# Patient Record
Sex: FEMALE | Race: WHITE | Employment: OTHER | URBAN - METROPOLITAN AREA
[De-identification: names, ages, dates, MRNs, and addresses within clinical notes are randomized per-mention and may not be internally consistent; named-entity substitution may affect disease eponyms.]

---

## 2017-01-30 ENCOUNTER — TELEPHONE (OUTPATIENT)
Dept: FAMILY MEDICINE CLINIC | Age: 82
End: 2017-01-30

## 2017-02-02 ENCOUNTER — TELEPHONE (OUTPATIENT)
Dept: FAMILY MEDICINE CLINIC | Age: 82
End: 2017-02-02

## 2017-02-05 DIAGNOSIS — N32.81 OVERACTIVE BLADDER: ICD-10-CM

## 2017-02-06 RX ORDER — SOLIFENACIN SUCCINATE 5 MG/1
TABLET, FILM COATED ORAL
Qty: 90 TABLET | Refills: 0 | Status: SHIPPED | OUTPATIENT
Start: 2017-02-06 | End: 2017-05-16 | Stop reason: SDUPTHER

## 2017-05-16 ENCOUNTER — OFFICE VISIT (OUTPATIENT)
Dept: FAMILY MEDICINE CLINIC | Age: 82
End: 2017-05-16

## 2017-05-16 VITALS — DIASTOLIC BLOOD PRESSURE: 80 MMHG | SYSTOLIC BLOOD PRESSURE: 124 MMHG | HEIGHT: 60 IN

## 2017-05-16 DIAGNOSIS — R63.4 WEIGHT LOSS: ICD-10-CM

## 2017-05-16 DIAGNOSIS — N32.81 OVERACTIVE BLADDER: ICD-10-CM

## 2017-05-16 DIAGNOSIS — I69.959 HEMIPLEGIA OF DOMINANT SIDE, LATE EFFECT OF CEREBROVASCULAR DISEASE (HCC): Primary | ICD-10-CM

## 2017-05-16 DIAGNOSIS — I63.20 CEREBROVASCULAR ACCIDENT (CVA) DUE TO OCCLUSION OF PRECEREBRAL ARTERY (HCC): ICD-10-CM

## 2017-05-16 DIAGNOSIS — K21.9 GASTROESOPHAGEAL REFLUX DISEASE WITHOUT ESOPHAGITIS: ICD-10-CM

## 2017-05-16 DIAGNOSIS — E78.2 MIXED HYPERLIPIDEMIA: ICD-10-CM

## 2017-05-16 DIAGNOSIS — I10 ESSENTIAL HYPERTENSION: ICD-10-CM

## 2017-05-16 DIAGNOSIS — I73.9 PERIPHERAL VASCULAR DISEASE (HCC): ICD-10-CM

## 2017-05-16 LAB
A/G RATIO: 1.2 (ref 1.1–2.2)
ALBUMIN SERPL-MCNC: 4.1 G/DL (ref 3.4–5)
ALP BLD-CCNC: 108 U/L (ref 40–129)
ALT SERPL-CCNC: 21 U/L (ref 10–40)
ANION GAP SERPL CALCULATED.3IONS-SCNC: 11 MMOL/L (ref 3–16)
AST SERPL-CCNC: 18 U/L (ref 15–37)
BILIRUB SERPL-MCNC: 0.6 MG/DL (ref 0–1)
BUN BLDV-MCNC: 16 MG/DL (ref 7–20)
CALCIUM SERPL-MCNC: 10.1 MG/DL (ref 8.3–10.6)
CHLORIDE BLD-SCNC: 93 MMOL/L (ref 99–110)
CO2: 29 MMOL/L (ref 21–32)
CREAT SERPL-MCNC: 0.7 MG/DL (ref 0.6–1.2)
GFR AFRICAN AMERICAN: >60
GFR NON-AFRICAN AMERICAN: >60
GLOBULIN: 3.3 G/DL
GLUCOSE BLD-MCNC: 95 MG/DL (ref 70–99)
HCT VFR BLD CALC: 40.9 % (ref 36–48)
HEMOGLOBIN: 13.6 G/DL (ref 12–16)
MCH RBC QN AUTO: 31.2 PG (ref 26–34)
MCHC RBC AUTO-ENTMCNC: 33.2 G/DL (ref 31–36)
MCV RBC AUTO: 94.1 FL (ref 80–100)
PDW BLD-RTO: 14.2 % (ref 12.4–15.4)
PLATELET # BLD: 340 K/UL (ref 135–450)
PMV BLD AUTO: 9.3 FL (ref 5–10.5)
POTASSIUM SERPL-SCNC: 4.6 MMOL/L (ref 3.5–5.1)
RBC # BLD: 4.34 M/UL (ref 4–5.2)
SODIUM BLD-SCNC: 133 MMOL/L (ref 136–145)
TOTAL PROTEIN: 7.4 G/DL (ref 6.4–8.2)
WBC # BLD: 9.4 K/UL (ref 4–11)

## 2017-05-16 PROCEDURE — 36415 COLL VENOUS BLD VENIPUNCTURE: CPT | Performed by: FAMILY MEDICINE

## 2017-05-16 PROCEDURE — 99214 OFFICE O/P EST MOD 30 MIN: CPT | Performed by: FAMILY MEDICINE

## 2017-05-16 RX ORDER — OMEPRAZOLE 20 MG/1
CAPSULE, DELAYED RELEASE ORAL
Qty: 90 CAPSULE | Refills: 1 | Status: SHIPPED | OUTPATIENT
Start: 2017-05-16 | End: 2018-02-12 | Stop reason: SDUPTHER

## 2017-05-16 RX ORDER — VALSARTAN 160 MG/1
TABLET ORAL
Qty: 90 TABLET | Refills: 1 | Status: ON HOLD | OUTPATIENT
Start: 2017-05-16 | End: 2017-10-05 | Stop reason: HOSPADM

## 2017-05-16 RX ORDER — TIZANIDINE 2 MG/1
TABLET ORAL
Qty: 90 TABLET | Refills: 1 | Status: SHIPPED | OUTPATIENT
Start: 2017-05-16 | End: 2017-09-18 | Stop reason: SDUPTHER

## 2017-05-16 RX ORDER — SOLIFENACIN SUCCINATE 5 MG/1
TABLET, FILM COATED ORAL
Qty: 90 TABLET | Refills: 1 | Status: SHIPPED | OUTPATIENT
Start: 2017-05-16 | End: 2017-05-30 | Stop reason: SDUPTHER

## 2017-05-16 RX ORDER — SIMVASTATIN 40 MG
40 TABLET ORAL EVERY EVENING
Qty: 90 TABLET | Refills: 1 | Status: SHIPPED | OUTPATIENT
Start: 2017-05-16 | End: 2018-02-12 | Stop reason: SDUPTHER

## 2017-05-16 RX ORDER — BISOPROLOL FUMARATE AND HYDROCHLOROTHIAZIDE 5; 6.25 MG/1; MG/1
TABLET ORAL
Qty: 90 TABLET | Refills: 1 | Status: ON HOLD | OUTPATIENT
Start: 2017-05-16 | End: 2017-10-05 | Stop reason: HOSPADM

## 2017-05-16 RX ORDER — HYDROCHLOROTHIAZIDE 25 MG/1
TABLET ORAL
Qty: 90 TABLET | Refills: 1 | Status: ON HOLD | OUTPATIENT
Start: 2017-05-16 | End: 2017-10-05 | Stop reason: HOSPADM

## 2017-05-16 RX ORDER — DICLOFENAC SODIUM 75 MG/1
75 TABLET, DELAYED RELEASE ORAL 2 TIMES DAILY
Qty: 60 TABLET | Refills: 3 | Status: ON HOLD | OUTPATIENT
Start: 2017-05-16 | End: 2017-10-05 | Stop reason: HOSPADM

## 2017-05-16 ASSESSMENT — ENCOUNTER SYMPTOMS
DIARRHEA: 0
WHEEZING: 0
BACK PAIN: 0
SORE THROAT: 0
SHORTNESS OF BREATH: 0
EYE PAIN: 0
COUGH: 0
VOMITING: 0
ABDOMINAL PAIN: 0
BLOOD IN STOOL: 0
TROUBLE SWALLOWING: 0

## 2017-05-26 ENCOUNTER — TELEPHONE (OUTPATIENT)
Dept: FAMILY MEDICINE CLINIC | Age: 82
End: 2017-05-26

## 2017-05-30 DIAGNOSIS — N32.81 OVERACTIVE BLADDER: ICD-10-CM

## 2017-05-30 RX ORDER — SOLIFENACIN SUCCINATE 10 MG/1
TABLET, FILM COATED ORAL
Qty: 30 TABLET | Refills: 1 | Status: SHIPPED | OUTPATIENT
Start: 2017-05-30 | End: 2017-11-21

## 2017-08-27 DIAGNOSIS — N32.81 OVERACTIVE BLADDER: ICD-10-CM

## 2017-08-28 RX ORDER — OXYBUTYNIN CHLORIDE 5 MG/1
TABLET ORAL
Qty: 90 TABLET | Refills: 2 | Status: SHIPPED | OUTPATIENT
Start: 2017-08-28 | End: 2017-11-21 | Stop reason: ALTCHOICE

## 2017-09-18 DIAGNOSIS — I63.20 CEREBROVASCULAR ACCIDENT (CVA) DUE TO OCCLUSION OF PRECEREBRAL ARTERY (HCC): ICD-10-CM

## 2017-09-18 RX ORDER — TIZANIDINE 2 MG/1
TABLET ORAL
Qty: 90 TABLET | Refills: 0 | Status: SHIPPED | OUTPATIENT
Start: 2017-09-18 | End: 2017-12-24 | Stop reason: SDUPTHER

## 2017-09-19 ENCOUNTER — OFFICE VISIT (OUTPATIENT)
Dept: FAMILY MEDICINE CLINIC | Age: 82
End: 2017-09-19

## 2017-09-19 VITALS — SYSTOLIC BLOOD PRESSURE: 126 MMHG | HEIGHT: 60 IN | DIASTOLIC BLOOD PRESSURE: 80 MMHG

## 2017-09-19 DIAGNOSIS — I73.9 PERIPHERAL VASCULAR DISEASE (HCC): ICD-10-CM

## 2017-09-19 DIAGNOSIS — Z23 NEEDS FLU SHOT: ICD-10-CM

## 2017-09-19 DIAGNOSIS — E78.2 MIXED HYPERLIPIDEMIA: ICD-10-CM

## 2017-09-19 DIAGNOSIS — I69.959 HEMIPLEGIA, LATE EFFECT OF CEREBROVASCULAR DISEASE (HCC): Primary | ICD-10-CM

## 2017-09-19 DIAGNOSIS — I10 ESSENTIAL HYPERTENSION: ICD-10-CM

## 2017-09-19 LAB
ANION GAP SERPL CALCULATED.3IONS-SCNC: 18 MMOL/L (ref 3–16)
BUN BLDV-MCNC: 34 MG/DL (ref 7–20)
CALCIUM SERPL-MCNC: 9.6 MG/DL (ref 8.3–10.6)
CHLORIDE BLD-SCNC: 91 MMOL/L (ref 99–110)
CO2: 25 MMOL/L (ref 21–32)
CREAT SERPL-MCNC: 1.4 MG/DL (ref 0.6–1.2)
GFR AFRICAN AMERICAN: 43
GFR NON-AFRICAN AMERICAN: 36
GLUCOSE BLD-MCNC: 91 MG/DL (ref 70–99)
HCT VFR BLD CALC: 38.4 % (ref 36–48)
HEMOGLOBIN: 12.8 G/DL (ref 12–16)
MCH RBC QN AUTO: 32 PG (ref 26–34)
MCHC RBC AUTO-ENTMCNC: 33.4 G/DL (ref 31–36)
MCV RBC AUTO: 95.8 FL (ref 80–100)
PDW BLD-RTO: 14.7 % (ref 12.4–15.4)
PLATELET # BLD: 351 K/UL (ref 135–450)
PMV BLD AUTO: 10 FL (ref 5–10.5)
POTASSIUM SERPL-SCNC: 4.2 MMOL/L (ref 3.5–5.1)
RBC # BLD: 4.01 M/UL (ref 4–5.2)
SODIUM BLD-SCNC: 134 MMOL/L (ref 136–145)
WBC # BLD: 11 K/UL (ref 4–11)

## 2017-09-19 PROCEDURE — 90662 IIV NO PRSV INCREASED AG IM: CPT | Performed by: FAMILY MEDICINE

## 2017-09-19 PROCEDURE — G0008 ADMIN INFLUENZA VIRUS VAC: HCPCS | Performed by: FAMILY MEDICINE

## 2017-09-19 PROCEDURE — 99214 OFFICE O/P EST MOD 30 MIN: CPT | Performed by: FAMILY MEDICINE

## 2017-09-19 PROCEDURE — 36415 COLL VENOUS BLD VENIPUNCTURE: CPT | Performed by: FAMILY MEDICINE

## 2017-09-20 ASSESSMENT — ENCOUNTER SYMPTOMS
SHORTNESS OF BREATH: 0
SORE THROAT: 0
ABDOMINAL PAIN: 0
COUGH: 0

## 2017-10-01 PROBLEM — G93.40 ACUTE ENCEPHALOPATHY: Status: ACTIVE | Noted: 2017-10-01

## 2017-10-01 PROBLEM — E87.1 HYPONATREMIA: Status: ACTIVE | Noted: 2017-10-01

## 2017-10-01 PROBLEM — N30.00 ACUTE CYSTITIS: Status: ACTIVE | Noted: 2017-10-01

## 2017-10-19 ENCOUNTER — CARE COORDINATION (OUTPATIENT)
Dept: CASE MANAGEMENT | Age: 82
End: 2017-10-19

## 2017-10-23 ENCOUNTER — CARE COORDINATION (OUTPATIENT)
Dept: CASE MANAGEMENT | Age: 82
End: 2017-10-23

## 2017-10-23 NOTE — CARE COORDINATION
Received an email 10/19/17 regarding a patient update from Ashley Bailey dated for 10/18/17. Ceftin antibiotic completed on 10/16/17. Mirabegron ER 25mg 1 po qd started on 10/17/17, Vesicare 10mg qd d/c'd on 10/16/17. Falls on 10/12&10/13 r/t unassisted attempts to transfer. Bed mobility min assist, transfers mod assist, ambulation SBA 20ft. ADL's grooming min assist, upper/lower body dressing mod assist, toileting min assist, bathing max assist. 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 Insurance cut, estimated date of discharge 10/20/17, family plans to appeal. If unsuccessful home with [facility]. Family support but does not physically assist. Post acute care transition coordinator will continue to follow.  ZHENG BeebeN RN  Care Transition Coordinator  [phone]

## 2017-10-23 NOTE — ADDENDUM NOTE
Encounter addended by: Vincente Phoenix, RN on: 10/23/2017  9:57 AM<BR>    Actions taken: Contacts section saved, Sign clinical note

## 2017-10-30 ENCOUNTER — CARE COORDINATION (OUTPATIENT)
Dept: CASE MANAGEMENT | Age: 82
End: 2017-10-30

## 2017-10-30 ENCOUNTER — TELEPHONE (OUTPATIENT)
Dept: FAMILY MEDICINE CLINIC | Age: 82
End: 2017-10-30

## 2017-10-30 NOTE — CARE COORDINATION
Received a Careport alert patient discharged on 10/29/17 with [facility]. CC's notified. Post acute care signing off.  ZHENG WardN RN  Care Transition Coordinator  [phone]

## 2017-10-30 NOTE — TELEPHONE ENCOUNTER
Juan M Mckay is calling to see if  will sign the home healthcare orders so they can treat the patient at home.

## 2017-10-30 NOTE — CARE COORDINATION
[facility] Transitions Initial Follow Up Call    Call within 2 business days of discharge: Yes    Patient: Rahul Monroy Patient : 11/3/1933   MRN: [id number]  Reason for Admission: There are no discharge diagnoses documented for the most recent discharge. Discharge Date: 10/5/17 RARS: [facility] Risk Score: 12.75     Spoke with: Augustine Mtz (patient)    Facility: [address] [place] [address] Drive - to home      Inpatient Assessment  Care Transitions 24 Hour Call    Care Transitions Interventions         Follow Up: Spoke with Savanna Engle. She states she is \"tired\". [facility] transition to home was [address] Savanna Engle has a weekly aide through  [facility] - she is unsure if this has been restarted. Call placed to Rohini Waddell to confirm - spoke with Karley at [facility] - she states they are not aware that the patient returned home. They can not restart services until requested by patient or her family - informed [facility]  of this info. Savanna Engle uses a w/c at home. Savanna Engle was unsure about [facility] per Ann Marie. Spoke with Aristeo Grace at [facility] # [phone]. They spoke with [facility]  and have her scheduled for a start of care this afternoon. Writer offered assistance in scheduling f/u appt with PCP. Savanna Engle declined. Savanna Engle adamantly declines any further calls from [facility].   Future Appointments  Date Time Provider Alejo Noe   2017 10:30 AM SCHEDULE, VASCULAR LAB 1 [facility]   2017 11:15 AM JIMBO Butcher MD [facility]   2018 11:00 AM DO Denisha Anna  LAURE Vincent RN

## 2017-11-07 ENCOUNTER — PROCEDURE VISIT (OUTPATIENT)
Dept: SURGERY | Age: 82
End: 2017-11-07

## 2017-11-07 ENCOUNTER — OFFICE VISIT (OUTPATIENT)
Dept: SURGERY | Age: 82
End: 2017-11-07

## 2017-11-07 VITALS — DIASTOLIC BLOOD PRESSURE: 80 MMHG | SYSTOLIC BLOOD PRESSURE: 140 MMHG

## 2017-11-07 DIAGNOSIS — I65.23 BILATERAL CAROTID ARTERY STENOSIS: ICD-10-CM

## 2017-11-07 DIAGNOSIS — I63.50 CEREBROVASCULAR ACCIDENT (CVA) DUE TO OCCLUSION OF CEREBRAL ARTERY (HCC): Primary | ICD-10-CM

## 2017-11-07 DIAGNOSIS — E78.2 MIXED HYPERLIPIDEMIA: ICD-10-CM

## 2017-11-07 DIAGNOSIS — I10 ESSENTIAL HYPERTENSION: ICD-10-CM

## 2017-11-07 DIAGNOSIS — I69.959 HEMIPLEGIA, LATE EFFECT OF CEREBROVASCULAR DISEASE (HCC): ICD-10-CM

## 2017-11-07 PROCEDURE — 1123F ACP DISCUSS/DSCN MKR DOCD: CPT | Performed by: SURGERY

## 2017-11-07 PROCEDURE — 93880 EXTRACRANIAL BILAT STUDY: CPT | Performed by: SURGERY

## 2017-11-07 PROCEDURE — 99213 OFFICE O/P EST LOW 20 MIN: CPT | Performed by: SURGERY

## 2017-11-07 PROCEDURE — 1036F TOBACCO NON-USER: CPT | Performed by: SURGERY

## 2017-11-07 PROCEDURE — G8484 FLU IMMUNIZE NO ADMIN: HCPCS | Performed by: SURGERY

## 2017-11-07 PROCEDURE — G8420 CALC BMI NORM PARAMETERS: HCPCS | Performed by: SURGERY

## 2017-11-07 PROCEDURE — G8400 PT W/DXA NO RESULTS DOC: HCPCS | Performed by: SURGERY

## 2017-11-07 PROCEDURE — 1090F PRES/ABSN URINE INCON ASSESS: CPT | Performed by: SURGERY

## 2017-11-07 PROCEDURE — G8427 DOCREV CUR MEDS BY ELIG CLIN: HCPCS | Performed by: SURGERY

## 2017-11-07 PROCEDURE — 4040F PNEUMOC VAC/ADMIN/RCVD: CPT | Performed by: SURGERY

## 2017-11-07 PROCEDURE — G8598 ASA/ANTIPLAT THER USED: HCPCS | Performed by: SURGERY

## 2017-11-07 RX ORDER — MIRABEGRON 25 MG/1
TABLET, FILM COATED, EXTENDED RELEASE ORAL
COMMUNITY
Start: 2017-10-27 | End: 2017-11-21 | Stop reason: SDUPTHER

## 2017-11-07 NOTE — PROGRESS NOTES
tone (4/5 reduced muscle strength left lower extremity; 3/5 contracture - left upper extremity). Gait (staggering (no assist devices used); abnormal.   Skin: Skin is warm, dry and intact. No rash noted. No erythema. Psychiatric: She has a normal mood and affect. Her speech is normal   Assessment:      1. Cerebrovascular accident (CVA) due to occlusion of cerebral artery ([address]     2. Hemiplegia, late effect of cerebrovascular disease ([address]     3. Essential hypertension     4. Mixed hyperlipidemia             Plan:      Carotid artery scan shows persistent normal right carotid artery and the left carotid shows ~50% narrowing which is not dangerous.   Same medications as you are taking  Repeat carotid artery scan in 2 years again

## 2017-11-07 NOTE — PATIENT INSTRUCTIONS
Carotid artery scan shows persistent normal right carotid artery and the left carotid shows ~50% narrowing which is not dangerous.   Same medications as you are taking  Repeat carotid artery scan in 2 years again

## 2017-11-20 DIAGNOSIS — I69.959 HEMIPLEGIA OF DOMINANT SIDE, LATE EFFECT OF CEREBROVASCULAR DISEASE (HCC): ICD-10-CM

## 2017-11-21 ENCOUNTER — OFFICE VISIT (OUTPATIENT)
Dept: FAMILY MEDICINE CLINIC | Age: 82
End: 2017-11-21

## 2017-11-21 VITALS — HEIGHT: 61 IN | SYSTOLIC BLOOD PRESSURE: 120 MMHG | DIASTOLIC BLOOD PRESSURE: 70 MMHG

## 2017-11-21 DIAGNOSIS — I63.50 CEREBROVASCULAR ACCIDENT (CVA) DUE TO OCCLUSION OF CEREBRAL ARTERY (HCC): ICD-10-CM

## 2017-11-21 DIAGNOSIS — I10 ESSENTIAL HYPERTENSION: ICD-10-CM

## 2017-11-21 DIAGNOSIS — Z09 HOSPITAL DISCHARGE FOLLOW-UP: Primary | ICD-10-CM

## 2017-11-21 DIAGNOSIS — N32.81 OVERACTIVE BLADDER: ICD-10-CM

## 2017-11-21 DIAGNOSIS — N30.00 ACUTE CYSTITIS WITHOUT HEMATURIA: ICD-10-CM

## 2017-11-21 PROCEDURE — G8420 CALC BMI NORM PARAMETERS: HCPCS | Performed by: FAMILY MEDICINE

## 2017-11-21 PROCEDURE — 4040F PNEUMOC VAC/ADMIN/RCVD: CPT | Performed by: FAMILY MEDICINE

## 2017-11-21 PROCEDURE — 99214 OFFICE O/P EST MOD 30 MIN: CPT | Performed by: FAMILY MEDICINE

## 2017-11-21 PROCEDURE — G8484 FLU IMMUNIZE NO ADMIN: HCPCS | Performed by: FAMILY MEDICINE

## 2017-11-21 PROCEDURE — G8427 DOCREV CUR MEDS BY ELIG CLIN: HCPCS | Performed by: FAMILY MEDICINE

## 2017-11-21 PROCEDURE — 1090F PRES/ABSN URINE INCON ASSESS: CPT | Performed by: FAMILY MEDICINE

## 2017-11-21 PROCEDURE — 1036F TOBACCO NON-USER: CPT | Performed by: FAMILY MEDICINE

## 2017-11-21 PROCEDURE — G8598 ASA/ANTIPLAT THER USED: HCPCS | Performed by: FAMILY MEDICINE

## 2017-11-21 PROCEDURE — 1123F ACP DISCUSS/DSCN MKR DOCD: CPT | Performed by: FAMILY MEDICINE

## 2017-11-21 PROCEDURE — G8400 PT W/DXA NO RESULTS DOC: HCPCS | Performed by: FAMILY MEDICINE

## 2017-11-21 RX ORDER — MIRABEGRON 25 MG/1
25 TABLET, FILM COATED, EXTENDED RELEASE ORAL DAILY
Qty: 30 TABLET | Refills: 3 | Status: SHIPPED | OUTPATIENT
Start: 2017-11-21 | End: 2018-02-12 | Stop reason: SDUPTHER

## 2017-11-21 RX ORDER — DICLOFENAC SODIUM 75 MG/1
TABLET, DELAYED RELEASE ORAL
Qty: 60 TABLET | Refills: 2 | Status: SHIPPED | OUTPATIENT
Start: 2017-11-21 | End: 2017-11-21

## 2017-11-21 NOTE — PROGRESS NOTES
Subjective:      Patient ID: Elyse Phoenix is a 80 y.o. female. [address] discharge follow up  Had prolonged syncopal episide   admitted to hospital 3-4 days   felt syncope due to sepsis e coli utu with metabolic encephalopathy hypotension  Neurology: no new cva   complete resolution  Rehab few weeks at [address]    back to base line    Current Outpatient Prescriptions   Medication Sig      MYRBETRIQ 25 MG TB24       tiZANidine (ZANAFLEX) 2 MG tablet TAKE ONE TABLET BY MOUTH EVERY 8 HOURS AS NEEDED      oxybutynin (DITROPAN) 5 MG tablet TAKE ONE TABLET BY MOUTH THREE TIMES A DAY      simvastatin (ZOCOR) 40 MG tablet Take 1 tablet by mouth every evening      omeprazole (PRILOSEC) 20 MG delayed release capsule Take 1 capsule by mouth  daily      aspirin 81 MG EC tablet Take 162 mg by mouth daily.  diclofenac (VOLTAREN) 75 MG EC tablet TAKE ONE TABLET BY MOUTH TWICE A DAY      solifenacin (VESICARE) 10 MG tablet TAKE ONE TABLET BY MOUTH DAILY       No current facility-administered medications for this visit. Review of Systems   Constitutional: Positive for activity change and appetite change. Negative for chills and fever. HENT: Negative for congestion and sore throat. Eyes: Negative for visual disturbance. Respiratory: Negative for shortness of breath. Cardiovascular: Negative for chest pain. Genitourinary: Positive for difficulty urinating. Musculoskeletal: Positive for gait problem. Neurological: Positive for facial asymmetry, speech difficulty, weakness and numbness. A complete 14 point  review of systems was completed; pertinent positives are noted in HPI    Vitals:    11/21/17 1142   BP: 120/70   Height: 5' 1\" (1.549 m)     There is no height or weight on file to calculate BMI.      Wt Readings from Last 3 Encounters:   10/03/17 127 lb 6.8 oz (57.8 kg)   12/07/16 150 lb (68 kg)   11/13/16 164 lb 3.9 oz (74.5 kg)     BP Readings from Last 3 Encounters:   11/21/17 120/70   11/07/17 (!) 140/80   10/05/17 (!) 145/87        /70   Ht 5' 1\" (1.549 m)    Objective:   Physical Exam   HENT:   Mouth/Throat: Oropharynx is clear and moist.   Eyes: Conjunctivae are normal.   Neck: Neck supple. Abdominal: Soft. There is no tenderness. Neurological: She is alert. left hemiparalysis  Alert  Irritable         Assessment:      Assessment/Plan:  Leah Cheng was seen today for follow-up from hospital and discuss medications.     Diagnoses and all orders for this visit:    Hospital discharge follow-up    Acute cystitis without hematuria  -     POCT Urinalysis no Micro    Cerebrovascular accident (CVA) due to occlusion of cerebral artery (HCC)    Overactive bladder  -     MYRBETRIQ 25 MG TB24; Take 1 tablet by mouth Daily    Essential hypertension            Plan:      Extensive review hosp and ecf records lab and imaging reports   need to recheck UA      Family wanting to collect sample at home  Cont meds from hosp   med list updated  rto 4 week

## 2017-11-22 ASSESSMENT — PATIENT HEALTH QUESTIONNAIRE - PHQ9
2. FEELING DOWN, DEPRESSED OR HOPELESS: 1
SUM OF ALL RESPONSES TO PHQ QUESTIONS 1-9: 2
1. LITTLE INTEREST OR PLEASURE IN DOING THINGS: 1
SUM OF ALL RESPONSES TO PHQ9 QUESTIONS 1 & 2: 2

## 2017-11-22 ASSESSMENT — ENCOUNTER SYMPTOMS
SORE THROAT: 0
SHORTNESS OF BREATH: 0

## 2017-12-24 DIAGNOSIS — I63.20 CEREBROVASCULAR ACCIDENT (CVA) DUE TO OCCLUSION OF PRECEREBRAL ARTERY (HCC): ICD-10-CM

## 2017-12-26 DIAGNOSIS — I10 ESSENTIAL HYPERTENSION: ICD-10-CM

## 2017-12-26 RX ORDER — VALSARTAN 160 MG/1
TABLET ORAL
Qty: 90 TABLET | Refills: 0 | Status: SHIPPED | OUTPATIENT
Start: 2017-12-26 | End: 2018-07-24 | Stop reason: SDUPTHER

## 2017-12-26 RX ORDER — TIZANIDINE 2 MG/1
TABLET ORAL
Qty: 90 TABLET | Refills: 0 | Status: SHIPPED | OUTPATIENT
Start: 2017-12-26 | End: 2018-02-12 | Stop reason: SDUPTHER

## 2018-02-12 DIAGNOSIS — K21.9 GASTROESOPHAGEAL REFLUX DISEASE WITHOUT ESOPHAGITIS: ICD-10-CM

## 2018-02-12 DIAGNOSIS — I73.9 PERIPHERAL VASCULAR DISEASE (HCC): ICD-10-CM

## 2018-02-12 DIAGNOSIS — I63.20 CEREBROVASCULAR ACCIDENT (CVA) DUE TO OCCLUSION OF PRECEREBRAL ARTERY (HCC): ICD-10-CM

## 2018-02-12 DIAGNOSIS — N32.81 OVERACTIVE BLADDER: ICD-10-CM

## 2018-02-12 DIAGNOSIS — I69.959 HEMIPLEGIA OF DOMINANT SIDE, LATE EFFECT OF CEREBROVASCULAR DISEASE (HCC): ICD-10-CM

## 2018-02-12 DIAGNOSIS — E78.2 MIXED HYPERLIPIDEMIA: ICD-10-CM

## 2018-02-12 RX ORDER — OMEPRAZOLE 20 MG/1
CAPSULE, DELAYED RELEASE ORAL
Qty: 90 CAPSULE | Refills: 0 | Status: SHIPPED | OUTPATIENT
Start: 2018-02-12 | End: 2018-07-24

## 2018-02-12 RX ORDER — TIZANIDINE 2 MG/1
TABLET ORAL
Qty: 90 TABLET | Refills: 0 | Status: SHIPPED | OUTPATIENT
Start: 2018-02-12 | End: 2018-04-08 | Stop reason: SDUPTHER

## 2018-02-12 RX ORDER — MIRABEGRON 25 MG/1
25 TABLET, FILM COATED, EXTENDED RELEASE ORAL DAILY
Qty: 30 TABLET | Refills: 2 | Status: SHIPPED | OUTPATIENT
Start: 2018-02-12 | End: 2018-04-25

## 2018-02-12 RX ORDER — DICLOFENAC SODIUM 75 MG/1
75 TABLET, DELAYED RELEASE ORAL 2 TIMES DAILY
Qty: 60 TABLET | Refills: 2 | Status: SHIPPED | OUTPATIENT
Start: 2018-02-12 | End: 2018-07-16

## 2018-02-12 RX ORDER — SIMVASTATIN 40 MG
40 TABLET ORAL EVERY EVENING
Qty: 90 TABLET | Refills: 0 | Status: SHIPPED | OUTPATIENT
Start: 2018-02-12 | End: 2018-04-02 | Stop reason: SDUPTHER

## 2018-04-02 DIAGNOSIS — E78.2 MIXED HYPERLIPIDEMIA: ICD-10-CM

## 2018-04-02 DIAGNOSIS — I73.9 PERIPHERAL VASCULAR DISEASE (HCC): ICD-10-CM

## 2018-04-02 RX ORDER — SIMVASTATIN 40 MG
40 TABLET ORAL EVERY EVENING
Qty: 90 TABLET | Refills: 1 | Status: SHIPPED | OUTPATIENT
Start: 2018-04-02 | End: 2018-07-24 | Stop reason: SDUPTHER

## 2018-04-08 DIAGNOSIS — I63.20 CEREBROVASCULAR ACCIDENT (CVA) DUE TO OCCLUSION OF PRECEREBRAL ARTERY (HCC): ICD-10-CM

## 2018-04-09 RX ORDER — TIZANIDINE 2 MG/1
TABLET ORAL
Qty: 90 TABLET | Refills: 1 | Status: SHIPPED | OUTPATIENT
Start: 2018-04-09 | End: 2018-04-25

## 2018-04-25 ENCOUNTER — OFFICE VISIT (OUTPATIENT)
Dept: FAMILY MEDICINE CLINIC | Age: 83
End: 2018-04-25

## 2018-04-25 VITALS — HEIGHT: 61 IN | DIASTOLIC BLOOD PRESSURE: 80 MMHG | SYSTOLIC BLOOD PRESSURE: 118 MMHG

## 2018-04-25 DIAGNOSIS — N32.81 OVERACTIVE BLADDER: ICD-10-CM

## 2018-04-25 DIAGNOSIS — D64.9 ANEMIA, UNSPECIFIED TYPE: ICD-10-CM

## 2018-04-25 DIAGNOSIS — I10 ESSENTIAL HYPERTENSION: ICD-10-CM

## 2018-04-25 DIAGNOSIS — I63.50 CEREBROVASCULAR ACCIDENT (CVA) DUE TO OCCLUSION OF CEREBRAL ARTERY (HCC): ICD-10-CM

## 2018-04-25 DIAGNOSIS — E78.2 MIXED HYPERLIPIDEMIA: ICD-10-CM

## 2018-04-25 DIAGNOSIS — I69.959 HEMIPLEGIA, LATE EFFECT OF CEREBROVASCULAR DISEASE (HCC): Primary | ICD-10-CM

## 2018-04-25 DIAGNOSIS — I73.9 PERIPHERAL VASCULAR DISEASE (HCC): ICD-10-CM

## 2018-04-25 PROBLEM — N30.00 ACUTE CYSTITIS: Status: RESOLVED | Noted: 2017-10-01 | Resolved: 2018-04-25

## 2018-04-25 LAB
ANION GAP SERPL CALCULATED.3IONS-SCNC: 12 MMOL/L (ref 3–16)
BUN BLDV-MCNC: 21 MG/DL (ref 7–20)
CALCIUM SERPL-MCNC: 9.3 MG/DL (ref 8.3–10.6)
CHLORIDE BLD-SCNC: 95 MMOL/L (ref 99–110)
CO2: 28 MMOL/L (ref 21–32)
CREAT SERPL-MCNC: 0.7 MG/DL (ref 0.6–1.2)
FERRITIN: 478 NG/ML (ref 15–150)
GFR AFRICAN AMERICAN: >60
GFR NON-AFRICAN AMERICAN: >60
GLUCOSE BLD-MCNC: 106 MG/DL (ref 70–99)
HCT VFR BLD CALC: 40.1 % (ref 36–48)
HEMOGLOBIN: 13.3 G/DL (ref 12–16)
MCH RBC QN AUTO: 31.3 PG (ref 26–34)
MCHC RBC AUTO-ENTMCNC: 33.3 G/DL (ref 31–36)
MCV RBC AUTO: 94 FL (ref 80–100)
PDW BLD-RTO: 14.9 % (ref 12.4–15.4)
PLATELET # BLD: 334 K/UL (ref 135–450)
PMV BLD AUTO: 9.7 FL (ref 5–10.5)
POTASSIUM SERPL-SCNC: 5.1 MMOL/L (ref 3.5–5.1)
RBC # BLD: 4.26 M/UL (ref 4–5.2)
SODIUM BLD-SCNC: 135 MMOL/L (ref 136–145)
WBC # BLD: 8.7 K/UL (ref 4–11)

## 2018-04-25 PROCEDURE — 99214 OFFICE O/P EST MOD 30 MIN: CPT | Performed by: FAMILY MEDICINE

## 2018-04-25 PROCEDURE — 1036F TOBACCO NON-USER: CPT | Performed by: FAMILY MEDICINE

## 2018-04-25 PROCEDURE — 4040F PNEUMOC VAC/ADMIN/RCVD: CPT | Performed by: FAMILY MEDICINE

## 2018-04-25 PROCEDURE — G8427 DOCREV CUR MEDS BY ELIG CLIN: HCPCS | Performed by: FAMILY MEDICINE

## 2018-04-25 PROCEDURE — G8598 ASA/ANTIPLAT THER USED: HCPCS | Performed by: FAMILY MEDICINE

## 2018-04-25 PROCEDURE — 1123F ACP DISCUSS/DSCN MKR DOCD: CPT | Performed by: FAMILY MEDICINE

## 2018-04-25 PROCEDURE — 1090F PRES/ABSN URINE INCON ASSESS: CPT | Performed by: FAMILY MEDICINE

## 2018-04-25 PROCEDURE — G8420 CALC BMI NORM PARAMETERS: HCPCS | Performed by: FAMILY MEDICINE

## 2018-04-25 PROCEDURE — G8400 PT W/DXA NO RESULTS DOC: HCPCS | Performed by: FAMILY MEDICINE

## 2018-04-25 PROCEDURE — 36415 COLL VENOUS BLD VENIPUNCTURE: CPT | Performed by: FAMILY MEDICINE

## 2018-04-25 ASSESSMENT — PATIENT HEALTH QUESTIONNAIRE - PHQ9
SUM OF ALL RESPONSES TO PHQ QUESTIONS 1-9: 2
2. FEELING DOWN, DEPRESSED OR HOPELESS: 1
SUM OF ALL RESPONSES TO PHQ9 QUESTIONS 1 & 2: 2
1. LITTLE INTEREST OR PLEASURE IN DOING THINGS: 1

## 2018-04-26 ASSESSMENT — ENCOUNTER SYMPTOMS
DIARRHEA: 0
ABDOMINAL PAIN: 0
SHORTNESS OF BREATH: 0
WHEEZING: 0
COUGH: 0
VOMITING: 0
BACK PAIN: 0
TROUBLE SWALLOWING: 0
EYE PAIN: 0
BLOOD IN STOOL: 0
SORE THROAT: 0

## 2018-05-06 DIAGNOSIS — K21.9 GASTROESOPHAGEAL REFLUX DISEASE WITHOUT ESOPHAGITIS: ICD-10-CM

## 2018-05-06 RX ORDER — OMEPRAZOLE 20 MG/1
CAPSULE, DELAYED RELEASE ORAL
Qty: 90 CAPSULE | Refills: 0 | Status: SHIPPED | OUTPATIENT
Start: 2018-05-06 | End: 2018-07-24 | Stop reason: SDUPTHER

## 2018-05-18 ENCOUNTER — TELEPHONE (OUTPATIENT)
Dept: FAMILY MEDICINE CLINIC | Age: 83
End: 2018-05-18

## 2018-07-16 DIAGNOSIS — I69.959 HEMIPLEGIA OF DOMINANT SIDE, LATE EFFECT OF CEREBROVASCULAR DISEASE (HCC): ICD-10-CM

## 2018-07-16 RX ORDER — DICLOFENAC SODIUM 75 MG/1
TABLET, DELAYED RELEASE ORAL
Qty: 30 TABLET | Refills: 1 | Status: SHIPPED | OUTPATIENT
Start: 2018-07-16 | End: 2018-07-24 | Stop reason: SDUPTHER

## 2018-07-24 DIAGNOSIS — I10 ESSENTIAL HYPERTENSION: ICD-10-CM

## 2018-07-24 DIAGNOSIS — I69.959 HEMIPLEGIA OF DOMINANT SIDE, LATE EFFECT OF CEREBROVASCULAR DISEASE (HCC): ICD-10-CM

## 2018-07-24 DIAGNOSIS — K21.9 GASTROESOPHAGEAL REFLUX DISEASE WITHOUT ESOPHAGITIS: ICD-10-CM

## 2018-07-24 DIAGNOSIS — I73.9 PERIPHERAL VASCULAR DISEASE (HCC): ICD-10-CM

## 2018-07-24 DIAGNOSIS — E78.2 MIXED HYPERLIPIDEMIA: ICD-10-CM

## 2018-07-24 RX ORDER — DICLOFENAC SODIUM 75 MG/1
TABLET, DELAYED RELEASE ORAL
Qty: 90 TABLET | Refills: 1 | Status: SHIPPED | OUTPATIENT
Start: 2018-07-24 | End: 2018-11-12

## 2018-07-24 RX ORDER — SIMVASTATIN 40 MG
40 TABLET ORAL EVERY EVENING
Qty: 90 TABLET | Refills: 1 | Status: SHIPPED | OUTPATIENT
Start: 2018-07-24 | End: 2019-03-08 | Stop reason: SDUPTHER

## 2018-07-24 RX ORDER — VALSARTAN 160 MG/1
TABLET ORAL
Qty: 90 TABLET | Refills: 1 | Status: SHIPPED | OUTPATIENT
Start: 2018-07-24 | End: 2018-07-30 | Stop reason: RX

## 2018-07-24 RX ORDER — OMEPRAZOLE 20 MG/1
CAPSULE, DELAYED RELEASE ORAL
Qty: 90 CAPSULE | Refills: 1 | Status: SHIPPED | OUTPATIENT
Start: 2018-07-24 | End: 2019-02-24 | Stop reason: SDUPTHER

## 2018-07-25 ENCOUNTER — TELEPHONE (OUTPATIENT)
Dept: FAMILY MEDICINE CLINIC | Age: 83
End: 2018-07-25

## 2018-07-30 ENCOUNTER — TELEPHONE (OUTPATIENT)
Dept: FAMILY MEDICINE CLINIC | Age: 83
End: 2018-07-30

## 2018-07-30 RX ORDER — LOSARTAN POTASSIUM 100 MG/1
100 TABLET ORAL DAILY
Qty: 30 TABLET | Refills: 3 | Status: SHIPPED | OUTPATIENT
Start: 2018-07-30 | End: 2018-12-05 | Stop reason: SDUPTHER

## 2018-09-04 ENCOUNTER — OFFICE VISIT (OUTPATIENT)
Dept: FAMILY MEDICINE CLINIC | Age: 83
End: 2018-09-04

## 2018-09-04 VITALS — SYSTOLIC BLOOD PRESSURE: 132 MMHG | HEIGHT: 61 IN | BODY MASS INDEX: 24.08 KG/M2 | DIASTOLIC BLOOD PRESSURE: 80 MMHG

## 2018-09-04 DIAGNOSIS — I10 ESSENTIAL HYPERTENSION: ICD-10-CM

## 2018-09-04 DIAGNOSIS — E78.2 MIXED HYPERLIPIDEMIA: ICD-10-CM

## 2018-09-04 DIAGNOSIS — I63.50 CEREBROVASCULAR ACCIDENT (CVA) DUE TO OCCLUSION OF CEREBRAL ARTERY (HCC): Primary | ICD-10-CM

## 2018-09-04 DIAGNOSIS — N32.81 OVERACTIVE BLADDER: ICD-10-CM

## 2018-09-04 DIAGNOSIS — Z23 NEED FOR VACCINATION FOR ZOSTER: ICD-10-CM

## 2018-09-04 DIAGNOSIS — I69.352 SPASTIC HEMIPLEGIA OF LEFT DOMINANT SIDE AS LATE EFFECT OF CEREBRAL INFARCTION (HCC): ICD-10-CM

## 2018-09-04 PROCEDURE — 1101F PT FALLS ASSESS-DOCD LE1/YR: CPT | Performed by: FAMILY MEDICINE

## 2018-09-04 PROCEDURE — 99214 OFFICE O/P EST MOD 30 MIN: CPT | Performed by: FAMILY MEDICINE

## 2018-09-04 PROCEDURE — 1036F TOBACCO NON-USER: CPT | Performed by: FAMILY MEDICINE

## 2018-09-04 PROCEDURE — 1090F PRES/ABSN URINE INCON ASSESS: CPT | Performed by: FAMILY MEDICINE

## 2018-09-04 PROCEDURE — G8598 ASA/ANTIPLAT THER USED: HCPCS | Performed by: FAMILY MEDICINE

## 2018-09-04 PROCEDURE — 1123F ACP DISCUSS/DSCN MKR DOCD: CPT | Performed by: FAMILY MEDICINE

## 2018-09-04 PROCEDURE — G8427 DOCREV CUR MEDS BY ELIG CLIN: HCPCS | Performed by: FAMILY MEDICINE

## 2018-09-04 PROCEDURE — G8420 CALC BMI NORM PARAMETERS: HCPCS | Performed by: FAMILY MEDICINE

## 2018-09-04 PROCEDURE — G8400 PT W/DXA NO RESULTS DOC: HCPCS | Performed by: FAMILY MEDICINE

## 2018-09-04 PROCEDURE — 4040F PNEUMOC VAC/ADMIN/RCVD: CPT | Performed by: FAMILY MEDICINE

## 2018-09-04 ASSESSMENT — PATIENT HEALTH QUESTIONNAIRE - PHQ9
SUM OF ALL RESPONSES TO PHQ QUESTIONS 1-9: 2
SUM OF ALL RESPONSES TO PHQ9 QUESTIONS 1 & 2: 2
1. LITTLE INTEREST OR PLEASURE IN DOING THINGS: 1
SUM OF ALL RESPONSES TO PHQ QUESTIONS 1-9: 2
2. FEELING DOWN, DEPRESSED OR HOPELESS: 1

## 2018-09-04 NOTE — PROGRESS NOTES
Subjective:      Patient ID: Guerita Ramsey is a 80 y.o. female. HPI  Check up blood pressure, lipids overactive bladder  Hx cva with hemiplegia arm contracture    No falls    Predominantly confined to wheelchair chair or bed    Limited ability to ambulate     Need assistance and sujpport for all transfers  Cont to be anxious        Review of Systems   Constitutional: Negative for appetite change, fatigue and unexpected weight change. HENT: Negative for congestion. Eyes: Negative for pain and visual disturbance. Respiratory: Negative for cough, shortness of breath and wheezing. Cardiovascular: Negative for chest pain, palpitations and leg swelling. Gastrointestinal: Negative for abdominal pain. Endocrine: Negative for polyuria. Genitourinary: Negative for difficulty urinating. Musculoskeletal: Positive for arthralgias and gait problem. Neurological: Negative for speech difficulty, numbness and headaches. Psychiatric/Behavioral: Positive for dysphoric mood. Negative for confusion and sleep disturbance. The patient is nervous/anxious. A complete 14 point  review of systems was completed; pertinent positives are noted in HPI    Vitals:    09/04/18 1356   BP: 132/80   Height: 5' 1\" (1.549 m)     Body mass index is 24.08 kg/m². Wt Readings from Last 3 Encounters:   10/03/17 127 lb 6.8 oz (57.8 kg)   12/07/16 150 lb (68 kg)   11/13/16 164 lb 3.9 oz (74.5 kg)     BP Readings from Last 3 Encounters:   09/04/18 132/80   04/25/18 118/80   11/21/17 120/70        /80   Ht 5' 1\" (1.549 m)   BMI 24.08 kg/m²    Objective:   Physical Exam   Constitutional: She is oriented to person, place, and time. She appears well-developed. No distress. HENT:   Right Ear: External ear normal.   Left Ear: External ear normal.   Nose: Nose normal.   Mouth/Throat: Oropharynx is clear and moist.   Eyes: Conjunctivae are normal. No scleral icterus. Neck: Neck supple. No thyromegaly present.

## 2018-09-05 ASSESSMENT — ENCOUNTER SYMPTOMS
ABDOMINAL PAIN: 0
WHEEZING: 0
COUGH: 0
EYE PAIN: 0
SHORTNESS OF BREATH: 0

## 2018-10-09 ENCOUNTER — OFFICE VISIT (OUTPATIENT)
Dept: FAMILY MEDICINE CLINIC | Age: 83
End: 2018-10-09
Payer: MEDICARE

## 2018-10-09 VITALS — DIASTOLIC BLOOD PRESSURE: 80 MMHG | BODY MASS INDEX: 24.08 KG/M2 | SYSTOLIC BLOOD PRESSURE: 124 MMHG | HEIGHT: 61 IN

## 2018-10-09 DIAGNOSIS — Z23 NEED FOR INFLUENZA VACCINATION: ICD-10-CM

## 2018-10-09 DIAGNOSIS — I69.352 SPASTIC HEMIPLEGIA OF LEFT DOMINANT SIDE AS LATE EFFECT OF CEREBRAL INFARCTION (HCC): Primary | ICD-10-CM

## 2018-10-09 DIAGNOSIS — I10 ESSENTIAL HYPERTENSION: ICD-10-CM

## 2018-10-09 PROBLEM — G93.40 ACUTE ENCEPHALOPATHY: Status: RESOLVED | Noted: 2017-10-01 | Resolved: 2018-10-09

## 2018-10-09 PROBLEM — E87.1 HYPONATREMIA: Status: RESOLVED | Noted: 2017-10-01 | Resolved: 2018-10-09

## 2018-10-09 PROCEDURE — 99214 OFFICE O/P EST MOD 30 MIN: CPT | Performed by: FAMILY MEDICINE

## 2018-10-09 PROCEDURE — G8598 ASA/ANTIPLAT THER USED: HCPCS | Performed by: FAMILY MEDICINE

## 2018-10-09 PROCEDURE — G8510 SCR DEP NEG, NO PLAN REQD: HCPCS | Performed by: FAMILY MEDICINE

## 2018-10-09 PROCEDURE — G8400 PT W/DXA NO RESULTS DOC: HCPCS | Performed by: FAMILY MEDICINE

## 2018-10-09 PROCEDURE — G8420 CALC BMI NORM PARAMETERS: HCPCS | Performed by: FAMILY MEDICINE

## 2018-10-09 PROCEDURE — G8428 CUR MEDS NOT DOCUMENT: HCPCS | Performed by: FAMILY MEDICINE

## 2018-10-09 PROCEDURE — G0008 ADMIN INFLUENZA VIRUS VAC: HCPCS | Performed by: FAMILY MEDICINE

## 2018-10-09 PROCEDURE — 90662 IIV NO PRSV INCREASED AG IM: CPT | Performed by: FAMILY MEDICINE

## 2018-10-09 PROCEDURE — 1090F PRES/ABSN URINE INCON ASSESS: CPT | Performed by: FAMILY MEDICINE

## 2018-10-09 PROCEDURE — 1036F TOBACCO NON-USER: CPT | Performed by: FAMILY MEDICINE

## 2018-10-09 PROCEDURE — 4040F PNEUMOC VAC/ADMIN/RCVD: CPT | Performed by: FAMILY MEDICINE

## 2018-10-09 PROCEDURE — 1123F ACP DISCUSS/DSCN MKR DOCD: CPT | Performed by: FAMILY MEDICINE

## 2018-10-09 PROCEDURE — 1101F PT FALLS ASSESS-DOCD LE1/YR: CPT | Performed by: FAMILY MEDICINE

## 2018-10-09 PROCEDURE — G8482 FLU IMMUNIZE ORDER/ADMIN: HCPCS | Performed by: FAMILY MEDICINE

## 2018-10-09 ASSESSMENT — PATIENT HEALTH QUESTIONNAIRE - PHQ9
SUM OF ALL RESPONSES TO PHQ QUESTIONS 1-9: 2
1. LITTLE INTEREST OR PLEASURE IN DOING THINGS: 1
SUM OF ALL RESPONSES TO PHQ9 QUESTIONS 1 & 2: 2
2. FEELING DOWN, DEPRESSED OR HOPELESS: 1
SUM OF ALL RESPONSES TO PHQ QUESTIONS 1-9: 2

## 2018-10-09 ASSESSMENT — ENCOUNTER SYMPTOMS
WHEEZING: 0
COUGH: 0
EYE PAIN: 0
ABDOMINAL PAIN: 0
SHORTNESS OF BREATH: 0

## 2018-11-11 DIAGNOSIS — I69.959 HEMIPLEGIA OF DOMINANT SIDE, LATE EFFECT OF CEREBROVASCULAR DISEASE (HCC): ICD-10-CM

## 2018-11-12 RX ORDER — DICLOFENAC SODIUM 75 MG/1
TABLET, DELAYED RELEASE ORAL
Qty: 30 TABLET | Refills: 0 | Status: SHIPPED | OUTPATIENT
Start: 2018-11-12 | End: 2018-12-05 | Stop reason: SDUPTHER

## 2019-01-21 RX ORDER — LOSARTAN POTASSIUM 100 MG/1
TABLET ORAL
Qty: 30 TABLET | Refills: 2 | Status: SHIPPED | OUTPATIENT
Start: 2019-01-21 | End: 2019-02-21 | Stop reason: SDUPTHER

## 2019-01-28 DIAGNOSIS — I69.959 HEMIPLEGIA OF DOMINANT SIDE, LATE EFFECT OF CEREBROVASCULAR DISEASE (HCC): ICD-10-CM

## 2019-01-28 RX ORDER — DICLOFENAC SODIUM 75 MG/1
TABLET, DELAYED RELEASE ORAL
Qty: 60 TABLET | Refills: 0 | Status: SHIPPED | OUTPATIENT
Start: 2019-01-28 | End: 2019-02-24 | Stop reason: SDUPTHER

## 2019-02-21 RX ORDER — LOSARTAN POTASSIUM 100 MG/1
TABLET ORAL
Qty: 30 TABLET | Refills: 2 | Status: SHIPPED | OUTPATIENT
Start: 2019-02-21 | End: 2019-04-15 | Stop reason: SDUPTHER

## 2019-02-24 DIAGNOSIS — I69.959 HEMIPLEGIA OF DOMINANT SIDE, LATE EFFECT OF CEREBROVASCULAR DISEASE (HCC): ICD-10-CM

## 2019-02-24 DIAGNOSIS — K21.9 GASTROESOPHAGEAL REFLUX DISEASE WITHOUT ESOPHAGITIS: ICD-10-CM

## 2019-02-25 RX ORDER — DICLOFENAC SODIUM 75 MG/1
TABLET, DELAYED RELEASE ORAL
Qty: 60 TABLET | Refills: 0 | Status: SHIPPED | OUTPATIENT
Start: 2019-02-25 | End: 2019-04-01 | Stop reason: SDUPTHER

## 2019-02-25 RX ORDER — OMEPRAZOLE 20 MG/1
CAPSULE, DELAYED RELEASE ORAL
Qty: 90 CAPSULE | Refills: 0 | Status: SHIPPED | OUTPATIENT
Start: 2019-02-25 | End: 2019-06-03 | Stop reason: SDUPTHER

## 2019-03-08 DIAGNOSIS — I73.9 PERIPHERAL VASCULAR DISEASE (HCC): ICD-10-CM

## 2019-03-08 DIAGNOSIS — E78.2 MIXED HYPERLIPIDEMIA: ICD-10-CM

## 2019-03-08 RX ORDER — SIMVASTATIN 40 MG
TABLET ORAL
Qty: 90 TABLET | Refills: 0 | Status: SHIPPED | OUTPATIENT
Start: 2019-03-08 | End: 2019-06-09 | Stop reason: SDUPTHER

## 2019-04-01 DIAGNOSIS — I69.959 HEMIPLEGIA OF DOMINANT SIDE, LATE EFFECT OF CEREBROVASCULAR DISEASE (HCC): ICD-10-CM

## 2019-04-01 RX ORDER — DICLOFENAC SODIUM 75 MG/1
TABLET, DELAYED RELEASE ORAL
Qty: 60 TABLET | Refills: 0 | Status: SHIPPED | OUTPATIENT
Start: 2019-04-01 | End: 2019-04-28 | Stop reason: SDUPTHER

## 2019-04-15 ENCOUNTER — OFFICE VISIT (OUTPATIENT)
Dept: FAMILY MEDICINE CLINIC | Age: 84
End: 2019-04-15
Payer: MEDICARE

## 2019-04-15 VITALS — HEIGHT: 61 IN | SYSTOLIC BLOOD PRESSURE: 126 MMHG | BODY MASS INDEX: 24.08 KG/M2 | DIASTOLIC BLOOD PRESSURE: 80 MMHG

## 2019-04-15 DIAGNOSIS — I10 ESSENTIAL HYPERTENSION: ICD-10-CM

## 2019-04-15 DIAGNOSIS — E78.2 MIXED HYPERLIPIDEMIA: ICD-10-CM

## 2019-04-15 DIAGNOSIS — I69.352 SPASTIC HEMIPLEGIA OF LEFT DOMINANT SIDE AS LATE EFFECT OF CEREBRAL INFARCTION (HCC): Primary | ICD-10-CM

## 2019-04-15 DIAGNOSIS — J30.1 SEASONAL ALLERGIC RHINITIS DUE TO POLLEN: ICD-10-CM

## 2019-04-15 LAB
ALT SERPL-CCNC: 35 U/L (ref 10–40)
ANION GAP SERPL CALCULATED.3IONS-SCNC: 15 MMOL/L (ref 3–16)
AST SERPL-CCNC: 27 U/L (ref 15–37)
BUN BLDV-MCNC: 14 MG/DL (ref 7–20)
CALCIUM SERPL-MCNC: 9.6 MG/DL (ref 8.3–10.6)
CHLORIDE BLD-SCNC: 96 MMOL/L (ref 99–110)
CHOLESTEROL, TOTAL: 161 MG/DL (ref 0–199)
CO2: 25 MMOL/L (ref 21–32)
CREAT SERPL-MCNC: 0.6 MG/DL (ref 0.6–1.2)
GFR AFRICAN AMERICAN: >60
GFR NON-AFRICAN AMERICAN: >60
GLUCOSE BLD-MCNC: 104 MG/DL (ref 70–99)
HDLC SERPL-MCNC: 83 MG/DL (ref 40–60)
LDL CHOLESTEROL CALCULATED: 55 MG/DL
POTASSIUM SERPL-SCNC: 4.7 MMOL/L (ref 3.5–5.1)
SODIUM BLD-SCNC: 136 MMOL/L (ref 136–145)
TRIGL SERPL-MCNC: 113 MG/DL (ref 0–150)
VLDLC SERPL CALC-MCNC: 23 MG/DL

## 2019-04-15 PROCEDURE — G8420 CALC BMI NORM PARAMETERS: HCPCS | Performed by: FAMILY MEDICINE

## 2019-04-15 PROCEDURE — 99214 OFFICE O/P EST MOD 30 MIN: CPT | Performed by: FAMILY MEDICINE

## 2019-04-15 PROCEDURE — 36415 COLL VENOUS BLD VENIPUNCTURE: CPT | Performed by: FAMILY MEDICINE

## 2019-04-15 PROCEDURE — G8598 ASA/ANTIPLAT THER USED: HCPCS | Performed by: FAMILY MEDICINE

## 2019-04-15 PROCEDURE — 1036F TOBACCO NON-USER: CPT | Performed by: FAMILY MEDICINE

## 2019-04-15 PROCEDURE — 1123F ACP DISCUSS/DSCN MKR DOCD: CPT | Performed by: FAMILY MEDICINE

## 2019-04-15 PROCEDURE — 96372 THER/PROPH/DIAG INJ SC/IM: CPT | Performed by: FAMILY MEDICINE

## 2019-04-15 PROCEDURE — 1090F PRES/ABSN URINE INCON ASSESS: CPT | Performed by: FAMILY MEDICINE

## 2019-04-15 PROCEDURE — 4040F PNEUMOC VAC/ADMIN/RCVD: CPT | Performed by: FAMILY MEDICINE

## 2019-04-15 PROCEDURE — G8427 DOCREV CUR MEDS BY ELIG CLIN: HCPCS | Performed by: FAMILY MEDICINE

## 2019-04-15 PROCEDURE — G8400 PT W/DXA NO RESULTS DOC: HCPCS | Performed by: FAMILY MEDICINE

## 2019-04-15 RX ORDER — LOSARTAN POTASSIUM 100 MG/1
TABLET ORAL
Qty: 90 TABLET | Refills: 1 | Status: SHIPPED | OUTPATIENT
Start: 2019-04-15 | End: 2019-08-29 | Stop reason: SDUPTHER

## 2019-04-15 RX ORDER — METHYLPREDNISOLONE ACETATE 80 MG/ML
80 INJECTION, SUSPENSION INTRA-ARTICULAR; INTRALESIONAL; INTRAMUSCULAR; SOFT TISSUE ONCE
Status: COMPLETED | OUTPATIENT
Start: 2019-04-15 | End: 2019-04-15

## 2019-04-15 RX ADMIN — METHYLPREDNISOLONE ACETATE 80 MG: 80 INJECTION, SUSPENSION INTRA-ARTICULAR; INTRALESIONAL; INTRAMUSCULAR; SOFT TISSUE at 16:14

## 2019-04-15 ASSESSMENT — ENCOUNTER SYMPTOMS
EYE PAIN: 0
SHORTNESS OF BREATH: 0
WHEEZING: 0
COUGH: 0
ABDOMINAL PAIN: 0

## 2019-04-15 NOTE — PROGRESS NOTES
Subjective:      Patient ID: Mikal Borja is a 80 y.o. female. HPI  Check up,  Blood pressure post cva with left side paralysis   Remains irritable demanding on spouse   denies chest pain or sob   no headache  Left side ess nonfunctinal   requires assitance all activities    Review of Systems   Constitutional: Negative for appetite change, fatigue and unexpected weight change. Eyes: Negative for pain and visual disturbance. Respiratory: Negative for cough, shortness of breath and wheezing. Cardiovascular: Negative for chest pain, palpitations and leg swelling. Gastrointestinal: Negative for abdominal pain. Endocrine: Negative for polyuria. Genitourinary: Negative for difficulty urinating. Neurological: Positive for weakness and numbness. Negative for speech difficulty and headaches. Psychiatric/Behavioral: Positive for agitation. Negative for confusion and sleep disturbance. A complete 14 point  review of systems was completed; pertinent positives are noted in HPI    Vitals:    04/15/19 1509   BP: 126/80   Site: Right Upper Arm   Position: Sitting   Cuff Size: Medium Adult   Height: 5' 1\" (1.549 m)     Body mass index is 24.08 kg/m². Wt Readings from Last 3 Encounters:   10/03/17 127 lb 6.8 oz (57.8 kg)   12/07/16 150 lb (68 kg)   11/13/16 164 lb 3.9 oz (74.5 kg)     BP Readings from Last 3 Encounters:   04/15/19 126/80   10/09/18 124/80   09/04/18 132/80        /80 (Site: Right Upper Arm, Position: Sitting, Cuff Size: Medium Adult)   Ht 5' 1\" (1.549 m)   BMI 24.08 kg/m²     Objective:   Physical Exam   Constitutional: She is oriented to person, place, and time. She appears well-developed. No distress. HENT:   Right Ear: External ear normal.   Left Ear: External ear normal.   Nose: Nose normal.   Mouth/Throat: Oropharynx is clear and moist.   Eyes: Conjunctivae are normal. No scleral icterus. Neck: Neck supple. No thyromegaly present.    Cardiovascular: Normal rate, regular rhythm, normal heart sounds and intact distal pulses. No murmur heard. Pulmonary/Chest: Effort normal and breath sounds normal. No respiratory distress. Abdominal: Soft. Bowel sounds are normal. There is no tenderness. Musculoskeletal: She exhibits no edema. Lymphadenopathy:     She has no cervical adenopathy. Neurological: She is alert and oriented to person, place, and time. A sensory deficit is present. She exhibits abnormal muscle tone. Coordination abnormal.   Left hemiparesis/hemiparalysis   Skin: Skin is warm. No rash noted. Psychiatric: She has a normal mood and affect. Thought content normal.       Assessment:      Assessment/Plan:  Nat Ring was seen today for follow-up and check-up.     Diagnoses and all orders for this visit:    Spastic hemiplegia of left dominant side as late effect of cerebral infarction ([facility] [address] [facility] stable  permanent  -     losartan (COZAAR) 100 MG tablet; TAKE ONE TABLET BY MOUTH DAILY  -     Sedimentation Rate    Essential hypertension, controlled  -     losartan (COZAAR) 100 MG tablet; TAKE ONE TABLET BY MOUTH DAILY  -     Sedimentation Rate  -     Basic Metabolic Panel    Mixed hyperlipidemia, improved  -     AST  -     ALT  -     Sedimentation Rate  -     Lipid Panel    Seasonal allergic rhinitis due to pollen, exaccerbation  -     methylPREDNISolone acetate (DEPO-MEDROL) injection 80 mg            Plan:      Cont current meds  depomedrol for allegires  Lab  rto 4 month  Home exercise        Terrence SIMMONS DO

## 2019-04-16 LAB — SEDIMENTATION RATE, ERYTHROCYTE: 29 MM/HR (ref 0–30)

## 2019-04-17 ASSESSMENT — PATIENT HEALTH QUESTIONNAIRE - PHQ9
SUM OF ALL RESPONSES TO PHQ9 QUESTIONS 1 & 2: 2
2. FEELING DOWN, DEPRESSED OR HOPELESS: 1
SUM OF ALL RESPONSES TO PHQ QUESTIONS 1-9: 2
SUM OF ALL RESPONSES TO PHQ QUESTIONS 1-9: 2
1. LITTLE INTEREST OR PLEASURE IN DOING THINGS: 1

## 2019-04-28 DIAGNOSIS — I69.959 HEMIPLEGIA OF DOMINANT SIDE, LATE EFFECT OF CEREBROVASCULAR DISEASE (HCC): ICD-10-CM

## 2019-04-29 RX ORDER — DICLOFENAC SODIUM 75 MG/1
TABLET, DELAYED RELEASE ORAL
Qty: 60 TABLET | Refills: 0 | Status: SHIPPED | OUTPATIENT
Start: 2019-04-29 | End: 2019-06-03 | Stop reason: SDUPTHER

## 2019-05-09 ENCOUNTER — TELEPHONE (OUTPATIENT)
Dept: FAMILY MEDICINE CLINIC | Age: 84
End: 2019-05-09

## 2019-08-29 ENCOUNTER — OFFICE VISIT (OUTPATIENT)
Dept: FAMILY MEDICINE CLINIC | Age: 84
End: 2019-08-29
Payer: MEDICARE

## 2019-08-29 VITALS — HEIGHT: 61 IN | SYSTOLIC BLOOD PRESSURE: 120 MMHG | BODY MASS INDEX: 24.08 KG/M2 | DIASTOLIC BLOOD PRESSURE: 80 MMHG

## 2019-08-29 DIAGNOSIS — I69.352 SPASTIC HEMIPLEGIA OF LEFT DOMINANT SIDE AS LATE EFFECT OF CEREBRAL INFARCTION (HCC): ICD-10-CM

## 2019-08-29 DIAGNOSIS — I69.959 HEMIPLEGIA OF DOMINANT SIDE, LATE EFFECT OF CEREBROVASCULAR DISEASE (HCC): ICD-10-CM

## 2019-08-29 DIAGNOSIS — I10 ESSENTIAL HYPERTENSION: ICD-10-CM

## 2019-08-29 DIAGNOSIS — E78.2 MIXED HYPERLIPIDEMIA: ICD-10-CM

## 2019-08-29 DIAGNOSIS — K21.9 GASTROESOPHAGEAL REFLUX DISEASE WITHOUT ESOPHAGITIS: ICD-10-CM

## 2019-08-29 DIAGNOSIS — I73.9 PERIPHERAL VASCULAR DISEASE (HCC): ICD-10-CM

## 2019-08-29 PROCEDURE — 99214 OFFICE O/P EST MOD 30 MIN: CPT | Performed by: FAMILY MEDICINE

## 2019-08-29 PROCEDURE — 1090F PRES/ABSN URINE INCON ASSESS: CPT | Performed by: FAMILY MEDICINE

## 2019-08-29 PROCEDURE — G8598 ASA/ANTIPLAT THER USED: HCPCS | Performed by: FAMILY MEDICINE

## 2019-08-29 PROCEDURE — G8420 CALC BMI NORM PARAMETERS: HCPCS | Performed by: FAMILY MEDICINE

## 2019-08-29 PROCEDURE — 1123F ACP DISCUSS/DSCN MKR DOCD: CPT | Performed by: FAMILY MEDICINE

## 2019-08-29 PROCEDURE — G8427 DOCREV CUR MEDS BY ELIG CLIN: HCPCS | Performed by: FAMILY MEDICINE

## 2019-08-29 PROCEDURE — 4040F PNEUMOC VAC/ADMIN/RCVD: CPT | Performed by: FAMILY MEDICINE

## 2019-08-29 PROCEDURE — G8400 PT W/DXA NO RESULTS DOC: HCPCS | Performed by: FAMILY MEDICINE

## 2019-08-29 PROCEDURE — 1036F TOBACCO NON-USER: CPT | Performed by: FAMILY MEDICINE

## 2019-08-29 RX ORDER — OMEPRAZOLE 20 MG/1
CAPSULE, DELAYED RELEASE ORAL
Qty: 180 CAPSULE | Refills: 1 | Status: SHIPPED | OUTPATIENT
Start: 2019-08-29 | End: 2019-10-13 | Stop reason: SDUPTHER

## 2019-08-29 RX ORDER — SIMVASTATIN 40 MG
TABLET ORAL
Qty: 90 TABLET | Refills: 1 | Status: ON HOLD
Start: 2019-08-29 | End: 2020-04-02 | Stop reason: HOSPADM

## 2019-08-29 RX ORDER — DICLOFENAC SODIUM 75 MG/1
TABLET, DELAYED RELEASE ORAL
Qty: 180 TABLET | Refills: 1 | Status: ON HOLD
Start: 2019-08-29 | End: 2020-04-02 | Stop reason: HOSPADM

## 2019-08-29 RX ORDER — LOSARTAN POTASSIUM 100 MG/1
TABLET ORAL
Qty: 90 TABLET | Refills: 1 | Status: SHIPPED | OUTPATIENT
Start: 2019-08-29

## 2019-08-29 ASSESSMENT — PATIENT HEALTH QUESTIONNAIRE - PHQ9
SUM OF ALL RESPONSES TO PHQ QUESTIONS 1-9: 2
1. LITTLE INTEREST OR PLEASURE IN DOING THINGS: 1
2. FEELING DOWN, DEPRESSED OR HOPELESS: 1
SUM OF ALL RESPONSES TO PHQ9 QUESTIONS 1 & 2: 2
SUM OF ALL RESPONSES TO PHQ QUESTIONS 1-9: 2

## 2019-08-29 NOTE — PROGRESS NOTES
Subjective:      Patient ID: Alessandro Dawson is a 80 y.o. female. HPI  Check up blood pressure darcy   left sided spastic hemiplegia from cva  No chest pain or sob   remains somewhat irritable    Current Outpatient Medications   Medication Sig      omeprazole (PRILOSEC) 20 MG delayed release capsule TAKE ONE CAPSULE TWICE A DAY      losartan (COZAAR) 100 MG tablet TAKE ONE TABLET BY MOUTH DAILY      simvastatin (ZOCOR) 40 MG tablet TAKE ONE TABLET BY MOUTH EVERY EVENING      diclofenac (VOLTAREN) 75 MG EC tablet TAKE ONE TABLET BY MOUTH TWICE A DAY      aspirin 81 MG EC tablet Take 162 mg by mouth daily. No current facility-administered medications for this visit. Allergies   Allergen Reactions    Claritin [Loratadine]     Ultram [Tramadol] Other (See Comments)     Hallucinations      nonsmoker    Review of Systems   Constitutional: Negative for appetite change, fatigue and unexpected weight change. Eyes: Negative for pain and visual disturbance. Respiratory: Negative for cough, shortness of breath and wheezing. Cardiovascular: Negative for chest pain, palpitations and leg swelling. Gastrointestinal: Negative for abdominal pain. Endocrine: Negative for polyuria. Genitourinary: Negative for difficulty urinating. Neurological: Positive for weakness. Negative for speech difficulty, numbness and headaches. Psychiatric/Behavioral: Positive for agitation. Negative for confusion and sleep disturbance. The patient is nervous/anxious. A complete 14 point  review of systems was completed; pertinent positives are noted in HPI    Vitals:    08/29/19 1503   BP: 120/80   Height: 5' 1\" (1.549 m)     Body mass index is 24.08 kg/m².      Wt Readings from Last 3 Encounters:   10/03/17 127 lb 6.8 oz (57.8 kg)   12/07/16 150 lb (68 kg)   11/13/16 164 lb 3.9 oz (74.5 kg)     BP Readings from Last 3 Encounters:   08/29/19 120/80   04/15/19 126/80   10/09/18 124/80        /80   Ht 5'

## 2019-09-04 ASSESSMENT — ENCOUNTER SYMPTOMS
COUGH: 0
EYE PAIN: 0
ABDOMINAL PAIN: 0
WHEEZING: 0
SHORTNESS OF BREATH: 0

## 2019-10-13 DIAGNOSIS — K21.9 GASTROESOPHAGEAL REFLUX DISEASE WITHOUT ESOPHAGITIS: ICD-10-CM

## 2019-10-14 RX ORDER — OMEPRAZOLE 20 MG/1
CAPSULE, DELAYED RELEASE ORAL
Qty: 90 CAPSULE | Refills: 0 | Status: SHIPPED | OUTPATIENT
Start: 2019-10-14 | End: 2020-01-26

## 2020-01-13 ENCOUNTER — OFFICE VISIT (OUTPATIENT)
Dept: FAMILY MEDICINE CLINIC | Age: 85
End: 2020-01-13
Payer: MEDICARE

## 2020-01-13 VITALS — SYSTOLIC BLOOD PRESSURE: 126 MMHG | DIASTOLIC BLOOD PRESSURE: 80 MMHG | BODY MASS INDEX: 24.08 KG/M2 | HEIGHT: 61 IN

## 2020-01-13 PROCEDURE — G8482 FLU IMMUNIZE ORDER/ADMIN: HCPCS | Performed by: FAMILY MEDICINE

## 2020-01-13 PROCEDURE — 4040F PNEUMOC VAC/ADMIN/RCVD: CPT | Performed by: FAMILY MEDICINE

## 2020-01-13 PROCEDURE — 1036F TOBACCO NON-USER: CPT | Performed by: FAMILY MEDICINE

## 2020-01-13 PROCEDURE — 99214 OFFICE O/P EST MOD 30 MIN: CPT | Performed by: FAMILY MEDICINE

## 2020-01-13 PROCEDURE — 90653 IIV ADJUVANT VACCINE IM: CPT | Performed by: FAMILY MEDICINE

## 2020-01-13 PROCEDURE — G8427 DOCREV CUR MEDS BY ELIG CLIN: HCPCS | Performed by: FAMILY MEDICINE

## 2020-01-13 PROCEDURE — 1123F ACP DISCUSS/DSCN MKR DOCD: CPT | Performed by: FAMILY MEDICINE

## 2020-01-13 PROCEDURE — 1090F PRES/ABSN URINE INCON ASSESS: CPT | Performed by: FAMILY MEDICINE

## 2020-01-13 PROCEDURE — G0008 ADMIN INFLUENZA VIRUS VAC: HCPCS | Performed by: FAMILY MEDICINE

## 2020-01-13 PROCEDURE — G8420 CALC BMI NORM PARAMETERS: HCPCS | Performed by: FAMILY MEDICINE

## 2020-01-13 ASSESSMENT — ENCOUNTER SYMPTOMS
EYE PAIN: 0
WHEEZING: 0
BLOOD IN STOOL: 0
SHORTNESS OF BREATH: 0
VOMITING: 0
SORE THROAT: 0
TROUBLE SWALLOWING: 0
COUGH: 0
DIARRHEA: 0
BACK PAIN: 0
ABDOMINAL PAIN: 0

## 2020-01-13 NOTE — PROGRESS NOTES
Vaccine Information Sheet, \"Influenza - Inactivated\"  given to Laura Nava, or parent/legal guardian of  Laura Nava and verbalized understanding. Patient responses:    Have you ever had a reaction to a flu vaccine? No  Do you have any current illness? No  Have you ever had Guillian Cofield Syndrome? No  Do you have a serious allergy to any of the follow: Neomycin, Polymyxin, Thimerosal, eggs or egg products? No    Flu vaccine given per order. Please see immunization tab. Risks and benefits explained. Current VIS given.       Immunizations Administered     Name Date Dose Route    Influenza, Triv, inactivated, subunit, adjuvanted, IM (Fluad 65 yrs and older) 1/13/2020 0.5 mL Intramuscular    Site: Deltoid- Right    Lot: [id number]    [address]: 46810-949-28
Encounters:   10/03/17 127 lb 6.8 oz (57.8 kg)   12/07/16 150 lb (68 kg)   11/13/16 164 lb 3.9 oz (74.5 kg)     BP Readings from Last 3 Encounters:   01/13/20 126/80   08/29/19 120/80   04/15/19 126/80        /80   Ht 5' 1\" (1.549 m)   BMI 24.08 kg/m²    Objective:   Physical Exam  HENT:      Right Ear: Ear canal and external ear normal.      Left Ear: Ear canal and external ear normal.      Mouth/Throat:      Mouth: Mucous membranes are moist.   Eyes:      Extraocular Movements: Extraocular movements intact. Conjunctiva/sclera: Conjunctivae normal.      Pupils: Pupils are equal, round, and reactive to light. Neck:      Musculoskeletal: Neck supple. Cardiovascular:      Rate and Rhythm: Regular rhythm. Heart sounds: Normal heart sounds. Pulmonary:      Effort: Pulmonary effort is normal.      Breath sounds: Normal breath sounds. No wheezing. Abdominal:      General: Abdomen is flat. Musculoskeletal:      Comments: Left side weakness   contracture     Skin:     General: Skin is warm. Findings: No rash. Neurological:      Mental Status: She is alert. Motor: Weakness present. Coordination: Coordination abnormal.      Gait: Gait abnormal.         Assessment:      Assessment/Plan:  Shereen Stacy was seen today for check-up.     Diagnoses and all orders for this visit:    Spastic hemiplegia of left dominant side as late effect of cerebral infarction [facility])    Peripheral vascular disease ([facility] [address]    Cerebrovascular accident (CVA) due to occlusion of cerebral artery ([facility] [address] [facility]    Essential hypertension    Mixed hyperlipidemia    Needs flu shot  -     INFLUENZA, TRIV, INACTIVATED, SUBUNIT, ADJUVANTED, 65 YRS AND OLDER, IM, PREFILL SYR, 0.5ML (FLUAD TRIV)            Plan:      Cont meds  Flu shot   rto 4 months            [address], DO

## 2020-01-20 ENCOUNTER — OFFICE VISIT (OUTPATIENT)
Dept: SURGERY | Age: 85
End: 2020-01-20
Payer: MEDICARE

## 2020-01-20 ENCOUNTER — PROCEDURE VISIT (OUTPATIENT)
Dept: SURGERY | Age: 85
End: 2020-01-20
Payer: MEDICARE

## 2020-01-20 VITALS — HEART RATE: 76 BPM | DIASTOLIC BLOOD PRESSURE: 86 MMHG | SYSTOLIC BLOOD PRESSURE: 169 MMHG

## 2020-01-20 PROCEDURE — 1036F TOBACCO NON-USER: CPT | Performed by: NURSE PRACTITIONER

## 2020-01-20 PROCEDURE — 1090F PRES/ABSN URINE INCON ASSESS: CPT | Performed by: NURSE PRACTITIONER

## 2020-01-20 PROCEDURE — 99214 OFFICE O/P EST MOD 30 MIN: CPT | Performed by: NURSE PRACTITIONER

## 2020-01-20 PROCEDURE — 4040F PNEUMOC VAC/ADMIN/RCVD: CPT | Performed by: NURSE PRACTITIONER

## 2020-01-20 PROCEDURE — G8420 CALC BMI NORM PARAMETERS: HCPCS | Performed by: NURSE PRACTITIONER

## 2020-01-20 PROCEDURE — G8482 FLU IMMUNIZE ORDER/ADMIN: HCPCS | Performed by: NURSE PRACTITIONER

## 2020-01-20 PROCEDURE — 1123F ACP DISCUSS/DSCN MKR DOCD: CPT | Performed by: NURSE PRACTITIONER

## 2020-01-20 PROCEDURE — G8427 DOCREV CUR MEDS BY ELIG CLIN: HCPCS | Performed by: NURSE PRACTITIONER

## 2020-01-20 NOTE — PATIENT INSTRUCTIONS
Return in about 2 years (around 1/20/2022) for CDS & Office Visit. PATIENT EDUCATION focused on signs/symptoms of stroke:  - Watch for one eye going dark like a shade was pulled down. - Watch for one side of the body or face not functioning correctly. - Difficulty with speech, such as slurring your words. - Difficulty finding the right words, such as calling an object by the wrong name when you know the real name. If you have any of these symptoms, do not call us or your family doctor. Call 911 and go immediately to the hospital.  You have a 4-6 hour window from the point when symptoms start to get to the hospital, get a CT scan done and initiate clot dissolving drugs.

## 2020-01-20 NOTE — LETTER
[facility]  [facility] [address]  [facility] [address]  Phone: [phone]  Fax: [phone]    TEE Nash CNP        January 20, 2020      [address] [place] [address], [facility] [place] [address] [address] [address]     Patient: Bernice Guillen    MR Number: <[id number]>    YOB: 1933    Date of Visit: 1/20/2020        Dear [address] [place] [address]:    Dr. Modesto Shannon March patient, was in the office today following her carotid duplex scan. My assessment is as follows:    Carotid artery stenosis, w/o mention of cerebral infarction  Current plans       * The patient has a 1-15% MAKI stenosis by velocity criteria. * The patient has a 17-71% LICA stenosis by velocity criteria; LICA 93% by             image. * The patient has not experienced any new symptoms related to her carotid disease. * Follow up in 2 years with carotid doppler scan and office visit. I will keep you posted regarding her progress.     Sincerely,        TEE Nash CNP

## 2020-01-20 NOTE — PROGRESS NOTES
2+ on the right side, and 2+ on the left side. Posterior tibial pulses are 2+ on the right side, and 2+ on the left side. Pulmonary/Chest: Effort normal and breath sounds normal. No respiratory distress. She has no wheezes. Abdominal: Soft. Normal appearance and bowel sounds are normal. She exhibits no distension and no mass. There is no tenderness. There is no rigidity, no rebound and no guarding. Incisional scars: 2 C-sections & hysterectomy   Musculoskeletal: Normal range of motion. She exhibits no edema. Neurological: She is alert and oriented to person, place, and time. She exhibits abnormal muscle tone (4/5 reduced muscle strength left lower extremity; 3/5 contracture - left upper extremity). Gait (staggering (no assist devices used); abnormal.   Skin: Skin is warm, dry and intact. No rash noted. No erythema. Psychiatric: She has a normal mood and affect. Her speech is normal   Assessment:       Diagnosis   1. Carotid stenosis, left      * The patient has a 1-15% MAKI stenosis by velocity criteria. * The patient has a 07-33% LICA stenosis by velocity criteria; LICA 33% by             image. * The patient has not experienced any new symptoms related to her carotid disease. * Follow up in 2 years with carotid doppler scan and office visit. 2. History of right-sided carotid endarterectomy    3. Mixed hyperlipidemia - stable, on simvastatin   4. Essential hypertension - BP was high in office; patient believes it is r/t white coat syndrome. A review of her BP flowsheet, shows consistently stable BP. Last week it was 126/80 at her PCP visit. She is on losartan 100 mg daily. 5. Cerebrovascular accident (CVA) due to occlusion of cerebral artery ([address]      PATIENT EDUCATION focused on signs/symptoms of stroke:  - Watch for one eye going dark like a shade was pulled down. - Watch for one side of the body or face not functioning correctly.   - Difficulty with speech, such as slurring your

## 2020-01-21 PROCEDURE — 93880 EXTRACRANIAL BILAT STUDY: CPT | Performed by: SURGERY

## 2020-01-26 RX ORDER — OMEPRAZOLE 20 MG/1
CAPSULE, DELAYED RELEASE ORAL
Qty: 90 CAPSULE | Refills: 0 | Status: SHIPPED | OUTPATIENT
Start: 2020-01-26

## 2020-03-25 ENCOUNTER — TELEPHONE (OUTPATIENT)
Dept: FAMILY MEDICINE CLINIC | Age: 85
End: 2020-03-25

## 2020-03-25 RX ORDER — CIPROFLOXACIN 250 MG/1
250 TABLET, FILM COATED ORAL 2 TIMES DAILY
Qty: 10 TABLET | Refills: 0 | Status: ON HOLD | OUTPATIENT
Start: 2020-03-25 | End: 2020-04-02 | Stop reason: HOSPADM

## 2020-03-26 ENCOUNTER — APPOINTMENT (OUTPATIENT)
Dept: CT IMAGING | Age: 85
DRG: 064 | End: 2020-03-26
Payer: MEDICARE

## 2020-03-26 ENCOUNTER — APPOINTMENT (OUTPATIENT)
Dept: GENERAL RADIOLOGY | Age: 85
DRG: 064 | End: 2020-03-26
Payer: MEDICARE

## 2020-03-26 ENCOUNTER — HOSPITAL ENCOUNTER (INPATIENT)
Age: 85
LOS: 7 days | Discharge: NURSING, REHAB OR LONG-TERM CARE | DRG: 064 | End: 2020-04-02
Attending: EMERGENCY MEDICINE | Admitting: INTERNAL MEDICINE
Payer: MEDICARE

## 2020-03-26 PROBLEM — R41.82 AMS (ALTERED MENTAL STATUS): Status: ACTIVE | Noted: 2020-03-26

## 2020-03-26 LAB
A/G RATIO: 1.1 (ref 1.1–2.2)
ALBUMIN SERPL-MCNC: 3.9 G/DL (ref 3.4–5)
ALP BLD-CCNC: 102 U/L (ref 40–129)
ALT SERPL-CCNC: 33 U/L (ref 10–40)
AMPHETAMINE SCREEN, URINE: NORMAL
ANION GAP SERPL CALCULATED.3IONS-SCNC: 15 MMOL/L (ref 3–16)
AST SERPL-CCNC: 29 U/L (ref 15–37)
BARBITURATE SCREEN URINE: NORMAL
BASOPHILS ABSOLUTE: 0 K/UL (ref 0–0.2)
BASOPHILS ABSOLUTE: 0 K/UL (ref 0–0.2)
BASOPHILS RELATIVE PERCENT: 0.2 %
BASOPHILS RELATIVE PERCENT: 0.3 %
BENZODIAZEPINE SCREEN, URINE: NORMAL
BILIRUB SERPL-MCNC: 0.7 MG/DL (ref 0–1)
BILIRUBIN URINE: NEGATIVE
BLOOD, URINE: NEGATIVE
BUN BLDV-MCNC: 12 MG/DL (ref 7–20)
CALCIUM SERPL-MCNC: 9.5 MG/DL (ref 8.3–10.6)
CANNABINOID SCREEN URINE: NORMAL
CHLORIDE BLD-SCNC: 94 MMOL/L (ref 99–110)
CLARITY: CLEAR
CO2: 22 MMOL/L (ref 21–32)
COCAINE METABOLITE SCREEN URINE: NORMAL
COLOR: YELLOW
CREAT SERPL-MCNC: 0.6 MG/DL (ref 0.6–1.2)
EOSINOPHILS ABSOLUTE: 0 K/UL (ref 0–0.6)
EOSINOPHILS ABSOLUTE: 0 K/UL (ref 0–0.6)
EOSINOPHILS RELATIVE PERCENT: 0.4 %
EOSINOPHILS RELATIVE PERCENT: 0.4 %
EPITHELIAL CELLS, UA: 2 /HPF (ref 0–5)
GFR AFRICAN AMERICAN: >60
GFR NON-AFRICAN AMERICAN: >60
GLOBULIN: 3.6 G/DL
GLUCOSE BLD-MCNC: 116 MG/DL (ref 70–99)
GLUCOSE URINE: NEGATIVE MG/DL
HCT VFR BLD CALC: 37.4 % (ref 36–48)
HCT VFR BLD CALC: 39.6 % (ref 36–48)
HEMOGLOBIN: 12.5 G/DL (ref 12–16)
HEMOGLOBIN: 13 G/DL (ref 12–16)
HYALINE CASTS: 1 /LPF (ref 0–8)
INR BLD: 1.1 (ref 0.86–1.14)
KETONES, URINE: ABNORMAL MG/DL
LACTIC ACID: 0.7 MMOL/L (ref 0.4–2)
LEUKOCYTE ESTERASE, URINE: ABNORMAL
LIPASE: 16 U/L (ref 13–60)
LYMPHOCYTES ABSOLUTE: 0.9 K/UL (ref 1–5.1)
LYMPHOCYTES ABSOLUTE: 0.9 K/UL (ref 1–5.1)
LYMPHOCYTES RELATIVE PERCENT: 8.3 %
LYMPHOCYTES RELATIVE PERCENT: 9.7 %
Lab: NORMAL
MAGNESIUM: 1.7 MG/DL (ref 1.8–2.4)
MCH RBC QN AUTO: 31.2 PG (ref 26–34)
MCH RBC QN AUTO: 31.9 PG (ref 26–34)
MCHC RBC AUTO-ENTMCNC: 32.9 G/DL (ref 31–36)
MCHC RBC AUTO-ENTMCNC: 33.4 G/DL (ref 31–36)
MCV RBC AUTO: 94.9 FL (ref 80–100)
MCV RBC AUTO: 95.4 FL (ref 80–100)
METHADONE SCREEN, URINE: NORMAL
MICROSCOPIC EXAMINATION: YES
MONOCYTES ABSOLUTE: 1 K/UL (ref 0–1.3)
MONOCYTES ABSOLUTE: 1.1 K/UL (ref 0–1.3)
MONOCYTES RELATIVE PERCENT: 11.3 %
MONOCYTES RELATIVE PERCENT: 9.1 %
NEUTROPHILS ABSOLUTE: 7.5 K/UL (ref 1.7–7.7)
NEUTROPHILS ABSOLUTE: 9.2 K/UL (ref 1.7–7.7)
NEUTROPHILS RELATIVE PERCENT: 78.3 %
NEUTROPHILS RELATIVE PERCENT: 82 %
NITRITE, URINE: NEGATIVE
OPIATE SCREEN URINE: NORMAL
OXYCODONE URINE: NORMAL
PDW BLD-RTO: 14.4 % (ref 12.4–15.4)
PDW BLD-RTO: 14.5 % (ref 12.4–15.4)
PH UA: 7.5
PH UA: 7.5 (ref 5–8)
PHENCYCLIDINE SCREEN URINE: NORMAL
PLATELET # BLD: 255 K/UL (ref 135–450)
PLATELET # BLD: 324 K/UL (ref 135–450)
PMV BLD AUTO: 7.7 FL (ref 5–10.5)
PMV BLD AUTO: 7.9 FL (ref 5–10.5)
POTASSIUM SERPL-SCNC: 4.6 MMOL/L (ref 3.5–5.1)
PROPOXYPHENE SCREEN: NORMAL
PROTEIN UA: ABNORMAL MG/DL
PROTHROMBIN TIME: 12.8 SEC (ref 10–13.2)
RBC # BLD: 3.92 M/UL (ref 4–5.2)
RBC # BLD: 4.17 M/UL (ref 4–5.2)
RBC UA: 1 /HPF (ref 0–4)
SODIUM BLD-SCNC: 131 MMOL/L (ref 136–145)
SPECIFIC GRAVITY UA: 1.01 (ref 1–1.03)
TOTAL PROTEIN: 7.5 G/DL (ref 6.4–8.2)
URINE REFLEX TO CULTURE: YES
URINE TYPE: ABNORMAL
UROBILINOGEN, URINE: 1 E.U./DL
WBC # BLD: 11.3 K/UL (ref 4–11)
WBC # BLD: 9.5 K/UL (ref 4–11)
WBC UA: 3 /HPF (ref 0–5)

## 2020-03-26 PROCEDURE — 6360000002 HC RX W HCPCS: Performed by: PHYSICIAN ASSISTANT

## 2020-03-26 PROCEDURE — 2580000003 HC RX 258: Performed by: INTERNAL MEDICINE

## 2020-03-26 PROCEDURE — 6360000002 HC RX W HCPCS: Performed by: INTERNAL MEDICINE

## 2020-03-26 PROCEDURE — 81001 URINALYSIS AUTO W/SCOPE: CPT

## 2020-03-26 PROCEDURE — 99285 EMERGENCY DEPT VISIT HI MDM: CPT

## 2020-03-26 PROCEDURE — 83605 ASSAY OF LACTIC ACID: CPT

## 2020-03-26 PROCEDURE — 80307 DRUG TEST PRSMV CHEM ANLYZR: CPT

## 2020-03-26 PROCEDURE — 87040 BLOOD CULTURE FOR BACTERIA: CPT

## 2020-03-26 PROCEDURE — 72125 CT NECK SPINE W/O DYE: CPT

## 2020-03-26 PROCEDURE — 80053 COMPREHEN METABOLIC PANEL: CPT

## 2020-03-26 PROCEDURE — 6360000002 HC RX W HCPCS

## 2020-03-26 PROCEDURE — 93005 ELECTROCARDIOGRAM TRACING: CPT | Performed by: PHYSICIAN ASSISTANT

## 2020-03-26 PROCEDURE — 2060000000 HC ICU INTERMEDIATE R&B

## 2020-03-26 PROCEDURE — 2580000003 HC RX 258: Performed by: PHYSICIAN ASSISTANT

## 2020-03-26 PROCEDURE — 71045 X-RAY EXAM CHEST 1 VIEW: CPT

## 2020-03-26 PROCEDURE — 71250 CT THORAX DX C-: CPT

## 2020-03-26 PROCEDURE — 83690 ASSAY OF LIPASE: CPT

## 2020-03-26 PROCEDURE — 85610 PROTHROMBIN TIME: CPT

## 2020-03-26 PROCEDURE — 96365 THER/PROPH/DIAG IV INF INIT: CPT

## 2020-03-26 PROCEDURE — 96372 THER/PROPH/DIAG INJ SC/IM: CPT

## 2020-03-26 PROCEDURE — 96375 TX/PRO/DX INJ NEW DRUG ADDON: CPT

## 2020-03-26 PROCEDURE — 87086 URINE CULTURE/COLONY COUNT: CPT

## 2020-03-26 PROCEDURE — 85025 COMPLETE CBC W/AUTO DIFF WBC: CPT

## 2020-03-26 PROCEDURE — 6360000002 HC RX W HCPCS: Performed by: NURSE PRACTITIONER

## 2020-03-26 PROCEDURE — 83735 ASSAY OF MAGNESIUM: CPT

## 2020-03-26 PROCEDURE — 36415 COLL VENOUS BLD VENIPUNCTURE: CPT

## 2020-03-26 PROCEDURE — 70450 CT HEAD/BRAIN W/O DYE: CPT

## 2020-03-26 RX ORDER — ATORVASTATIN CALCIUM 20 MG/1
20 TABLET, FILM COATED ORAL NIGHTLY
Status: DISCONTINUED | OUTPATIENT
Start: 2020-03-26 | End: 2020-03-31

## 2020-03-26 RX ORDER — HALOPERIDOL 5 MG/ML
INJECTION INTRAMUSCULAR
Status: COMPLETED
Start: 2020-03-26 | End: 2020-03-26

## 2020-03-26 RX ORDER — PANTOPRAZOLE SODIUM 40 MG/1
40 TABLET, DELAYED RELEASE ORAL
Status: DISCONTINUED | OUTPATIENT
Start: 2020-03-27 | End: 2020-04-02 | Stop reason: HOSPADM

## 2020-03-26 RX ORDER — HALOPERIDOL 5 MG/ML
2.5 INJECTION INTRAMUSCULAR ONCE
Status: COMPLETED | OUTPATIENT
Start: 2020-03-26 | End: 2020-03-26

## 2020-03-26 RX ORDER — 0.9 % SODIUM CHLORIDE 0.9 %
500 INTRAVENOUS SOLUTION INTRAVENOUS ONCE
Status: COMPLETED | OUTPATIENT
Start: 2020-03-26 | End: 2020-03-26

## 2020-03-26 RX ORDER — ZIPRASIDONE MESYLATE 20 MG/ML
20 INJECTION, POWDER, LYOPHILIZED, FOR SOLUTION INTRAMUSCULAR ONCE
Status: COMPLETED | OUTPATIENT
Start: 2020-03-26 | End: 2020-03-27

## 2020-03-26 RX ORDER — ASPIRIN 81 MG/1
162 TABLET ORAL DAILY
Status: DISCONTINUED | OUTPATIENT
Start: 2020-03-27 | End: 2020-03-31

## 2020-03-26 RX ORDER — PROMETHAZINE HYDROCHLORIDE 25 MG/1
12.5 TABLET ORAL EVERY 6 HOURS PRN
Status: DISCONTINUED | OUTPATIENT
Start: 2020-03-26 | End: 2020-04-02 | Stop reason: HOSPADM

## 2020-03-26 RX ORDER — ACETAMINOPHEN 650 MG/1
650 SUPPOSITORY RECTAL EVERY 6 HOURS PRN
Status: DISCONTINUED | OUTPATIENT
Start: 2020-03-26 | End: 2020-04-02 | Stop reason: HOSPADM

## 2020-03-26 RX ORDER — LORAZEPAM 2 MG/ML
1 INJECTION INTRAMUSCULAR ONCE
Status: COMPLETED | OUTPATIENT
Start: 2020-03-26 | End: 2020-03-26

## 2020-03-26 RX ORDER — HALOPERIDOL 5 MG/ML
0.5 INJECTION INTRAMUSCULAR EVERY 30 MIN PRN
Status: DISPENSED | OUTPATIENT
Start: 2020-03-26 | End: 2020-03-27

## 2020-03-26 RX ORDER — ACETAMINOPHEN 325 MG/1
650 TABLET ORAL EVERY 6 HOURS PRN
Status: DISCONTINUED | OUTPATIENT
Start: 2020-03-26 | End: 2020-04-02 | Stop reason: HOSPADM

## 2020-03-26 RX ORDER — ONDANSETRON 2 MG/ML
4 INJECTION INTRAMUSCULAR; INTRAVENOUS EVERY 6 HOURS PRN
Status: DISCONTINUED | OUTPATIENT
Start: 2020-03-26 | End: 2020-04-02 | Stop reason: HOSPADM

## 2020-03-26 RX ORDER — SODIUM CHLORIDE 0.9 % (FLUSH) 0.9 %
10 SYRINGE (ML) INJECTION PRN
Status: DISCONTINUED | OUTPATIENT
Start: 2020-03-26 | End: 2020-04-02 | Stop reason: HOSPADM

## 2020-03-26 RX ORDER — LOSARTAN POTASSIUM 100 MG/1
100 TABLET ORAL DAILY
Status: DISCONTINUED | OUTPATIENT
Start: 2020-03-27 | End: 2020-04-02 | Stop reason: HOSPADM

## 2020-03-26 RX ORDER — POLYETHYLENE GLYCOL 3350 17 G/17G
17 POWDER, FOR SOLUTION ORAL DAILY PRN
Status: DISCONTINUED | OUTPATIENT
Start: 2020-03-26 | End: 2020-04-02 | Stop reason: HOSPADM

## 2020-03-26 RX ORDER — SODIUM CHLORIDE 0.9 % (FLUSH) 0.9 %
10 SYRINGE (ML) INJECTION EVERY 12 HOURS SCHEDULED
Status: DISCONTINUED | OUTPATIENT
Start: 2020-03-26 | End: 2020-04-02 | Stop reason: HOSPADM

## 2020-03-26 RX ORDER — HALOPERIDOL 5 MG/ML
5 INJECTION INTRAMUSCULAR ONCE
Status: COMPLETED | OUTPATIENT
Start: 2020-03-26 | End: 2020-03-26

## 2020-03-26 RX ORDER — MAGNESIUM SULFATE 1 G/100ML
1 INJECTION INTRAVENOUS ONCE
Status: COMPLETED | OUTPATIENT
Start: 2020-03-26 | End: 2020-03-26

## 2020-03-26 RX ADMIN — HALOPERIDOL LACTATE 5 MG: 5 INJECTION, SOLUTION INTRAMUSCULAR at 12:39

## 2020-03-26 RX ADMIN — HALOPERIDOL LACTATE 5 MG: 5 INJECTION INTRAMUSCULAR at 01:20

## 2020-03-26 RX ADMIN — SODIUM CHLORIDE, PRESERVATIVE FREE 10 ML: 5 INJECTION INTRAVENOUS at 23:12

## 2020-03-26 RX ADMIN — LORAZEPAM 1 MG: 2 INJECTION INTRAMUSCULAR; INTRAVENOUS at 14:22

## 2020-03-26 RX ADMIN — SODIUM CHLORIDE 500 ML: 9 INJECTION, SOLUTION INTRAVENOUS at 16:26

## 2020-03-26 RX ADMIN — CEFTRIAXONE 1 G: 1 INJECTION, POWDER, FOR SOLUTION INTRAMUSCULAR; INTRAVENOUS at 23:11

## 2020-03-26 RX ADMIN — HALOPERIDOL 2.5 MG: 5 INJECTION INTRAMUSCULAR at 14:21

## 2020-03-26 RX ADMIN — MAGNESIUM SULFATE HEPTAHYDRATE 1 G: 1 INJECTION, SOLUTION INTRAVENOUS at 16:26

## 2020-03-26 RX ADMIN — HALOPERIDOL LACTATE 2.5 MG: 5 INJECTION, SOLUTION INTRAMUSCULAR at 14:21

## 2020-03-26 RX ADMIN — LORAZEPAM 1 MG: 2 INJECTION INTRAMUSCULAR; INTRAVENOUS at 13:02

## 2020-03-26 ASSESSMENT — PAIN SCALES - WONG BAKER: WONGBAKER_NUMERICALRESPONSE: 4

## 2020-03-26 NOTE — ED NOTES
Pt arrived to the ED, via EMS, pt was found to have Altered mental status this morning, and per EMS, nursing home states that \"pt is alert and orient usually\" pt continues to yell at staff stating\" hurry help me, get me out\" pt is repeating this multiple times.  Pt is scratching and hitting staff     Robi Naranjo RN  03/26/20 8410

## 2020-03-26 NOTE — ED PROVIDER NOTES
history. SOCIAL HISTORY       Social History     Tobacco Use    Smoking status: Never Smoker    Smokeless tobacco: Never Used   Substance Use Topics    Alcohol use: Yes     Alcohol/week: 1.0 standard drinks     Types: 1 Glasses of wine per week     Comment: nightly    Drug use: No       SCREENINGS    Felipe Coma Scale  Eye Opening: Spontaneous  Best Verbal Response: Confused  Best Motor Response: Obeys commands  Felipe Coma Scale Score: 14        PHYSICAL EXAM    (up to 7 for level 4, 8 or more for level 5)     ED Triage Vitals [03/26/20 1214]   BP Temp Temp Source Pulse Resp SpO2 Height Weight   -- 98.7 °F (37.1 °C) Rectal -- -- -- -- --       Physical Exam  Constitutional:       Appearance: Normal appearance. She is normal weight. Comments: History provided by EMS and patient's daughter Catracho Mccann, not present but by phone. HENT:      Head: Normocephalic. Comments: Ecchymotic area noted right supraorbital area     Right Ear: Tympanic membrane, ear canal and external ear normal.      Left Ear: Tympanic membrane, ear canal and external ear normal.      Nose: Nose normal.   Eyes:      Conjunctiva/sclera: Conjunctivae normal.      Pupils: Pupils are equal, round, and reactive to light. Neck:      Musculoskeletal: Normal range of motion and neck supple. No neck rigidity. Cardiovascular:      Rate and Rhythm: Normal rate and regular rhythm. Pulses: Normal pulses. Heart sounds: Normal heart sounds. Pulmonary:      Effort: Pulmonary effort is normal.      Breath sounds: Normal breath sounds. Abdominal:      General: Abdomen is flat. Bowel sounds are normal.      Palpations: Abdomen is soft. Skin:     General: Skin is warm. Findings: Bruising present. Comments: Bruising noted lateral aspect right supraorbital area. Bruising noted left lateral chest area. Neurological:      Comments: History of left hemiparesis following known CVA.   Patient not answering questions appropriately. Clinically confused.          DIAGNOSTIC RESULTS   LABS:    Labs Reviewed   COMPREHENSIVE METABOLIC PANEL - Abnormal; Notable for the following components:       Result Value    Sodium 131 (*)     Chloride 94 (*)     Glucose 116 (*)     All other components within normal limits    Narrative:     Performed at:  [address] [place] [address]   Phone [phone]   URINE RT REFLEX TO CULTURE - Abnormal; Notable for the following components:    Ketones, Urine TRACE (*)     Protein, UA TRACE (*)     Leukocyte Esterase, Urine TRACE (*)     All other components within normal limits    Narrative:     Performed at:  [address] [place] [address]   Phone [phone]   CBC WITH AUTO DIFFERENTIAL - Abnormal; Notable for the following components:    WBC 11.3 (*)     Neutrophils Absolute 9.2 (*)     Lymphocytes Absolute 0.9 (*)     All other components within normal limits    Narrative:     Performed at:  [address] [place] [address]   Phone [phone]   MAGNESIUM - Abnormal; Notable for the following components:    Magnesium 1.70 (*)     All other components within normal limits    Narrative:     Performed at:  [address] [place] [address]   Phone [phone]   CBC - Abnormal; Notable for the following components:    RBC 3.93 (*)     All other components within normal limits    Narrative:     Collection has been rescheduled by OVERS at 03/27/2020 05:54 Reason:   Patient refuse venipuncture  Collection has been rescheduled by [facility] at 03/27/2020 07:19 Reason:   Patient refuse venipuncture per TRUDI Tomlin retime to 8am   Collection has been rescheduled by [facility] at 03/27/2020 07:21 Reason:   [address] INTO ROOM   Performed at:  [facility]  [address] [facility] [place] [address]   Phone [phone]   CULTURE, BLOOD 1    Narrative:     ORDER#: [id number]                          ORDERED BY: Isaiah Traylor  SOURCE: Blood                              COLLECTED:  03/26/20 18:58  ANTIBIOTICS AT HANS.:                      RECEIVED :  03/26/20 19:05  If child <=2 yrs old please draw pediatric bottle. ~Blood Culture #1  Performed at:  [facility]  [address] [facility] [place] [address]   Phone [phone]   CULTURE, BLOOD 2    Narrative:     ORDER#: [id number]                          ORDERED BY: Isaiah Traylor  SOURCE: Blood                              COLLECTED:  03/26/20 18:58  ANTIBIOTICS AT HANS.:                      RECEIVED :  03/26/20 19:04  If child <=2 yrs old please draw pediatric bottle. ~Blood Culture #2  Performed at:  [facility]  [address] [facility] [place] [address]   Phone [phone]   CULTURE, URINE    Narrative:     ORDER#: [id number]                          ORDERED BY: Isaiah Traylor  SOURCE: Urine Clean Catch                  COLLECTED:  03/26/20 17:04  ANTIBIOTICS AT HANS.:                      RECEIVED :  03/26/20 17:35  Performed at:  [facility]  [address] [facility] [address] [place] [address]   Phone [phone]   URINE DRUG SCREEN    Narrative:     Performed at:  [facility]  [address] [facility] [address] [place] [address]   Phone [phone]   LIPASE    Narrative:     Performed at:  [facility]  [address] [facility] [address] [place] [address]   Phone [phone]   LACTIC ACID, PLASMA    Narrative:     Performed at:  [facility]  [address] [facility] [address] [place] [address]   Phone [phone]   PROTIME-INR    Narrative:     Performed at:  [facility]  [address] [place] [address]., [place] [address]   Phone [phone]   MICROSCOPIC URINALYSIS    Narrative:     Performed at:  [facility]  [address] [place] [address] [place] [address]   Phone [phone]   MAGNESIUM    Narrative:     Collection has been rescheduled by OVERS at 03/27/2020 05:54 Reason:   Patient refuse venipuncture  Collection has been rescheduled by [facility] at 03/27/2020 07:19 Reason:   Patient refuse venipuncture per RN James Velez retime to 8am   Collection has been rescheduled by [facility]I at 03/27/2020 07:21 Reason:   [address] INTO ROOM   Performed at:  [address] [place] [address]   Phone [phone]   CBC    Narrative:     Performed at:  [address] [place] [address]   Phone [phone]   MAGNESIUM    Narrative:     Performed at:  [facility]  [address] [place] [address] [place] [address]   Phone [phone]   MAGNESIUM    Narrative:     Performed at:  [facility]  [address] [place] [address] [place] [address]   Phone [phone]       All other labs were within normal range or not returned as of this dictation. EKG: All EKG's are interpreted by the Emergency Department Physician in the absence of a cardiologist.  Please see their note for interpretation of EKG. RADIOLOGY:   Non-plain film images such as CT, Ultrasound and MRI are read by the radiologist. Plain radiographic images are visualized and preliminarily interpreted by the ED Provider with the below findings:        Interpretation per the Radiologist below, if available at the time of this note:    CT CERVICAL SPINE WO CONTRAST   Final Result   No acute abnormality of the cervical spine. CT CHEST WO CONTRAST   Final Result   No acute intrathoracic abnormality.          CT Head WO Contrast   Final Result   No acute intracranial abnormality. Large remote right MCA distribution infarct, similar in appearance. Atrophy and white matter changes are again seen consistent chronic small   vessel ischemic disease. XR CHEST PORTABLE   Final Result   No acute cardiopulmonary disease. No results found. PROCEDURES   Unless otherwise noted below, none     Procedures    CRITICAL CARE TIME   Critical Care  There was a high probability of life-threatening clinical deterioration in the patient's condition requiring my urgent intervention. Total critical care time with the patient was 45 minutes minutes excluding separately reportable procedures. Critical care required due to patients patient's presenting symptoms. Patient confused, combative and aggressive. Nursing staff sends me to room. I rapidly assess patient. She had visible injury on the left supraorbital area and lateral chest area. These are ecchymotic changes that are developing. This is related to fall. To achieve improved control and safety of nursing staff Haldol 5 mg IM given along with Ativan 1 mg IM. Patient has some degree of improvement. Additional Haldol 2.5 mg IV and Ativan 1 mg IV administered. Patient begins to settle. I spent greater than 40 minutes over a period of 5 different phone calls with the patient's daughter Eve Trinidad. Repeat visits to the room ensuring patient safety staff safety.     CONSULTS:  None      EMERGENCY DEPARTMENT COURSE and DIFFERENTIAL DIAGNOSIS/MDM:   Vitals:    Vitals:    03/28/20 2033 03/28/20 2232 03/28/20 2301 03/29/20 0501   BP: (!) 172/89 (!) 157/81 (!) 158/68 (!) 164/78   Pulse: 84 97 96 92   Resp: 17  16 17   Temp: 98 °F (36.7 °C)  98.8 °F (37.1 °C) 97.4 °F (36.3 °C)   TempSrc: Oral  Oral Oral   SpO2: 95%  94% 94%   Weight:       Height:           Patient was given the following medications:  Medications   aspirin EC tablet 162 mg (162 mg Oral Not Given 3/28/20 1153)   losartan (COZAAR) tablet 100

## 2020-03-26 NOTE — ED NOTES
Bed: S-45  Expected date:   Expected time:   Means of arrival: [facility]  Comments:  86F AMS     Melissa Arceo RN  03/26/20 4621

## 2020-03-26 NOTE — PLAN OF CARE
ADVANCED CARE PLANNING    Name:Trupti Choi       :  11/3/1933              MRN:  [id number]      Purpose of Encounter: Advanced care planning in light of admission for AMS. Parties in attendance: :Mirian Aguilar MD, Family members:spoke to daughter Carlos Carson on the phone. Decisional Capacity: not at this time    Diagnosis: Active Problems:    AMS (altered mental status)  Resolved Problems:    * No resolved hospital problems. *    Patients Medical Story: Hx dementia and stroke but doing well at home with family. Change in mental status at home over past few days and concern for UTI. Patient not answering questions, got haldol and ativan in ED for agitation and is drowsy. Daughter states patient is DNR-CCA. Goals of Care Determinations: Patient wishes to focus on getting better, going home  Plan: Will notify Natan Gonzalez DO of change in care plan. Will look at further interventions as needed. Code Status: At this time patient wishes to be DNR-CCA  Time Spent with Patient: > 16 minutes      Electronically signed by Aliyah Urbina MD on 3/26/2020 at 7:21 PM  Thank you Natan Gonzalez DO for the opportunity to be involved in this patient's care.

## 2020-03-26 NOTE — PROGRESS NOTES
Medication Reconciliation    List of medications patient is currently taking is complete. Source of information: 1. Dispense history                                      2. PCP notes     Allergies  Claritin [loratadine] and Ultram [tramadol]     Notes regarding home medications:   1. Unknown if patient received any of her home medications prior to arrival to the emergency department due to altered mental status.

## 2020-03-26 NOTE — ED PROVIDER NOTES
Attending Supervising Physicians Attestation Statement  I was present with the Mid Level Provider during the history and exam. I discussed the findings and plans with the Mid Level Provider and agree as documented in his note     80 yr old presents with AMS. Patient lives with her daughter and son-in-law. Yesterday morning  she woke with altered mental status which has progressed per family. Per family usually from UTI. Treated by PCP supposedly. Patient with AMS on arrival. History limited. Labs and imaging reassuring. Will admit for further inpatient assessment.      Vitals:    03/26/20 1510 03/26/20 1706 03/26/20 1710 03/26/20 1730   BP: (!) 141/67      Pulse: 86 98 100 99   Resp: 18 16     Temp:       TempSrc:       SpO2:  96% 95%        Electronically signed by Orlando Ash MD on 3/26/20 at 6:04 PM EDT            Orlando Ash MD  03/26/20 4730

## 2020-03-27 LAB
ALBUMIN SERPL-MCNC: 3.3 G/DL (ref 3.4–5)
ANION GAP SERPL CALCULATED.3IONS-SCNC: 13 MMOL/L (ref 3–16)
BUN BLDV-MCNC: 8 MG/DL (ref 7–20)
CALCIUM SERPL-MCNC: 8.8 MG/DL (ref 8.3–10.6)
CHLORIDE BLD-SCNC: 93 MMOL/L (ref 99–110)
CO2: 22 MMOL/L (ref 21–32)
CREAT SERPL-MCNC: <0.5 MG/DL (ref 0.6–1.2)
EKG ATRIAL RATE: 102 BPM
EKG DIAGNOSIS: NORMAL
EKG P AXIS: 31 DEGREES
EKG P-R INTERVAL: 170 MS
EKG Q-T INTERVAL: 348 MS
EKG QRS DURATION: 66 MS
EKG QTC CALCULATION (BAZETT): 453 MS
EKG R AXIS: -1 DEGREES
EKG T AXIS: 48 DEGREES
EKG VENTRICULAR RATE: 102 BPM
GFR AFRICAN AMERICAN: >60
GFR NON-AFRICAN AMERICAN: >60
GLUCOSE BLD-MCNC: 107 MG/DL (ref 70–99)
HCT VFR BLD CALC: 37.7 % (ref 36–48)
HEMOGLOBIN: 12.7 G/DL (ref 12–16)
MAGNESIUM: 2.1 MG/DL (ref 1.8–2.4)
MCH RBC QN AUTO: 32.3 PG (ref 26–34)
MCHC RBC AUTO-ENTMCNC: 33.6 G/DL (ref 31–36)
MCV RBC AUTO: 96 FL (ref 80–100)
PDW BLD-RTO: 14.5 % (ref 12.4–15.4)
PHOSPHORUS: 2.6 MG/DL (ref 2.5–4.9)
PLATELET # BLD: 280 K/UL (ref 135–450)
PMV BLD AUTO: 8.4 FL (ref 5–10.5)
POTASSIUM SERPL-SCNC: 3.7 MMOL/L (ref 3.5–5.1)
RBC # BLD: 3.93 M/UL (ref 4–5.2)
SODIUM BLD-SCNC: 128 MMOL/L (ref 136–145)
URINE CULTURE, ROUTINE: NORMAL
WBC # BLD: 7.8 K/UL (ref 4–11)

## 2020-03-27 PROCEDURE — 2580000003 HC RX 258: Performed by: INTERNAL MEDICINE

## 2020-03-27 PROCEDURE — 97163 PT EVAL HIGH COMPLEX 45 MIN: CPT

## 2020-03-27 PROCEDURE — 2580000003 HC RX 258: Performed by: NURSE PRACTITIONER

## 2020-03-27 PROCEDURE — 2500000003 HC RX 250 WO HCPCS: Performed by: HOSPITALIST

## 2020-03-27 PROCEDURE — 2060000000 HC ICU INTERMEDIATE R&B

## 2020-03-27 PROCEDURE — 97167 OT EVAL HIGH COMPLEX 60 MIN: CPT

## 2020-03-27 PROCEDURE — 97535 SELF CARE MNGMENT TRAINING: CPT

## 2020-03-27 PROCEDURE — 85027 COMPLETE CBC AUTOMATED: CPT

## 2020-03-27 PROCEDURE — 2580000003 HC RX 258: Performed by: HOSPITALIST

## 2020-03-27 PROCEDURE — 83735 ASSAY OF MAGNESIUM: CPT

## 2020-03-27 PROCEDURE — 80069 RENAL FUNCTION PANEL: CPT

## 2020-03-27 PROCEDURE — 6360000002 HC RX W HCPCS: Performed by: INTERNAL MEDICINE

## 2020-03-27 PROCEDURE — 36415 COLL VENOUS BLD VENIPUNCTURE: CPT

## 2020-03-27 PROCEDURE — 93010 ELECTROCARDIOGRAM REPORT: CPT | Performed by: INTERNAL MEDICINE

## 2020-03-27 PROCEDURE — 6360000002 HC RX W HCPCS: Performed by: NURSE PRACTITIONER

## 2020-03-27 PROCEDURE — 97530 THERAPEUTIC ACTIVITIES: CPT

## 2020-03-27 RX ORDER — SODIUM CHLORIDE 9 MG/ML
INJECTION, SOLUTION INTRAVENOUS CONTINUOUS
Status: DISCONTINUED | OUTPATIENT
Start: 2020-03-27 | End: 2020-03-28

## 2020-03-27 RX ORDER — SODIUM CHLORIDE 0.9 % (FLUSH) 0.9 %
10 SYRINGE (ML) INJECTION PRN
Status: DISCONTINUED | OUTPATIENT
Start: 2020-03-27 | End: 2020-04-02 | Stop reason: HOSPADM

## 2020-03-27 RX ORDER — LIDOCAINE HYDROCHLORIDE 10 MG/ML
5 INJECTION, SOLUTION EPIDURAL; INFILTRATION; INTRACAUDAL; PERINEURAL ONCE
Status: COMPLETED | OUTPATIENT
Start: 2020-03-27 | End: 2020-03-27

## 2020-03-27 RX ORDER — SODIUM CHLORIDE 0.9 % (FLUSH) 0.9 %
10 SYRINGE (ML) INJECTION EVERY 12 HOURS SCHEDULED
Status: DISCONTINUED | OUTPATIENT
Start: 2020-03-27 | End: 2020-04-02 | Stop reason: HOSPADM

## 2020-03-27 RX ADMIN — CEFTRIAXONE 1 G: 1 INJECTION, POWDER, FOR SOLUTION INTRAMUSCULAR; INTRAVENOUS at 22:31

## 2020-03-27 RX ADMIN — ZIPRASIDONE MESYLATE 20 MG: 20 INJECTION, POWDER, LYOPHILIZED, FOR SOLUTION INTRAMUSCULAR at 04:25

## 2020-03-27 RX ADMIN — SODIUM CHLORIDE, PRESERVATIVE FREE 10 ML: 5 INJECTION INTRAVENOUS at 10:31

## 2020-03-27 RX ADMIN — LIDOCAINE HYDROCHLORIDE 5 ML: 10 INJECTION, SOLUTION EPIDURAL; INFILTRATION; INTRACAUDAL; PERINEURAL at 17:38

## 2020-03-27 RX ADMIN — WATER 1.2 ML: 1 INJECTION INTRAMUSCULAR; INTRAVENOUS; SUBCUTANEOUS at 04:26

## 2020-03-27 RX ADMIN — SODIUM CHLORIDE: 9 INJECTION, SOLUTION INTRAVENOUS at 12:27

## 2020-03-27 ASSESSMENT — PAIN SCALES - PAIN ASSESSMENT IN ADVANCED DEMENTIA (PAINAD)
BODYLANGUAGE: 0
BREATHING: 0
BREATHING: 0
TOTALSCORE: 0
BREATHING: 0
BODYLANGUAGE: 0
CONSOLABILITY: 0
TOTALSCORE: 0
BODYLANGUAGE: 0
NEGVOCALIZATION: 0
CONSOLABILITY: 0
BODYLANGUAGE: 0
TOTALSCORE: 0
FACIALEXPRESSION: 0
CONSOLABILITY: 0
FACIALEXPRESSION: 0
CONSOLABILITY: 0
TOTALSCORE: 0
FACIALEXPRESSION: 0
FACIALEXPRESSION: 0
NEGVOCALIZATION: 0
BREATHING: 0
FACIALEXPRESSION: 0
BODYLANGUAGE: 0
BREATHING: 0
TOTALSCORE: 0
NEGVOCALIZATION: 0
CONSOLABILITY: 0

## 2020-03-27 ASSESSMENT — PAIN SCALES - WONG BAKER
WONGBAKER_NUMERICALRESPONSE: 0

## 2020-03-27 NOTE — PROGRESS NOTES
Occupational Therapy   Occupational Therapy Initial Assessment  Date: 3/27/2020   Patient Name: Tiara Romero  MRN: [id number]     : 11/3/1933    Date of Service: 3/27/2020    Assessment: Pt is 80 y.o. F who presents with increased confusion. Pt is being treated for altered mental status. Pt with history of CVA affecting L side in 2017. PTA pt lives with several family members who provide 24/7 supervision/assist. Pt also reports she has caregiver. Pt unable to provide PLOF however info taken from previous hospitalization. Pt clarified she does have assist for showers and dressing, per SW pt does complete toileting on own. Pt transfers via stand pivot to w/c and is able to propel self at baseline. Currently, pt presents with ROM/strength in RUE WFL, LUE with hypertonicity and appears contracted. Pt completed bed mobility with max/total Ax2 and sit <> stand transfers in will stedy with max Ax2. Pt continues to be disoriented and a bit agitated throughout session. Pt required total A for toileting this date with large episode of incontinence. At this time d/t level of assistance required pt is unsafe to return home however pt and family are hopeful that as patient clears and medical status improves she will be safe to d/c home. Will continue to assess pending progress with therapy. Discharge Recommendations:  Continue to assess pending progress, Patient would benefit from continued therapy after discharge, 3-5 sessions per week     Tiraa Romero scored a 10/24 on the AM-PAC ADL Inpatient form. Current research shows that an AM-PAC score of 17 or less is typically not associated with a discharge to the patient's home setting. Based on the patients AM-PAC score and their current ADL deficits, it is recommended that the patient have 3-5 sessions per week of Occupational Therapy at d/c to increase the patients independence.       Assessment   Performance deficits / Impairments: Decreased functional - pt incontinent of urine while on stedy. )  Additional Comments: PTA pt reports aide assists with showers, unclear of assist for dressing - per SW conversation with daughter, daughter reports pt would take w/c into bathroom, complete pivot and toileting without assist. Anticipate pt to require max A for ADL needs at this time. Tone RUE  RUE Tone: Normotonic  Tone LUE  LUE Tone: Hypertonic  Coordination  Movements Are Fluid And Coordinated: No  Coordination and Movement description: Left UE;Fine motor impairments;Gross motor impairments     Bed mobility  Rolling to Left: Dependent/Total  Rolling to Right: Dependent/Total  Supine to Sit: Maximum assistance;2 Person assistance  Sit to Supine: Dependent/Total;2 Person assistance  Scooting: Dependent/Total;2 Person assistance     Transfers  Sit to stand: Maximum assistance;2 Person assistance  Stand to sit: Maximum assistance;2 Person assistance  Transfer Comments: Max Ax2 for sit <> stand from EOB to will stedy and sit back to EOB      Cognition  Overall Cognitive Status: Exceptions  Arousal/Alertness: Delayed responses to stimuli  Following Commands: Follows one step commands with increased time; Follows one step commands with repetition  Attention Span: Attends with cues to redirect  Memory: Decreased recall of recent events;Decreased short term memory  Safety Judgement: Decreased awareness of need for assistance;Decreased awareness of need for safety  Problem Solving: Assistance required to generate solutions;Assistance required to implement solutions  Insights: Decreased awareness of deficits  Initiation: Requires cues for all  Sequencing: Requires cues for all  Cognition Comment: Per daughter pt is typically aware of who she is, where she is and whats going on.                   LUE AROM (degrees)  LUE General AROM: Hypertonicity in LUE - appears contracted in flexion at elbow and wrist   RUE AROM (degrees)  RUE AROM : WFL  Right Hand AROM (degrees)  Right Hand

## 2020-03-27 NOTE — PROGRESS NOTES
Physical Therapy    Facility/Department: [address] MED SURG  Initial Assessment    NAME: Heena Watts  : 11/3/1933  MRN: [id number]    Date of Service: 3/27/2020    Discharge Recommendations:  Patient would benefit from continued therapy after discharge, 3-5 sessions per week     Heena Watts scored a 6/24 on the AM-PAC short mobility form. Current research shows that an AM-PAC score of 17 or less is typically not associated with a discharge to the patient's home setting. Based on the patients AM-PAC score and their current functional mobility deficits, it is recommended that the patient have 3-5 sessions per week of Physical Therapy at d/c to increase the patients independence. If patient discharges prior to next session this note will serve as a discharge summary. Please see below for the latest assessment towards goals. Assessment   Body structures, Functions, Activity limitations: Decreased functional mobility   Assessment: Pt is an 80 y.o. female who presented to the ED on 3/26/20 with AMS s/p fall out of w/c and hitting head. Patient reportedly lives with multiple family members, has  supervision, and propels self in w/c. Today she is functioning well below her baseline. Initial recommendation is for continued therapy at 3-5x/week, but will hopefully improve to return home with prior level of assist as her cognition clears. Treatment Diagnosis: impaired mobility  Prognosis: Guarded  Decision Making: High Complexity  History: see below  Exam: see below  Clinical Presentation: unpredictable  PT Education: PT Role;Plan of Care  Barriers to Learning: cognition  REQUIRES PT FOLLOW UP: Yes  Activity Tolerance  Activity Tolerance: Patient Tolerated treatment well;Patient limited by cognitive status       Patient Diagnosis(es): The primary encounter diagnosis was Altered mental status, unspecified altered mental status type.  Diagnoses of Contusion of face, initial encounter, Contusion of

## 2020-03-27 NOTE — PLAN OF CARE
Problem: Falls - Risk of:  Goal: Will remain free from falls  Description: Will remain free from falls  Outcome: Ongoing  Goal: Absence of physical injury  Description: Absence of physical injury  Outcome: Ongoing  Fall risk assessed. Precautions in place. Bed low and locked with side rails up x3. Nonskid socks on when OOB. Bed/chair alarm utilized. Call light within reach. Frequent checks performed. No falls at this time.       Problem: Confusion - Acute:  Goal: Absence of continued neurological deterioration signs and symptoms  Description: Absence of continued neurological deterioration signs and symptoms  Goal: Mental status will be restored to baseline  Description: Mental status will be restored to baseline  Outcome: Ongoing     Problem: Discharge Planning:  Goal: Ability to perform activities of daily living will improve  Description: Ability to perform activities of daily living will improve  Outcome: Ongoing  Goal: Participates in care planning  Description: Participates in care planning  Outcome: Ongoing     Problem: Injury - Risk of, Physical Injury:  Goal: Will remain free from falls  Description: Will remain free from falls  Outcome: Ongoing  Goal: Absence of physical injury  Description: Absence of physical injury  Outcome: Ongoing     Problem: Mood - Altered:  Goal: Mood stable  Description: Mood stable  Outcome: Ongoing  Goal: Absence of abusive behavior  Description: Absence of abusive behavior  Outcome: Ongoing  Goal: Verbalizations of feeling emotionally comfortable while being cared for will increase  Description: Verbalizations of feeling emotionally comfortable while being cared for will increase  Outcome: Ongoing     Problem: Psychomotor Activity - Altered:  Goal: Absence of psychomotor disturbance signs and symptoms  Description: Absence of psychomotor disturbance signs and symptoms  Outcome: Ongoing     Problem: Sensory Perception - Impaired:  Goal: Demonstrations of improved sensory

## 2020-03-28 LAB
ALBUMIN SERPL-MCNC: 3.5 G/DL (ref 3.4–5)
ANION GAP SERPL CALCULATED.3IONS-SCNC: 13 MMOL/L (ref 3–16)
BUN BLDV-MCNC: 10 MG/DL (ref 7–20)
CALCIUM SERPL-MCNC: 8.8 MG/DL (ref 8.3–10.6)
CHLORIDE BLD-SCNC: 97 MMOL/L (ref 99–110)
CO2: 22 MMOL/L (ref 21–32)
CREAT SERPL-MCNC: 0.6 MG/DL (ref 0.6–1.2)
GFR AFRICAN AMERICAN: >60
GFR NON-AFRICAN AMERICAN: >60
GLUCOSE BLD-MCNC: 82 MG/DL (ref 70–99)
HCT VFR BLD CALC: 39.4 % (ref 36–48)
HEMOGLOBIN: 13.1 G/DL (ref 12–16)
MAGNESIUM: 2 MG/DL (ref 1.8–2.4)
MCH RBC QN AUTO: 31.9 PG (ref 26–34)
MCHC RBC AUTO-ENTMCNC: 33.4 G/DL (ref 31–36)
MCV RBC AUTO: 95.6 FL (ref 80–100)
PDW BLD-RTO: 14.5 % (ref 12.4–15.4)
PHOSPHORUS: 2.5 MG/DL (ref 2.5–4.9)
PLATELET # BLD: 302 K/UL (ref 135–450)
PMV BLD AUTO: 8.7 FL (ref 5–10.5)
POTASSIUM SERPL-SCNC: 3.7 MMOL/L (ref 3.5–5.1)
RBC # BLD: 4.12 M/UL (ref 4–5.2)
SODIUM BLD-SCNC: 132 MMOL/L (ref 136–145)
WBC # BLD: 8.4 K/UL (ref 4–11)

## 2020-03-28 PROCEDURE — 6360000002 HC RX W HCPCS: Performed by: INTERNAL MEDICINE

## 2020-03-28 PROCEDURE — 2580000003 HC RX 258: Performed by: INTERNAL MEDICINE

## 2020-03-28 PROCEDURE — 2580000003 HC RX 258: Performed by: HOSPITALIST

## 2020-03-28 PROCEDURE — 80069 RENAL FUNCTION PANEL: CPT

## 2020-03-28 PROCEDURE — 94760 N-INVAS EAR/PLS OXIMETRY 1: CPT

## 2020-03-28 PROCEDURE — 83735 ASSAY OF MAGNESIUM: CPT

## 2020-03-28 PROCEDURE — 85027 COMPLETE CBC AUTOMATED: CPT

## 2020-03-28 PROCEDURE — 2060000000 HC ICU INTERMEDIATE R&B

## 2020-03-28 PROCEDURE — 36415 COLL VENOUS BLD VENIPUNCTURE: CPT

## 2020-03-28 PROCEDURE — 92610 EVALUATE SWALLOWING FUNCTION: CPT

## 2020-03-28 RX ORDER — POTASSIUM CHLORIDE AND SODIUM CHLORIDE 900; 300 MG/100ML; MG/100ML
INJECTION, SOLUTION INTRAVENOUS CONTINUOUS
Status: DISCONTINUED | OUTPATIENT
Start: 2020-03-28 | End: 2020-03-30

## 2020-03-28 RX ORDER — HYDRALAZINE HYDROCHLORIDE 20 MG/ML
10 INJECTION INTRAMUSCULAR; INTRAVENOUS EVERY 6 HOURS PRN
Status: DISCONTINUED | OUTPATIENT
Start: 2020-03-28 | End: 2020-04-02 | Stop reason: HOSPADM

## 2020-03-28 RX ADMIN — CEFTRIAXONE 1 G: 1 INJECTION, POWDER, FOR SOLUTION INTRAMUSCULAR; INTRAVENOUS at 22:27

## 2020-03-28 RX ADMIN — HYDRALAZINE HYDROCHLORIDE 10 MG: 20 INJECTION INTRAMUSCULAR; INTRAVENOUS at 20:41

## 2020-03-28 RX ADMIN — ENOXAPARIN SODIUM 40 MG: 40 INJECTION SUBCUTANEOUS at 11:52

## 2020-03-28 RX ADMIN — SODIUM CHLORIDE: 9 INJECTION, SOLUTION INTRAVENOUS at 04:57

## 2020-03-28 RX ADMIN — POTASSIUM CHLORIDE AND SODIUM CHLORIDE: 900; 300 INJECTION, SOLUTION INTRAVENOUS at 11:53

## 2020-03-28 NOTE — PROGRESS NOTES
Speech Language Pathology  Facility/Department: [address] MED SURG   CLINICAL BEDSIDE SWALLOW EVALUATION    NAME: Bc Moise  : 11/3/1933  MRN: [id number]    ADMISSION DATE: 3/26/2020  ADMITTING DIAGNOSIS: has Hemiplegia, late effect of cerebrovascular disease ([address] Allergic rhinitis; Irritable bowel syndrome; Peripheral vascular disease ([address] Cerebrovascular accident (CVA) due to occlusion of cerebral artery ([address] Occlusion and stenosis of carotid artery with cerebral infarction; Irritable bladder; Overactive bladder; Essential hypertension; Mixed hyperlipidemia; Compression fracture of L1 lumbar vertebra ([address] Gastroesophageal reflux disease without esophagitis; Renal insufficiency; and AMS (altered mental status) on their problem list.  ONSET DATE: 3/26/2020    Recent Chest Xray/CT of Chest: 3/26/2020      Impression   No acute cardiopulmonary disease. Date of Eval: 3/28/2020  Evaluating Therapist: Vaishnavi Cash MS CCC/SLP [id number]  Speech Language Pathologist    Current Diet level:  General diet  Pt reports no appetite. Primary Complaint  Nsg reports pt given meds by prior shift nsg who reported pt sounding congested in pharynx after PO meds. Pain:  Pain Assessment  Pain Assessment: Advanced Dementia  Samson-Baker Pain Rating: No hurt      Reason for Referral  Bc Moise was referred for a bedside swallow evaluation to assess the efficiency of her swallow function, identify signs and symptoms of aspiration and make recommendations regarding safe dietary consistencies, effective compensatory strategies, and safe eating environment. Vandana Ponce is a 80 y.o. female patient presenting by EMS from home. Patient lives with her daughter and son-in-law. Patient in usual state of health on Tuesday. Yesterday morning, Wednesday she woke with altered mental status which has progressed. Phone call to PCP indicating concern for UTI. Requested UA. UA not obtained.   Cipro

## 2020-03-28 NOTE — PLAN OF CARE
Problem: Falls - Risk of:  Goal: Will remain free from falls  Description: Will remain free from falls  3/28/2020 1044 by Anu Baker RN  Outcome: Ongoing  Note: Pt free from falls this shift. Non skid socks provided to pt. Pt confused. Bed alarm on. Call light always within reach. Reinforced education on use of call light. Problem: Falls - Risk of:  Goal: Absence of physical injury  Description: Absence of physical injury  Outcome: Ongoing     Problem: Confusion - Acute:  Goal: Absence of continued neurological deterioration signs and symptoms  Description: Absence of continued neurological deterioration signs and symptoms  Outcome: Ongoing  Note: Pt alert to person and place this morning. Pt able to follow commands. Problem: Confusion - Acute:  Goal: Mental status will be restored to baseline  Description: Mental status will be restored to baseline  3/28/2020 1044 by Anu Baker RN  Outcome: Ongoing     Problem: Discharge Planning:  Goal: Ability to perform activities of daily living will improve  Description: Ability to perform activities of daily living will improve  Outcome: Ongoing     Problem: Injury - Risk of, Physical Injury:  Goal: Will remain free from falls  Description: Will remain free from falls  3/28/2020 1044 by Anu Baker RN  Outcome: Ongoing  Note: Pt free from falls this shift. Non skid socks provided to pt. Pt confused. Bed alarm on. Call light always within reach. Reinforced education on use of call light.       Problem: Sensory Perception - Impaired:  Goal: Demonstrations of improved sensory functioning will increase  Description: Demonstrations of improved sensory functioning will increase  Outcome: Ongoing     Problem: Sensory Perception - Impaired:  Goal: Decrease in sensory misperception frequency  Description: Decrease in sensory misperception frequency  Outcome: Ongoing     Problem: Sensory Perception - Impaired:  Goal: Able to refrain from responding to

## 2020-03-28 NOTE — PROGRESS NOTES
Pt awake in bed, alert to person and place only. VSS. Pt denies any pain or discomfort. Pt kept NPO until speech come and performs swallow eval. Pt lungs sounds are diminished and unlabored on room air. BS present in all quadrants. IVF-infusing as ordered. p repositioned to right side with pillow support. Pt resting in bed. Bed alarm on. Call light and item need in reach. Electronically signed by Sal Amezcua RN on 3/28/2020 at 10:44 AM

## 2020-03-29 LAB
ALBUMIN SERPL-MCNC: 3.5 G/DL (ref 3.4–5)
ANION GAP SERPL CALCULATED.3IONS-SCNC: 15 MMOL/L (ref 3–16)
BUN BLDV-MCNC: 10 MG/DL (ref 7–20)
CALCIUM SERPL-MCNC: 8.8 MG/DL (ref 8.3–10.6)
CHLORIDE BLD-SCNC: 98 MMOL/L (ref 99–110)
CO2: 19 MMOL/L (ref 21–32)
CREAT SERPL-MCNC: 0.5 MG/DL (ref 0.6–1.2)
GFR AFRICAN AMERICAN: >60
GFR NON-AFRICAN AMERICAN: >60
GLUCOSE BLD-MCNC: 108 MG/DL (ref 70–99)
GLUCOSE BLD-MCNC: 87 MG/DL (ref 70–99)
GLUCOSE BLD-MCNC: 87 MG/DL (ref 70–99)
GLUCOSE BLD-MCNC: 91 MG/DL (ref 70–99)
HCT VFR BLD CALC: 37 % (ref 36–48)
HEMOGLOBIN: 12.4 G/DL (ref 12–16)
MAGNESIUM: 1.9 MG/DL (ref 1.8–2.4)
MCH RBC QN AUTO: 32 PG (ref 26–34)
MCHC RBC AUTO-ENTMCNC: 33.6 G/DL (ref 31–36)
MCV RBC AUTO: 95.3 FL (ref 80–100)
PDW BLD-RTO: 14.7 % (ref 12.4–15.4)
PERFORMED ON: ABNORMAL
PERFORMED ON: NORMAL
PERFORMED ON: NORMAL
PHOSPHORUS: 2 MG/DL (ref 2.5–4.9)
PLATELET # BLD: 328 K/UL (ref 135–450)
PMV BLD AUTO: 8.5 FL (ref 5–10.5)
POTASSIUM SERPL-SCNC: 4.4 MMOL/L (ref 3.5–5.1)
RBC # BLD: 3.88 M/UL (ref 4–5.2)
SODIUM BLD-SCNC: 132 MMOL/L (ref 136–145)
WBC # BLD: 10 K/UL (ref 4–11)

## 2020-03-29 PROCEDURE — 85027 COMPLETE CBC AUTOMATED: CPT

## 2020-03-29 PROCEDURE — 6360000002 HC RX W HCPCS: Performed by: INTERNAL MEDICINE

## 2020-03-29 PROCEDURE — 94760 N-INVAS EAR/PLS OXIMETRY 1: CPT

## 2020-03-29 PROCEDURE — 83735 ASSAY OF MAGNESIUM: CPT

## 2020-03-29 PROCEDURE — 2060000000 HC ICU INTERMEDIATE R&B

## 2020-03-29 PROCEDURE — 36415 COLL VENOUS BLD VENIPUNCTURE: CPT

## 2020-03-29 PROCEDURE — 80069 RENAL FUNCTION PANEL: CPT

## 2020-03-29 RX ADMIN — POTASSIUM CHLORIDE AND SODIUM CHLORIDE: 900; 300 INJECTION, SOLUTION INTRAVENOUS at 02:24

## 2020-03-29 RX ADMIN — ENOXAPARIN SODIUM 40 MG: 40 INJECTION SUBCUTANEOUS at 09:17

## 2020-03-29 RX ADMIN — POTASSIUM CHLORIDE AND SODIUM CHLORIDE: 900; 300 INJECTION, SOLUTION INTRAVENOUS at 18:26

## 2020-03-29 RX ADMIN — HYDRALAZINE HYDROCHLORIDE 10 MG: 20 INJECTION INTRAMUSCULAR; INTRAVENOUS at 09:17

## 2020-03-29 ASSESSMENT — PAIN SCALES - PAIN ASSESSMENT IN ADVANCED DEMENTIA (PAINAD)
FACIALEXPRESSION: 0
TOTALSCORE: 0
NEGVOCALIZATION: 0
CONSOLABILITY: 0
BREATHING: 0
TOTALSCORE: 0
FACIALEXPRESSION: 0
CONSOLABILITY: 0
FACIALEXPRESSION: 0
NEGVOCALIZATION: 0
TOTALSCORE: 0
FACIALEXPRESSION: 0
BODYLANGUAGE: 0
NEGVOCALIZATION: 0
BREATHING: 0
FACIALEXPRESSION: 0
BREATHING: 0
TOTALSCORE: 0
CONSOLABILITY: 0
BODYLANGUAGE: 0
BREATHING: 0
CONSOLABILITY: 0
TOTALSCORE: 0
CONSOLABILITY: 0
CONSOLABILITY: 0
BODYLANGUAGE: 0
CONSOLABILITY: 0
TOTALSCORE: 0
TOTALSCORE: 0
FACIALEXPRESSION: 0
NEGVOCALIZATION: 0
BODYLANGUAGE: 0
BODYLANGUAGE: 0
TOTALSCORE: 0
NEGVOCALIZATION: 0
NEGVOCALIZATION: 0
BREATHING: 0
BODYLANGUAGE: 0
BODYLANGUAGE: 0
NEGVOCALIZATION: 0
BREATHING: 0
BREATHING: 0
FACIALEXPRESSION: 0
CONSOLABILITY: 0
FACIALEXPRESSION: 0
NEGVOCALIZATION: 0
BREATHING: 0
BODYLANGUAGE: 0

## 2020-03-29 ASSESSMENT — PAIN SCALES - WONG BAKER
WONGBAKER_NUMERICALRESPONSE: 0

## 2020-03-29 NOTE — PROGRESS NOTES
Assisted pt up in the chair x 2 person with a steady. Pt resting in chair quietly. Chair alarm on. Call light and item need in reach. Spoke with Lin-pt's daughter and provided updates on pt's situation.  Electronically signed by Moon Rousseau RN on 3/29/2020 at 10:56 AM

## 2020-03-29 NOTE — PROGRESS NOTES
Hospitalist Progress Note      PCP: [facility],     Date of Admission: 3/26/2020        Subjective:   Awake, alert, knows she is in the hospital. Reports she feels very weak. Thirsty. Not really hungry today. Failed bed side swallow eval with recs for NPO till mental status better - reassess daily. Medications:  Reviewed    Infusion Medications    0.9% NaCl with KCl 40 mEq 75 mL/hr at 03/29/20 0224     Scheduled Medications    sodium chloride flush  10 mL Intravenous 2 times per day    aspirin  162 mg Oral Daily    losartan  100 mg Oral Daily    pantoprazole  40 mg Oral QAM AC    atorvastatin  20 mg Oral Nightly    sodium chloride flush  10 mL Intravenous 2 times per day    enoxaparin  40 mg Subcutaneous Daily    cefTRIAXone (ROCEPHIN) IV  1 g Intravenous Q24H     PRN Meds: hydrALAZINE, sodium chloride flush, sodium chloride flush, acetaminophen **OR** acetaminophen, polyethylene glycol, promethazine **OR** ondansetron      Intake/Output Summary (Last 24 hours) at 3/29/2020 1100  Last data filed at 3/29/2020 1047  Gross per 24 hour   Intake 900 ml   Output 500 ml   Net 400 ml       Exam:    /70   Pulse 111   Temp 98.4 °F (36.9 °C) (Oral)   Resp 16   Ht 5' 1\" (1.549 m)   Wt 155 lb 10.3 oz (70.6 kg)   SpO2 94%   BMI 29.41 kg/m²     General appearance: No apparent distress  HEENT: Pupils equal, round, and reactive to light. Conjunctivae/corneas clear. Neck: Supple, with full range of motion. No jugular venous distention. Trachea midline. Respiratory:  Normal respiratory effort. Clear to auscultation, bilaterally without Rales/Wheezes/Rhonchi. Cardiovascular: Regular rate and rhythm with normal S1/S2 without murmurs, rubs or gallops. Abdomen: Soft, non-tender, non-distended with normal bowel sounds. Musculoskeletal: No clubbing, cyanosis or edema bilaterally. Full range of motion without deformity.   Skin: Skin color, texture, turgor normal.  No rashes or lesions. Neurologic:  Residual L hemiparesis s/p prior CVA. Capillary Refill: Brisk,< 3 seconds   Peripheral Pulses: +2 palpable, equal bilaterally       Labs:   Recent Labs     03/27/20  0726 03/28/20  0603 03/29/20  0438   WBC 7.8 8.4 10.0   HGB 12.7 13.1 12.4   HCT 37.7 39.4 37.0    302 328     Recent Labs     03/27/20  0726 03/28/20  0603 03/29/20  0438   * 132* 132*   K 3.7 3.7 4.4   CL 93* 97* 98*   CO2 22 22 19*   BUN 8 10 10   CREATININE <0.5* 0.6 0.5*   CALCIUM 8.8 8.8 8.8   PHOS 2.6 2.5 2.0*     Recent Labs     03/26/20  1448   AST 29   ALT 33   BILITOT 0.7   ALKPHOS 102     Recent Labs     03/26/20  1448   INR 1.10     No results for input(s): CKTOTAL, TROPONINI in the last 72 hours. Assessment/Plan:      -Acute Toxic/metabolic encephalopathy - etiology unclear -  resolving.    -patient was increasingly confused, aggressive and combative  -suspected UTI - ruled out - rocephin stopped after 3 days. - CT head large remote MCA infarct, no acute pathology. Acute hyponatremia. probably hypovolemic - improving with IVF. Dementia  As above. Mental status improved. Essential hypertension  PRN hydralazine while NPO. Prior CVA with left-sided weakness  Now with worsening dysphagia. SLP involved. NPO pending improved mental status. Consider brain MRI if no clinical improvement. Dyslipidemia        Mechanical fall from wheelchair at home with contusion of the face, right chest  Also with numerous bruises b/l LE. Start PTOT  I am not sure if she will be able to return home.        DVT Prophylaxis: lovenx  Diet: Diet NPO Effective Now  Code Status: DNR-CCA        Brea Marques MD

## 2020-03-29 NOTE — PROGRESS NOTES
Pt awake in bed, pt alert to persona and place only. Pt follows commands. Pt denies any pain or discomfort. Pt VSS. Pt respirations are unlabored on room air. Pt repositioned in bed with pillow support. Pt currently NPO Reviewed plan of care with the pt. Pt wants to talk to her daughter Carmen Upton, called michelle on [phone] a busy signal. Will try again in few minutes. Pt informed.  Electronically signed by Real Gamble RN on 3/29/2020 at 9:38 AM

## 2020-03-29 NOTE — FLOWSHEET NOTE
03/29/20 0906   Vital Signs   BP (!) 176/85     Prn 10 mg IV hydralazine given to the pt as ordered by MD. Electronically signed by Joey Espinzoa RN on 3/29/2020 at 9:23 AM

## 2020-03-30 LAB
ALBUMIN SERPL-MCNC: 3.8 G/DL (ref 3.4–5)
ANION GAP SERPL CALCULATED.3IONS-SCNC: 18 MMOL/L (ref 3–16)
BLOOD CULTURE, ROUTINE: NORMAL
BUN BLDV-MCNC: 11 MG/DL (ref 7–20)
CALCIUM SERPL-MCNC: 9.3 MG/DL (ref 8.3–10.6)
CHLORIDE BLD-SCNC: 99 MMOL/L (ref 99–110)
CO2: 18 MMOL/L (ref 21–32)
CREAT SERPL-MCNC: <0.5 MG/DL (ref 0.6–1.2)
CULTURE, BLOOD 2: NORMAL
GFR AFRICAN AMERICAN: >60
GFR NON-AFRICAN AMERICAN: >60
GLUCOSE BLD-MCNC: 96 MG/DL (ref 70–99)
HCT VFR BLD CALC: 36.8 % (ref 36–48)
HEMOGLOBIN: 12.5 G/DL (ref 12–16)
MAGNESIUM: 1.8 MG/DL (ref 1.8–2.4)
MCH RBC QN AUTO: 32.2 PG (ref 26–34)
MCHC RBC AUTO-ENTMCNC: 34 G/DL (ref 31–36)
MCV RBC AUTO: 94.9 FL (ref 80–100)
PDW BLD-RTO: 14.6 % (ref 12.4–15.4)
PHOSPHORUS: 2.1 MG/DL (ref 2.5–4.9)
PLATELET # BLD: 317 K/UL (ref 135–450)
PMV BLD AUTO: 8.1 FL (ref 5–10.5)
POTASSIUM SERPL-SCNC: 4.4 MMOL/L (ref 3.5–5.1)
RBC # BLD: 3.88 M/UL (ref 4–5.2)
SODIUM BLD-SCNC: 135 MMOL/L (ref 136–145)
WBC # BLD: 12 K/UL (ref 4–11)

## 2020-03-30 PROCEDURE — 6370000000 HC RX 637 (ALT 250 FOR IP): Performed by: INTERNAL MEDICINE

## 2020-03-30 PROCEDURE — 97129 THER IVNTJ 1ST 15 MIN: CPT

## 2020-03-30 PROCEDURE — 92526 ORAL FUNCTION THERAPY: CPT

## 2020-03-30 PROCEDURE — 36415 COLL VENOUS BLD VENIPUNCTURE: CPT

## 2020-03-30 PROCEDURE — 97530 THERAPEUTIC ACTIVITIES: CPT

## 2020-03-30 PROCEDURE — 2060000000 HC ICU INTERMEDIATE R&B

## 2020-03-30 PROCEDURE — 85027 COMPLETE CBC AUTOMATED: CPT

## 2020-03-30 PROCEDURE — 80069 RENAL FUNCTION PANEL: CPT

## 2020-03-30 PROCEDURE — 94760 N-INVAS EAR/PLS OXIMETRY 1: CPT

## 2020-03-30 PROCEDURE — 83735 ASSAY OF MAGNESIUM: CPT

## 2020-03-30 PROCEDURE — 2580000003 HC RX 258: Performed by: INTERNAL MEDICINE

## 2020-03-30 PROCEDURE — 6360000002 HC RX W HCPCS: Performed by: INTERNAL MEDICINE

## 2020-03-30 RX ORDER — AMLODIPINE BESYLATE 5 MG/1
5 TABLET ORAL DAILY
Status: DISCONTINUED | OUTPATIENT
Start: 2020-03-30 | End: 2020-03-31

## 2020-03-30 RX ORDER — SODIUM CHLORIDE 9 MG/ML
INJECTION, SOLUTION INTRAVENOUS CONTINUOUS
Status: DISCONTINUED | OUTPATIENT
Start: 2020-03-30 | End: 2020-04-01

## 2020-03-30 RX ADMIN — AMLODIPINE BESYLATE 5 MG: 5 TABLET ORAL at 18:35

## 2020-03-30 RX ADMIN — ASPIRIN 162 MG: 81 TABLET, COATED ORAL at 09:05

## 2020-03-30 RX ADMIN — LOSARTAN POTASSIUM 100 MG: 100 TABLET, FILM COATED ORAL at 09:05

## 2020-03-30 RX ADMIN — POTASSIUM CHLORIDE AND SODIUM CHLORIDE: 900; 300 INJECTION, SOLUTION INTRAVENOUS at 10:25

## 2020-03-30 RX ADMIN — SODIUM CHLORIDE: 9 INJECTION, SOLUTION INTRAVENOUS at 12:37

## 2020-03-30 ASSESSMENT — PAIN SCALES - WONG BAKER
WONGBAKER_NUMERICALRESPONSE: 0

## 2020-03-30 ASSESSMENT — PAIN SCALES - PAIN ASSESSMENT IN ADVANCED DEMENTIA (PAINAD)
FACIALEXPRESSION: 0
FACIALEXPRESSION: 0
CONSOLABILITY: 0
BREATHING: 0
TOTALSCORE: 0
BREATHING: 0
BODYLANGUAGE: 0
NEGVOCALIZATION: 0
TOTALSCORE: 0
BREATHING: 0
NEGVOCALIZATION: 0
FACIALEXPRESSION: 0
BODYLANGUAGE: 0
BREATHING: 0
BREATHING: 0
NEGVOCALIZATION: 0
NEGVOCALIZATION: 0
CONSOLABILITY: 0
NEGVOCALIZATION: 0
BODYLANGUAGE: 0
TOTALSCORE: 0
TOTALSCORE: 0
CONSOLABILITY: 0
FACIALEXPRESSION: 0
CONSOLABILITY: 0
TOTALSCORE: 0
BREATHING: 0
CONSOLABILITY: 0
BODYLANGUAGE: 0
BODYLANGUAGE: 0
TOTALSCORE: 0
BREATHING: 0
TOTALSCORE: 0
NEGVOCALIZATION: 0
CONSOLABILITY: 0
FACIALEXPRESSION: 0
CONSOLABILITY: 0
FACIALEXPRESSION: 0
BODYLANGUAGE: 0
BODYLANGUAGE: 0
FACIALEXPRESSION: 0
TOTALSCORE: 0
FACIALEXPRESSION: 0
TOTALSCORE: 0
CONSOLABILITY: 0
BODYLANGUAGE: 0
CONSOLABILITY: 0
BODYLANGUAGE: 0
FACIALEXPRESSION: 0
NEGVOCALIZATION: 0
BODYLANGUAGE: 0
FACIALEXPRESSION: 0
BREATHING: 0
TOTALSCORE: 0
NEGVOCALIZATION: 0
CONSOLABILITY: 0
BREATHING: 0
BREATHING: 0

## 2020-03-30 NOTE — PROGRESS NOTES
infarction [facility]), Cerebral vascular disease, Hyperlipidemia, Hypertension, Peripheral vascular disease ([facility] [address] and Urinary incontinence. has a past surgical history that includes  section; Hysterectomy; and Carotid endarterectomy (Right, 2005). Restrictions  Restrictions/Precautions  Restrictions/Precautions: Fall Risk  Position Activity Restriction  Other position/activity restrictions: L sided hemiparesis previous CVA      Subjective   General  Chart Reviewed: Yes  Additional Pertinent Hx: Pt is an 80 y.o. female who presented to the ED on 3/26/20 with AMS s/p fall out of w/c and hitting head. Response To Previous Treatment: Patient with no complaints from previous session. Family / Caregiver Present: No  Referring Practitioner: Pérez Castle MD  Subjective  Subjective: Pt is agreeable to PT. Denies pain. Orientation  Orientation  Overall Orientation Status: Impaired  Orientation Level: Oriented to person;Disoriented to place; Disoriented to time;Disoriented to situation     Cognition   Cognition  Overall Cognitive Status: Exceptions  Arousal/Alertness: Delayed responses to stimuli  Following Commands: Follows one step commands with increased time; Follows one step commands with repetition  Attention Span: Attends with cues to redirect  Memory: Decreased recall of recent events;Decreased short term memory  Safety Judgement: Decreased awareness of need for assistance;Decreased awareness of need for safety  Problem Solving: Assistance required to generate solutions;Assistance required to implement solutions  Insights: Decreased awareness of deficits  Initiation: Requires cues for all  Sequencing: Requires cues for all  Cognition Comment: Per daughter pt is typically aware of who she is, where she is and whats going on.       Objective   Bed mobility  Sit to Supine: Dependent/Total;2 Person assistance  Scooting: Dependent/Total;2 Person assistance  Transfers  Sit to Stand: Maximum Assistance;2 Person Assistance(within will stedy - multiple trials. Almost dependent off toilet.)  Stand to sit: Maximum Assistance;2 Person Assistance(within will stedy)  Bed to Chair: Dependent/Total;2 Person Assistance(with stedy)  Ambulation  Ambulation?: No     Balance  Posture: Poor  Sitting - Static: Good  Standing - Static: Fair        AM-PAC Score  AM-PAC Inpatient Mobility Raw Score : 6 (03/30/20 1406)  AM-PAC Inpatient T-Scale Score : 23.55 (03/30/20 1406)  Mobility Inpatient CMS 0-100% Score: 100 (03/30/20 1406)  Mobility Inpatient CMS G-Code Modifier : CN (03/30/20 1406)          Goals  Short term goals  Time Frame for Short term goals: by acute discharge - all goals ongoing  Short term goal 1: bed mobility with min A  Short term goal 2: sit<>stand with min A  Short term goal 3: assess stand pivot transfer when appropriate  Patient Goals   Patient goals : unable to assess    Plan    Plan  Times per week: 3-5x/week  Current Treatment Recommendations: Functional Mobility Training  Safety Devices  Type of devices:  All fall risk precautions in place, Call light within reach, Chair alarm in place, Gait belt, Patient at risk for falls, Left in chair(PCA notified)     Therapy Time   Individual Concurrent Group Co-treatment   Time In 1130         Time Out 1200         Minutes 30         Timed Code Treatment Minutes: 30 Minutes       Christine Moody PT

## 2020-03-30 NOTE — PROGRESS NOTES
Occupational Therapy  Facility/Department: [address] MED SURG  Daily Treatment Note  Should patient be discharged prior to another treatment session, this note shall serve as the discharge summary. NAME: Sarthak Salas  : 11/3/1933  MRN: [id number]    Date of Service: 3/30/2020    Discharge Recommendations:  Continue to assess pending progress, Patient would benefit from continued therapy after discharge, 3-5 sessions per week       Assessment   Performance deficits / Impairments: Decreased functional mobility ; Decreased strength;Decreased endurance;Decreased ADL status; Decreased safe awareness;Decreased cognition;Decreased balance  Assessment: Pt seen in room, cotx with PT. More agreeable this date but still requiring assist of 2 with Gagan sherwood for safe transfer to bedside chair. Pt currently not safe for d/c home but family still hopeful that when she clears mentally that she can return home with the same amount of assist as PTA. Cont to assess. Cont OT POC. Prognosis: Fair  OT Education: OT Role;Precautions;Plan of Care;ADL Adaptive Strategies;Transfer Training  REQUIRES OT FOLLOW UP: Yes  Activity Tolerance  Activity Tolerance: Treatment limited secondary to decreased cognition  Safety Devices  Safety Devices in place: Yes  Type of devices: Call light within reach;Nurse notified; Left in chair;Chair alarm in place         Patient Diagnosis(es): The primary encounter diagnosis was Altered mental status, unspecified altered mental status type. Diagnoses of Contusion of face, initial encounter, Contusion of right chest wall, initial encounter, Fall from wheelchair, initial encounter, and Hypomagnesemia were also pertinent to this visit. has a past medical history of Cerebral artery occlusion with cerebral infarction [facility]), Cerebral vascular disease, Hyperlipidemia, Hypertension, Peripheral vascular disease ([facility] [address] and Urinary incontinence.    has a past surgical history that includes  section; Hysterectomy; and Carotid endarterectomy (Right, 01/07/2005). Restrictions  Restrictions/Precautions  Restrictions/Precautions: Fall Risk  Position Activity Restriction  Other position/activity restrictions: L sided hemiparesis previous CVA   Subjective   General  Chart Reviewed: Yes  Patient assessed for rehabilitation services?: Yes  Additional Pertinent Hx: Per Zane Harvey MD: Pt is \"80 y.o. female with PMH significant for dementia, CVA with left-sided weakness, PVD, hypertension, hyperlipidemia who presents to [facility] with acute encephalopathy. Per family patient was becoming more confused over the last day. Spoke with PCP who recommended starting Cipro as she was unable to come in for urinalysis and they were unable to obtain at home. Patient unable to give any history. Spoke with  daughter Pancho over the phone, says that they started noticing change in mental status a few days ago. They had tried to get a UA but were not able to and she was started empirically on cipro yesterday. This morning was more confused and fell while trying to get into wheelchair, hitting side of her head. No fevers at home, no cough or SOB and not endorsing any pain or localizing symptoms. Normally knows where she is and what is going on. \"  Family / Caregiver Present: No  Referring Practitioner: Shanda Saha MD  Diagnosis: Altered mental status  Subjective  Subjective: Seen in room; cotx with PT, agreed to OOB.   Looking for her \"lipstick\" which was not present in room      Orientation  Orientation  Overall Orientation Status: Impaired  Orientation Level: Oriented to person  Objective    ADL  Toileting: Dependent/Total(purewick present still)        Balance  Sitting Balance: Contact guard assistance  Standing Balance: Maximum assistance(in will stedy)  Bed mobility  Sit to Supine: Dependent/Total;2 Person assistance  Scooting: Dependent/Total;2 Person assistance  Transfers  Sit to stand: Maximum assistance;2

## 2020-03-30 NOTE — PROGRESS NOTES
[facility]   Speech Therapy  Daily Dysphagia Treatment Note        Harrison Jordan  AGE: 80 y.o. GENDER: female  : 11/3/1933  [id number]  EPISODE DATE:  3/26/2020  Patient Active Problem List   Diagnosis    Hemiplegia, late effect of cerebrovascular disease ([facility] [address]    Allergic rhinitis    Irritable bowel syndrome    Peripheral vascular disease ([facility] [address]    Cerebrovascular accident (CVA) due to occlusion of cerebral artery ([facility] [address]    Occlusion and stenosis of carotid artery with cerebral infarction    Irritable bladder    Overactive bladder    Essential hypertension    Mixed hyperlipidemia    Compression fracture of L1 lumbar vertebra (HCC)    Gastroesophageal reflux disease without esophagitis    Renal insufficiency    AMS (altered mental status)     Allergies   Allergen Reactions    Claritin [Loratadine]     Ultram [Tramadol] Other (See Comments)     Hallucinations     Treatment Diagnosis: Dysphagia       Chart review:   3/29 MD prog note: Acute Toxic/metabolic encephalopathy - etiology unclear -  resolving; CT head large remote MCA infarct, no acute pathology; dementia, mental status improved; Prior CVA with left-sided weakness, Now with worsening dysphagia. Consider brain MRI if no clinical improvement.   Subjective:     Current diet  NPO  Comments regarding tolerating Current Diet:     NA; NPO over w/e     Objective:     Pain   Patient Currently in Pain: Denies    Cognitive/Behavior    alert, confused, participative, visual hallucinations, able to be redirected; can become easily agitated    Presentations   Consistencies Administered: Dysphagia Soft and Bite-Sized (Dysphagia III), Dysphagia Pureed (Dysphagia I), Honey - teaspoon, Honey - cup    Positioning    upright in bed     PO Trials:  · Thin Liquids NA  · Nectar thick liquids NA  · Honey Thick liquids via tsp and cup: decreased lingual coordination for bolus formation, control and AP transit; delayed swallow initiation; decreased laryngeal elevation; delayed cough x2, and vocal quality change noted in >50% of trials   · Puree: decreased lingual coordination for bolus formation, control and AP transit; delayed swallow initiation; decreased laryngeal elevation; no cough, throat clear or VQ change  · Soft food NA  · Regular food NA    Dysphagia Tx:   · Pt alert, confused and hallucinating, yet participative with redirection and encouragement. · PO trials described as above: tolerated puree and meds crushed in puree with prolonged oral prep but adequate oral clearance and no overt s/s.  1 small pill whole in pudding required multiple swallows to clear, including teaspoons of liquid and repeat trials of puree. Honey thick liquids via tsp and cup result in variable delayed coughing and VQ change in >50% of trials  · Max cues for participation and rationale; pt confusion persists, no carryover    Goals:   Dysphagia Goals: The patient will tolerate repeat BSE when able. Recommend initiating puree/pudding thick liquids  New Goals: The pt will tolerate recommended diet without overt s/s of aspiration   The pt will tolerate honey thick liquids 10/10 via tsp without overt s/s of aspiration  The pt will participate in MBS if deemed clinically indicated  Assessment:   Impressions:   Dysphagia Diagnosis: Moderate-severe oral stage dysphagia, Moderate to severe pharyngeal stage dysphagia  · decreased lingual coordination for bolus formation, control and AP transit; delayed swallow initiation; decreased laryngeal elevation  · Delayed cough or VQ change in >50% of honey thick liquid trials via tsp and cup  · Reduced oral clearance of 1 small whole pill in pudding; better tolerated meds crushed in pudding  · No overt s/s of aspiration with puree/pudding trials   · Confusion and hallucinations persist  · If MD is concerned re: aspiration, an MBS may be completed with MD order.   However, pt's current cognitive status may hinder ability

## 2020-03-30 NOTE — PROGRESS NOTES
Final Result   No acute cardiopulmonary disease. CONSULTS:    None    ASSESSMENT AND PLAN:      Active Problems:    AMS (altered mental status)  Resolved Problems:    * No resolved hospital problems. *      Patient is a 80-year old female with dementia and old CVA with left-sided weakness who presented with acute metabolic encephalopathy. Cause is unclear. On admission there was some suspicion for UTI which was ruled out but patient finished 3 days of antibiotics. CT head showed old infarct . patient did have some acute hyponatremia which improved with IV fluids    Acute toxic/metabolic encephalopathy-improving  -Cause unclear    Acute hyponatremia-improving    Dementia  -Supportive treatment    Hypertension  -Monitor blood pressure  -CT home meds, add norvasc   -On PRN IV medications    Old CVA with left-sided weakness  -Speech-language following started on puréed diet  -PT OT    DVT Prophylaxis: lovenox  Diet: Dietary Nutrition Supplements: Frozen Oral Supplement  DIET DYSPHAGIA PUREED;  Code Status: DNR-CCA    PT/OT Eval Status:SNF    Discharge plan - possibly tomorrow    The patient and / or the family were informed of the results of any tests, a time was given to answer questions, a plan was proposed and they agreed with plan. Discussed with consulting physicians, nursing and social work     The note was completed using EMR. Every effort was made to ensure accuracy; however, inadvertent computerized transcription errors may be present.        Pedro Shetty MD

## 2020-03-31 ENCOUNTER — APPOINTMENT (OUTPATIENT)
Dept: MRI IMAGING | Age: 85
DRG: 064 | End: 2020-03-31
Payer: MEDICARE

## 2020-03-31 ENCOUNTER — APPOINTMENT (OUTPATIENT)
Dept: GENERAL RADIOLOGY | Age: 85
DRG: 064 | End: 2020-03-31
Payer: MEDICARE

## 2020-03-31 PROBLEM — J69.0 ASPIRATION PNEUMONIA (HCC): Status: ACTIVE | Noted: 2020-03-31

## 2020-03-31 PROBLEM — G93.41 ACUTE METABOLIC ENCEPHALOPATHY: Status: ACTIVE | Noted: 2020-03-31

## 2020-03-31 PROBLEM — I63.9 ACUTE ISCHEMIC STROKE (HCC): Status: ACTIVE | Noted: 2020-03-31

## 2020-03-31 PROBLEM — E87.29 HIGH ANION GAP METABOLIC ACIDOSIS: Status: ACTIVE | Noted: 2020-03-31

## 2020-03-31 PROBLEM — R13.10 DYSPHAGIA: Status: ACTIVE | Noted: 2020-03-31

## 2020-03-31 PROBLEM — I48.91 ATRIAL FIBRILLATION WITH RAPID VENTRICULAR RESPONSE (HCC): Status: ACTIVE | Noted: 2020-03-31

## 2020-03-31 PROBLEM — D72.829 LEUKOCYTOSIS: Status: ACTIVE | Noted: 2020-03-31

## 2020-03-31 PROBLEM — N39.0 UTI (URINARY TRACT INFECTION): Status: ACTIVE | Noted: 2020-03-31

## 2020-03-31 PROBLEM — F03.90 DEMENTIA (HCC): Status: ACTIVE | Noted: 2020-03-31

## 2020-03-31 LAB
ALBUMIN SERPL-MCNC: 3.7 G/DL (ref 3.4–5)
AMMONIA: 42 UMOL/L (ref 11–51)
ANION GAP SERPL CALCULATED.3IONS-SCNC: 17 MMOL/L (ref 3–16)
BASE EXCESS ARTERIAL: 0.3 MMOL/L (ref -3–3)
BETA-HYDROXYBUTYRATE: 1.16 MMOL/L (ref 0–0.27)
BUN BLDV-MCNC: 9 MG/DL (ref 7–20)
CALCIUM SERPL-MCNC: 9.3 MG/DL (ref 8.3–10.6)
CARBOXYHEMOGLOBIN ARTERIAL: 1.1 % (ref 0–1.5)
CHLORIDE BLD-SCNC: 96 MMOL/L (ref 99–110)
CO2: 19 MMOL/L (ref 21–32)
CREAT SERPL-MCNC: <0.5 MG/DL (ref 0.6–1.2)
EKG DIAGNOSIS: NORMAL
EKG Q-T INTERVAL: 274 MS
EKG QRS DURATION: 68 MS
EKG QTC CALCULATION (BAZETT): 415 MS
EKG R AXIS: 5 DEGREES
EKG T AXIS: 126 DEGREES
EKG VENTRICULAR RATE: 138 BPM
FOLATE: >20 NG/ML (ref 4.78–24.2)
GFR AFRICAN AMERICAN: >60
GFR NON-AFRICAN AMERICAN: >60
GLUCOSE BLD-MCNC: 96 MG/DL (ref 70–99)
HCO3 ARTERIAL: 24.2 MMOL/L (ref 21–29)
HCT VFR BLD CALC: 39.6 % (ref 36–48)
HEMOGLOBIN, ART, EXTENDED: 12.3 G/DL (ref 12–16)
HEMOGLOBIN: 13.2 G/DL (ref 12–16)
LACTIC ACID: 0.9 MMOL/L (ref 0.4–2)
MAGNESIUM: 1.8 MG/DL (ref 1.8–2.4)
MCH RBC QN AUTO: 31.7 PG (ref 26–34)
MCHC RBC AUTO-ENTMCNC: 33.2 G/DL (ref 31–36)
MCV RBC AUTO: 95.5 FL (ref 80–100)
METHEMOGLOBIN ARTERIAL: 0.5 %
O2 CONTENT ARTERIAL: 16 ML/DL
O2 SAT, ARTERIAL: 95.5 %
O2 THERAPY: ABNORMAL
PCO2 ARTERIAL: 36.1 MMHG (ref 35–45)
PDW BLD-RTO: 14.6 % (ref 12.4–15.4)
PH ARTERIAL: 7.44 (ref 7.35–7.45)
PHOSPHORUS: 2.6 MG/DL (ref 2.5–4.9)
PLATELET # BLD: 341 K/UL (ref 135–450)
PMV BLD AUTO: 8.4 FL (ref 5–10.5)
PO2 ARTERIAL: 73.2 MMHG (ref 75–108)
POTASSIUM SERPL-SCNC: 3.9 MMOL/L (ref 3.5–5.1)
RBC # BLD: 4.14 M/UL (ref 4–5.2)
SODIUM BLD-SCNC: 132 MMOL/L (ref 136–145)
T4 FREE: 1.1 NG/DL (ref 0.9–1.8)
TCO2 ARTERIAL: 25.3 MMOL/L
TSH SERPL DL<=0.05 MIU/L-ACNC: 4.93 UIU/ML (ref 0.27–4.2)
VITAMIN B-12: 1797 PG/ML (ref 211–911)
WBC # BLD: 12.2 K/UL (ref 4–11)

## 2020-03-31 PROCEDURE — 6360000002 HC RX W HCPCS: Performed by: INTERNAL MEDICINE

## 2020-03-31 PROCEDURE — 83735 ASSAY OF MAGNESIUM: CPT

## 2020-03-31 PROCEDURE — 84439 ASSAY OF FREE THYROXINE: CPT

## 2020-03-31 PROCEDURE — 84443 ASSAY THYROID STIM HORMONE: CPT

## 2020-03-31 PROCEDURE — 71046 X-RAY EXAM CHEST 2 VIEWS: CPT

## 2020-03-31 PROCEDURE — 6370000000 HC RX 637 (ALT 250 FOR IP): Performed by: INTERNAL MEDICINE

## 2020-03-31 PROCEDURE — 2580000003 HC RX 258: Performed by: INTERNAL MEDICINE

## 2020-03-31 PROCEDURE — 97530 THERAPEUTIC ACTIVITIES: CPT | Performed by: PHYSICAL THERAPIST

## 2020-03-31 PROCEDURE — 6370000000 HC RX 637 (ALT 250 FOR IP)

## 2020-03-31 PROCEDURE — 94760 N-INVAS EAR/PLS OXIMETRY 1: CPT

## 2020-03-31 PROCEDURE — 36415 COLL VENOUS BLD VENIPUNCTURE: CPT

## 2020-03-31 PROCEDURE — 36600 WITHDRAWAL OF ARTERIAL BLOOD: CPT

## 2020-03-31 PROCEDURE — 82746 ASSAY OF FOLIC ACID SERUM: CPT

## 2020-03-31 PROCEDURE — 82803 BLOOD GASES ANY COMBINATION: CPT

## 2020-03-31 PROCEDURE — 2580000003 HC RX 258: Performed by: HOSPITALIST

## 2020-03-31 PROCEDURE — 2060000000 HC ICU INTERMEDIATE R&B

## 2020-03-31 PROCEDURE — 82607 VITAMIN B-12: CPT

## 2020-03-31 PROCEDURE — 92526 ORAL FUNCTION THERAPY: CPT

## 2020-03-31 PROCEDURE — 2500000003 HC RX 250 WO HCPCS: Performed by: INTERNAL MEDICINE

## 2020-03-31 PROCEDURE — 70551 MRI BRAIN STEM W/O DYE: CPT

## 2020-03-31 PROCEDURE — 93010 ELECTROCARDIOGRAM REPORT: CPT | Performed by: INTERNAL MEDICINE

## 2020-03-31 PROCEDURE — 85027 COMPLETE CBC AUTOMATED: CPT

## 2020-03-31 PROCEDURE — 82140 ASSAY OF AMMONIA: CPT

## 2020-03-31 PROCEDURE — 82010 KETONE BODYS QUAN: CPT

## 2020-03-31 PROCEDURE — 83605 ASSAY OF LACTIC ACID: CPT

## 2020-03-31 PROCEDURE — 97535 SELF CARE MNGMENT TRAINING: CPT

## 2020-03-31 PROCEDURE — 80069 RENAL FUNCTION PANEL: CPT

## 2020-03-31 PROCEDURE — 97530 THERAPEUTIC ACTIVITIES: CPT

## 2020-03-31 PROCEDURE — 97129 THER IVNTJ 1ST 15 MIN: CPT

## 2020-03-31 PROCEDURE — 93005 ELECTROCARDIOGRAM TRACING: CPT | Performed by: NURSE PRACTITIONER

## 2020-03-31 RX ORDER — DILTIAZEM HYDROCHLORIDE 5 MG/ML
10 INJECTION INTRAVENOUS ONCE
Status: COMPLETED | OUTPATIENT
Start: 2020-03-31 | End: 2020-03-31

## 2020-03-31 RX ORDER — ATORVASTATIN CALCIUM 40 MG/1
40 TABLET, FILM COATED ORAL NIGHTLY
Status: DISCONTINUED | OUTPATIENT
Start: 2020-03-31 | End: 2020-04-02 | Stop reason: HOSPADM

## 2020-03-31 RX ADMIN — DILTIAZEM HYDROCHLORIDE 30 MG: 30 TABLET, FILM COATED ORAL at 11:05

## 2020-03-31 RX ADMIN — PANTOPRAZOLE SODIUM 40 MG: 40 TABLET, DELAYED RELEASE ORAL at 05:49

## 2020-03-31 RX ADMIN — DILTIAZEM HYDROCHLORIDE 30 MG: 30 TABLET, FILM COATED ORAL at 23:58

## 2020-03-31 RX ADMIN — PIPERACILLIN AND TAZOBACTAM 3.38 G: 3; .375 INJECTION, POWDER, FOR SOLUTION INTRAVENOUS at 23:57

## 2020-03-31 RX ADMIN — ATORVASTATIN CALCIUM 40 MG: 40 TABLET, FILM COATED ORAL at 20:54

## 2020-03-31 RX ADMIN — LOSARTAN POTASSIUM 100 MG: 100 TABLET, FILM COATED ORAL at 09:58

## 2020-03-31 RX ADMIN — DILTIAZEM HYDROCHLORIDE 30 MG: 30 TABLET, FILM COATED ORAL at 11:06

## 2020-03-31 RX ADMIN — SODIUM CHLORIDE: 9 INJECTION, SOLUTION INTRAVENOUS at 01:47

## 2020-03-31 RX ADMIN — Medication 10 ML: at 09:58

## 2020-03-31 RX ADMIN — DILTIAZEM HYDROCHLORIDE 30 MG: 30 TABLET, FILM COATED ORAL at 18:23

## 2020-03-31 RX ADMIN — ENOXAPARIN SODIUM 40 MG: 40 INJECTION SUBCUTANEOUS at 09:58

## 2020-03-31 RX ADMIN — SODIUM CHLORIDE, PRESERVATIVE FREE 10 ML: 5 INJECTION INTRAVENOUS at 20:55

## 2020-03-31 RX ADMIN — PIPERACILLIN AND TAZOBACTAM 3.38 G: 3; .375 INJECTION, POWDER, FOR SOLUTION INTRAVENOUS at 15:45

## 2020-03-31 RX ADMIN — ASPIRIN 162 MG: 81 TABLET, COATED ORAL at 09:58

## 2020-03-31 RX ADMIN — DILTIAZEM HYDROCHLORIDE 10 MG: 5 INJECTION INTRAVENOUS at 05:09

## 2020-03-31 ASSESSMENT — PAIN SCALES - GENERAL
PAINLEVEL_OUTOF10: 0

## 2020-03-31 NOTE — PROGRESS NOTES
honey thick liquid trials via tsp and cup; cough noted after nectar tsp x1  · No overt s/s of aspiration with puree/pudding trials   · If MD is concerned re: aspiration, an MBS may be completed with MD order. Diet Recommendations:  Puree diet texture  Pudding (spoon thick) liquids via tsp       Recommended Form of Meds: Crushed in puree as able    Strategies:   Compensatory Swallowing Strategies: Small bites/sips, Total feed, Eat/Feed slowly, Remain upright for 30-45 minutes after meals, Upright as possible for all oral intake, Liquid by spoon only    Education:  Consulted and agree with results and recommendations: RN  Patient Education: Review [facility] pt with no retention  Patient Education Response: No evidence of learning    Prognosis:   Fair-guarded    Plan:     Continue Dysphagia Therapy: yes  Interventions: Therapeutic Interventions: Therapeutic PO trials with SLP, Patient/Family education, Oral care, Oral motor exercises  Duration/Frequency of therapy while on unit: Duration/Frequency of Treatment  Duration/Frequency of Treatment: 3-5x/week while on acute  Discharge Instructions:   Anticipate Yes for further skilled Speech Therapy for Dysphagia at discharge    This note serves as a D/C Summary in the event that this patient is discharged prior to the next therapy session.     Coded treatment time:10  Total treatment time: 27    Jennyfer Bender MS, CCC-SLP #[id number]  Speech Language Pathologist   3/31/2020 at [id number] AM

## 2020-03-31 NOTE — PROGRESS NOTES
suppository 650 mg, Q6H PRN  polyethylene glycol (GLYCOLAX) packet 17 g, Daily PRN  promethazine (PHENERGAN) tablet 12.5 mg, Q6H PRN    Or  ondansetron (ZOFRAN) injection 4 mg, Q6H PRN  enoxaparin (LOVENOX) injection 40 mg, Daily        Objective:  /66   Pulse 79   Temp 97.8 °F (36.6 °C) (Oral)   Resp 17   Ht 5' 1\" (1.549 m)   Wt 151 lb 14.4 oz (68.9 kg)   SpO2 93%   BMI 28.70 kg/m²     Intake/Output Summary (Last 24 hours) at 3/31/2020 1607  Last data filed at 3/31/2020 0547  Gross per 24 hour   Intake 108.5 ml   Output 1001 ml   Net -892.5 ml      Wt Readings from Last 3 Encounters:   03/31/20 151 lb 14.4 oz (68.9 kg)   10/03/17 127 lb 6.8 oz (57.8 kg)   12/07/16 150 lb (68 kg)       General appearance:  Appears comfortable, sitting in bed  Eyes: Sclera clear. Pupils equal.  ENT: Moist oral mucosa. Trachea midline, no adenopathy. Cardiovascular: Regular rhythm, normal S1, S2. No murmur. No edema in lower extremities  Respiratory: BL rhonchi. No wheezes or rales. GI: Abdomen soft, no tenderness, not distended, normal bowel sounds  Musculoskeletal: LUE contracture  Neurology: L sided weakness. No speech or motor deficits  Psych: Normal affect. Alert and oriented in time, place and person  Skin: Warm, dry, normal turgor  Extremity exam shows brisk capillary refill. Peripheral pulses are palpable in lower extremities     Labs and Tests:  CBC:   Recent Labs     03/29/20  0438 03/30/20  0628 03/31/20  0505   WBC 10.0 12.0* 12.2*   HGB 12.4 12.5 13.2    317 341     BMP:    Recent Labs     03/29/20  0438 03/30/20  0628 03/31/20  0505   * 135* 132*   K 4.4 4.4 3.9   CL 98* 99 96*   CO2 19* 18* 19*   BUN 10 11 9   CREATININE 0.5* <0.5* <0.5*   GLUCOSE 87 96 96     Hepatic: No results for input(s): AST, ALT, ALB, BILITOT, ALKPHOS in the last 72 hours. MRI BRAIN WO CONTRAST   Final Result   Acute infarct within the left occipital lobe with possible petechial   hemorrhage.       Additional surrounding acute microinfarcts. The findings were sent to the Radiology Results [address] at 3:16   pm on 3/31/2020to be communicated to a licensed caregiver. XR CHEST STANDARD (2 VW)   Final Result   Probable small left-sided pleural effusion and mild left basilar airspace   disease, atelectasis versus pneumonia. CT CERVICAL SPINE WO CONTRAST   Final Result   No acute abnormality of the cervical spine. CT CHEST WO CONTRAST   Final Result   No acute intrathoracic abnormality. CT Head WO Contrast   Final Result   No acute intracranial abnormality. Large remote right MCA distribution infarct, similar in appearance. Atrophy and white matter changes are again seen consistent chronic small   vessel ischemic disease. XR CHEST PORTABLE   Final Result   No acute cardiopulmonary disease. Problem List  Principal Problem:    Acute metabolic encephalopathy  Active Problems:    Irritable bowel syndrome    Peripheral vascular disease ([address]    Cerebrovascular accident (CVA) due to occlusion of cerebral artery (HCC)    Essential hypertension    Mixed hyperlipidemia    Hyponatremia    AMS (altered mental status)    UTI (urinary tract infection)    High anion gap metabolic acidosis    Leukocytosis    Atrial fibrillation with rapid ventricular response (HCC)    Dementia (HCC)    Dysphagia    Aspiration pneumonia (HCC)    Acute ischemic stroke ([address]  Resolved Problems:    * No resolved hospital problems. *       Assessment & Plan:   1. Check MRI Brain to r/o stroke  2. Start Cardizem 30 mg PO q6h, wean off Cardizem gtt  3. Not a good candidate for [facility] given fall risk/age, risks outweigh benefits and discussed with daughter who agrees  4. Check ABG, B Hydroxybutarate  5. Check TSH, Free T4  6. Check Ammonia  7. Check B12  8. PT/OT recommend SNF  9. PMR consult to consider ARU  10. Start Zosyn IV for aspiration PNA  11.  CXR PA/Lateral for cough  12. SW consult

## 2020-03-31 NOTE — PROGRESS NOTES
Physical Therapy  Facility/Department: [facility] 5 PROGRESSIVE CARE  Daily Treatment Note  NAME: Derick Marley  : 11/3/1933  MRN: [id number]    Date of Service: 3/31/2020    Discharge Recommendations:  3-5 sessions per week      Derick Marley scored a 8/24 on the AM-PAC short mobility form. Current research shows that an AM-PAC score of 17 or less is typically not associated with a discharge to the patient's home setting. Based on the patients AM-PAC score and their current functional mobility deficits, it is recommended that the patient have 3-5 sessions per week of Physical Therapy at d/c to increase the patients independence. If patient discharges prior to next session this note will serve as a discharge summary. Please see below for the latest assessment towards goals. Assessment   Body structures, Functions, Activity limitations: Decreased posture  Assessment: Pt is an 80 y.o. female who presented to the ED on 3/26/20 with AMS s/p fall out of w/c and hitting head. Patient reportedly lives with multiple family members, has 24/ supervision, and propels self in w/c. Pt continues to be well below her baseline. Recommend continued therapy at discharge to address deficits to allow pt to attain her max potential as she is currently needing more assist than family could provide safely at home  Treatment Diagnosis: impaired mobility  Prognosis: Fair  Decision Making: High Complexity  Clinical Presentation: evolving  PT Education: General Safety  Barriers to Learning: cognition  REQUIRES PT FOLLOW UP: Yes  Activity Tolerance  Activity Tolerance: Patient limited by cognitive status; Patient limited by endurance     Patient Diagnosis(es): The primary encounter diagnosis was Altered mental status, unspecified altered mental status type.  Diagnoses of Contusion of face, initial encounter, Contusion of right chest wall, initial encounter, Fall from wheelchair, initial encounter, and Hypomagnesemia were also

## 2020-03-31 NOTE — PROGRESS NOTES
altered mental status type. Diagnoses of Contusion of face, initial encounter, Contusion of right chest wall, initial encounter, Fall from wheelchair, initial encounter, and Hypomagnesemia were also pertinent to this visit. has a past medical history of Cerebral artery occlusion with cerebral infarction [facility]), Cerebral vascular disease, Hyperlipidemia, Hypertension, Peripheral vascular disease ([facility] [address] and Urinary incontinence. has a past surgical history that includes  section; Hysterectomy; and Carotid endarterectomy (Right, 2005). Restrictions  Restrictions/Precautions  Restrictions/Precautions: Fall Risk  Position Activity Restriction  Other position/activity restrictions: L sided hemiparesis previous CVA; contracted KERON (flex elbow/wrist, IR and adducted shoulder)  Subjective   General  Chart Reviewed: Yes  Patient assessed for rehabilitation services?: Yes  Additional Pertinent Hx: Per Justin Ho MD: Pt is \"80 y.o. female with PMH significant for dementia, CVA with left-sided weakness, PVD, hypertension, hyperlipidemia who presents to [facility] with acute encephalopathy. Per family patient was becoming more confused over the last day. Spoke with PCP who recommended starting Cipro as she was unable to come in for urinalysis and they were unable to obtain at home. Patient unable to give any history. Spoke with  daughter Carlos Carson over the phone, says that they started noticing change in mental status a few days ago. They had tried to get a UA but were not able to and she was started empirically on cipro yesterday. This morning was more confused and fell while trying to get into wheelchair, hitting side of her head. No fevers at home, no cough or SOB and not endorsing any pain or localizing symptoms. Normally knows where she is and what is going on.  \"  Family / Caregiver Present: No  Referring Practitioner: Aliyah Urbina MD  Diagnosis: Altered mental status  Subjective  Subjective: Pt

## 2020-03-31 NOTE — FLOWSHEET NOTE
Pt received from [facility]. Report received. Pt in A Fib with RVR. Bolus Cardizem 2ml given per order and pt started on cardizem gtt at 5 ml/hr. Transfer assessment completed and charted. Pure wick placed after depends changed due to pt incontinent of urine. Pt alert and oriented at this time. Denies pain, shortness of breath or dizziness. Pt oriented to room bed unit and call light. Call light in reach and bed alarm on. Telesitter monitor in room and functioning. Will continue to monitor pt.

## 2020-04-01 ENCOUNTER — APPOINTMENT (OUTPATIENT)
Dept: MRI IMAGING | Age: 85
DRG: 064 | End: 2020-04-01
Payer: MEDICARE

## 2020-04-01 LAB
ALBUMIN SERPL-MCNC: 3.1 G/DL (ref 3.4–5)
ANION GAP SERPL CALCULATED.3IONS-SCNC: 12 MMOL/L (ref 3–16)
BUN BLDV-MCNC: 14 MG/DL (ref 7–20)
CALCIUM SERPL-MCNC: 8.7 MG/DL (ref 8.3–10.6)
CHLORIDE BLD-SCNC: 99 MMOL/L (ref 99–110)
CO2: 24 MMOL/L (ref 21–32)
CREAT SERPL-MCNC: 0.7 MG/DL (ref 0.6–1.2)
ESTIMATED AVERAGE GLUCOSE: 96.8 MG/DL
GFR AFRICAN AMERICAN: >60
GFR NON-AFRICAN AMERICAN: >60
GLUCOSE BLD-MCNC: 120 MG/DL (ref 70–99)
HBA1C MFR BLD: 5 %
HCT VFR BLD CALC: 34.6 % (ref 36–48)
HEMOGLOBIN: 11.6 G/DL (ref 12–16)
LV EF: 68 %
LVEF MODALITY: NORMAL
MAGNESIUM: 1.7 MG/DL (ref 1.8–2.4)
MCH RBC QN AUTO: 32.2 PG (ref 26–34)
MCHC RBC AUTO-ENTMCNC: 33.7 G/DL (ref 31–36)
MCV RBC AUTO: 95.5 FL (ref 80–100)
PDW BLD-RTO: 14.7 % (ref 12.4–15.4)
PHOSPHORUS: 2.8 MG/DL (ref 2.5–4.9)
PLATELET # BLD: 301 K/UL (ref 135–450)
PMV BLD AUTO: 8.3 FL (ref 5–10.5)
POTASSIUM SERPL-SCNC: 3.4 MMOL/L (ref 3.5–5.1)
RBC # BLD: 3.62 M/UL (ref 4–5.2)
SODIUM BLD-SCNC: 135 MMOL/L (ref 136–145)
WBC # BLD: 11.7 K/UL (ref 4–11)

## 2020-04-01 PROCEDURE — 92526 ORAL FUNCTION THERAPY: CPT

## 2020-04-01 PROCEDURE — 83735 ASSAY OF MAGNESIUM: CPT

## 2020-04-01 PROCEDURE — 85027 COMPLETE CBC AUTOMATED: CPT

## 2020-04-01 PROCEDURE — 80069 RENAL FUNCTION PANEL: CPT

## 2020-04-01 PROCEDURE — 36415 COLL VENOUS BLD VENIPUNCTURE: CPT

## 2020-04-01 PROCEDURE — 6360000002 HC RX W HCPCS: Performed by: INTERNAL MEDICINE

## 2020-04-01 PROCEDURE — 97530 THERAPEUTIC ACTIVITIES: CPT

## 2020-04-01 PROCEDURE — 97535 SELF CARE MNGMENT TRAINING: CPT

## 2020-04-01 PROCEDURE — 83036 HEMOGLOBIN GLYCOSYLATED A1C: CPT

## 2020-04-01 PROCEDURE — 2580000003 HC RX 258: Performed by: HOSPITALIST

## 2020-04-01 PROCEDURE — 2580000003 HC RX 258: Performed by: INTERNAL MEDICINE

## 2020-04-01 PROCEDURE — 6370000000 HC RX 637 (ALT 250 FOR IP): Performed by: INTERNAL MEDICINE

## 2020-04-01 PROCEDURE — 2060000000 HC ICU INTERMEDIATE R&B

## 2020-04-01 PROCEDURE — 97129 THER IVNTJ 1ST 15 MIN: CPT

## 2020-04-01 PROCEDURE — 94760 N-INVAS EAR/PLS OXIMETRY 1: CPT

## 2020-04-01 PROCEDURE — 97530 THERAPEUTIC ACTIVITIES: CPT | Performed by: PHYSICAL THERAPIST

## 2020-04-01 PROCEDURE — 70544 MR ANGIOGRAPHY HEAD W/O DYE: CPT

## 2020-04-01 RX ORDER — MAGNESIUM SULFATE IN WATER 40 MG/ML
2 INJECTION, SOLUTION INTRAVENOUS ONCE
Status: COMPLETED | OUTPATIENT
Start: 2020-04-01 | End: 2020-04-01

## 2020-04-01 RX ORDER — POTASSIUM CHLORIDE 750 MG/1
40 TABLET, FILM COATED, EXTENDED RELEASE ORAL
Status: DISCONTINUED | OUTPATIENT
Start: 2020-04-01 | End: 2020-04-01

## 2020-04-01 RX ADMIN — DILTIAZEM HYDROCHLORIDE 30 MG: 30 TABLET, FILM COATED ORAL at 06:35

## 2020-04-01 RX ADMIN — DILTIAZEM HYDROCHLORIDE 30 MG: 30 TABLET, FILM COATED ORAL at 18:25

## 2020-04-01 RX ADMIN — MAGNESIUM SULFATE HEPTAHYDRATE 2 G: 40 INJECTION, SOLUTION INTRAVENOUS at 12:51

## 2020-04-01 RX ADMIN — LOSARTAN POTASSIUM 100 MG: 100 TABLET, FILM COATED ORAL at 09:08

## 2020-04-01 RX ADMIN — POTASSIUM BICARBONATE 40 MEQ: 782 TABLET, EFFERVESCENT ORAL at 15:43

## 2020-04-01 RX ADMIN — SODIUM CHLORIDE: 9 INJECTION, SOLUTION INTRAVENOUS at 00:03

## 2020-04-01 RX ADMIN — DILTIAZEM HYDROCHLORIDE 30 MG: 30 TABLET, FILM COATED ORAL at 23:23

## 2020-04-01 RX ADMIN — PIPERACILLIN AND TAZOBACTAM 3.38 G: 3; .375 INJECTION, POWDER, FOR SOLUTION INTRAVENOUS at 07:27

## 2020-04-01 RX ADMIN — RIVAROXABAN 20 MG: 20 TABLET, FILM COATED ORAL at 18:25

## 2020-04-01 RX ADMIN — DILTIAZEM HYDROCHLORIDE 30 MG: 30 TABLET, FILM COATED ORAL at 12:12

## 2020-04-01 RX ADMIN — ATORVASTATIN CALCIUM 40 MG: 40 TABLET, FILM COATED ORAL at 19:43

## 2020-04-01 RX ADMIN — PIPERACILLIN AND TAZOBACTAM 3.38 G: 3; .375 INJECTION, POWDER, FOR SOLUTION INTRAVENOUS at 23:23

## 2020-04-01 RX ADMIN — PANTOPRAZOLE SODIUM 40 MG: 40 TABLET, DELAYED RELEASE ORAL at 07:27

## 2020-04-01 RX ADMIN — PIPERACILLIN AND TAZOBACTAM 3.38 G: 3; .375 INJECTION, POWDER, FOR SOLUTION INTRAVENOUS at 15:43

## 2020-04-01 RX ADMIN — Medication 10 ML: at 19:43

## 2020-04-01 ASSESSMENT — PAIN SCALES - WONG BAKER
WONGBAKER_NUMERICALRESPONSE: 0

## 2020-04-01 ASSESSMENT — PAIN SCALES - GENERAL
PAINLEVEL_OUTOF10: 0

## 2020-04-01 NOTE — CONSULTS
Consult Note  Physical Medicine and Rehabilitation    Patient: Jeremy Benavides  [id number]  Date: 4/1/2020      Chief Complaint: altered mental status    History of Present Illness/Hospital Course:  Ms. 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 Per family, patient had become more confused over the course of a few days. On the morning of admission she had a fall while trying to get into her wheelchair and hit the side of her head. CT head was negative for any acute abnormalities. Infectious work-up was negative but treated for UTI as she had been receiving antibiotics as outpatient. Course complicated by Afib with RVR and progressive confusion/encephalopathy. MRI brain then obtained and revealed acute ischemic left PCA stroke. Neurology recommending anticoagulation. Course also complicated by aspiration pneumonia and new onset dysphagia. Currently, patient reports feeling \"fine. \" She is a poor historian and is unable to provide details regarding her admission. She reports baseline left side weakness and tightness (affecting arm >leg). She denies headache, vision change, tingling/numbness, or difficulty with speech. Per family report she is still not at mental status baseline. Patient underwent rehab following her prior stroke and is willing to consider this again.      Prior Level of Function:  Independent for wheelchair mobility, toileting, feeding  HHA assists with bath    Current Level of Function:  Mod-max assist x 2 for mobility  Set-up to dependent for ADLs      Pertinent Social History:  Support: Lives with , daughter, son in law, and granddaughter  Home set-up: 2 level home with [address], ramped entry    Past Medical History:   Diagnosis Date    Cerebral artery occlusion with cerebral infarction ([facility] [address]     Cerebral vascular disease    
chloride flush  10 mL Intravenous 2 times per day     Continuous Infusions:   dilTIAZem Stopped (03/31/20 1105)    sodium chloride 50 mL/hr at 04/01/20 0003     Medications Prior to Admission:   ciprofloxacin (CIPRO) 250 MG tablet, Take 1 tablet by mouth 2 times daily for 5 days  omeprazole (PRILOSEC) 20 MG delayed release capsule, TAKE ONE CAPSULE BY MOUTH DAILY  losartan (COZAAR) 100 MG tablet, TAKE ONE TABLET BY MOUTH DAILY  simvastatin (ZOCOR) 40 MG tablet, TAKE ONE TABLET BY MOUTH EVERY EVENING  diclofenac (VOLTAREN) 75 MG EC tablet, TAKE ONE TABLET BY MOUTH TWICE A DAY  aspirin 81 MG EC tablet, Take 162 mg by mouth daily. Allergies   Allergen Reactions    Claritin [Loratadine]     Ultram [Tramadol] Other (See Comments)     Hallucinations     History reviewed. No pertinent family history. Social History     Tobacco Use   Smoking Status Never Smoker   Smokeless Tobacco Never Used     Social History     Substance and Sexual Activity   Drug Use No     Social History     Substance and Sexual Activity   Alcohol Use Yes    Alcohol/week: 1.0 standard drinks    Types: 1 Glasses of wine per week    Comment: nightly     ROS:  Constitutional- No weight loss or fevers  Eyes- No diplopia. No photophobia. Ears/nose/throat- + dysphagia. No Dysarthria  Cardiovascular- No palpitations. No chest pain  Respiratory- No dyspnea. No Cough  Gastrointestinal- No Abdominal pain. No Vomiting. Genitourinary- No incontinence. No urinary retention  Musculoskeletal- No myalgia. No arthralgia  Skin- No rash. No easy bruising. Psychiatric- No depression. No anxiety  Endocrine- No diabetes. No thyroid issues. Hematologic- No bleeding difficulty. No fatigue  Neurologic- chronic weakness. No Headache. Exam:  Blood pressure 133/64, pulse 74, temperature 98.1 °F (36.7 °C), temperature source Oral, resp. rate 18, height 5' 1\" (1.549 m), weight 155 lb 13.8 oz (70.7 kg), SpO2 91 %.   Constitutional    Vital signs: BP, HR, and RR

## 2020-04-01 NOTE — PROGRESS NOTES
to/from stedy                       Cognition  Overall Cognitive Status: Exceptions  Arousal/Alertness: Delayed responses to stimuli  Following Commands: Follows one step commands with increased time; Follows one step commands with repetition  Attention Span: Attends with cues to redirect  Memory: Decreased recall of recent events;Decreased short term memory  Safety Judgement: Decreased awareness of need for assistance;Decreased awareness of need for safety  Problem Solving: Assistance required to generate solutions;Assistance required to implement solutions  Insights: Decreased awareness of deficits  Initiation: Requires cues for some  Sequencing: Requires cues for some  Cognition Comment: Per daughter pt is typically aware of who she is, where she is and whats going on.             Plan   Plan  Times per week: 3-5  Current Treatment Recommendations: Functional Mobility Training, Strengthening, Endurance Training, Balance Training, Safety Education & Training, Equipment Evaluation, Education, & procurement, Patient/Caregiver Education & Training, Self-Care / ADL    AM-PAC Score        AM-PAC Inpatient Daily Activity Raw Score: 12 (04/01/20 0903)  AM-PAC Inpatient ADL T-Scale Score : 30.6 (04/01/20 0903)  ADL Inpatient CMS 0-100% Score: 66.57 (04/01/20 0903)  ADL Inpatient CMS G-Code Modifier : CL (04/01/20 0009)    Goals  Short term goals  Time Frame for Short term goals: prior to d/c; ongoing 4/1  Short term goal 1: Pt will complete sit <> stand transfers with min A  Short term goal 2: Pt will complete stand pivot transfer with min A   Short term goal 3: Pt will propel w/c household distances with SPV  Short term goal 4: Pt will complete toileting with min A   Patient Goals   Patient goals : Pt continues to repeat self in saying \"I want to go home\"       Therapy Time   Individual Concurrent Group Co-treatment   Time In 0807         Time Out 0903         Minutes 56         Timed Code Treatment Minutes: 56 Minutes Carlos Relic, OTR/L

## 2020-04-01 NOTE — PROGRESS NOTES
Speech Language Pathology    135PM: dysphagia treatment attempted. Pt currently out of room for MRA. Will retry later today as schedule permits.      Anibal Mireles MS, CCC-SLP #[id number]  Speech Language Pathologist

## 2020-04-01 NOTE — CARE COORDINATION
Case Management:  Referral was sent to IP rehab here at [facility] await response. Called and talked to daughter Juan Jose Astorga to discuss other options. Juan Jose Astorga would like her mom to go to [facility]. Mehrdad Hoyt IP rehab is [facility] not able to take her. Patient lives with Juan Jose Astorga and her  and her dad so Juan Jose Astorga needs patient to feed and toilet herself in order to come back home. Await response from IP rehab.   Electronically signed by Olinda Walsh on 4/1/2020 at 4:42 PM

## 2020-04-01 NOTE — PROGRESS NOTES
emphasized . The notebook also provides education on Stroke community resources and stroke advocacy. The need for follow-up after discharge was highlighted with patient/family with them being able to repeat understanding of the importance of this.       Electronically signed by Shakeel Ramos RN on 4/1/2020 at 10:44 AM

## 2020-04-01 NOTE — PROGRESS NOTES
but declining majority of additional PO    Objective:     Pain   Patient Currently in Pain: Denies    Cognitive/Behavior    alert, requires cueing, able to respond to simple questions re: wants/needs, intermittent confusion but oriented to month, KADEN, location, and recalled having MRI yesterday. Presentations   Consistencies Administered: Dysphagia Soft and Bite-Sized (Dysphagia III), Dysphagia Pureed (Dysphagia I), Honey - teaspoon, Honey - cup    Positioning    upright in bed; able to maintain midline throughout PO trials    PO Trials:  · Thin Liquids NA 2/2 s/s with NTL  · Nectar thick liquids via tspx3 and cup x1: decreased lingual coordination for bolus formation and AP transit; decreased laryngeal elevation; delayed swallow initiation; no overt s/s via tsp, but slight VQ change and delayed cough via cup   · Honey Thick liquids via tsp x2 and cup x5: decreased lingual coordination for bolus formation and AP transit; improved oral transit; decreased laryngeal elevation; delayed swallow initiation; no immediate or delayed cough, throat clear or VQ change  · Puree: decreased lingual coordination for bolus formation and AP transit; improved oral transit; decreased laryngeal elevation; delayed swallow initiation; no immediate or delayed overt s/s   · Soft food NA  · Regular food NA    Dysphagia Tx:   · Asleep but aroused easily; follows 1 step dx for basic OM commands; able to self-administer liquid via cup without assist  · Labial and lingual ROM symmetrical and functional; decreased lingual elevation, lateralization and agility/coordination noted. · PO trials: tolerating puree texture and moderately honey thick liquid via cup with no immediate or delayed overt s/s;  mildly nectar thick via cup resulted in VQ change and delayed cough   · Pt with emerging ability to self-feed; requires consistent cues/reinforcement for execution of small sips and monitoring vocal quality change.    · SLP educated nsg on recommendation for upgrade to moderately honey thick liquids and recommendation for MBS. Goals:   Dysphagia Goals: The patient will tolerate recommended diet without observed clinical signs of aspiration continue  The patient will tolerate honey-thick liquids without signs and symptoms of aspiration 10/10 via cup. Continue; recommend upgrade to honey thick  NEW GOAL: Pt will participate in instrumental swallowing assessment if ordered by MD  Pt will tolerate nectar thick liquids without s/s 10/10 via cup  Pt will complete OM, JOSHUA/PEX to improve swallow function 5/5     Assessment:   Impressions:   Dysphagia Diagnosis: Moderate oral stage dysphagia, Moderate to severe pharyngeal stage dysphagia   · decreased lingual coordination for bolus formation, control and AP transit; variably improved oral transit time; delayed swallow initiation; decreased laryngeal elevation  · Improved toleration of moderately honey thick liquids via cup without overt s/s   · Concern for aspiration/penetration of nectar liquid evidenced by variable VQ change and delayed cough   · MBS is recommended to determine severity of pharyngeal impairment and assist in determining least restrictive diet/liquid level. Pt with acute CVA on MRI. Please order MBS if in agreement.      Diet Recommendations:  Puree diet texture  Recommend upgrade to moderately honey thick liquids       Recommended Form of Meds: Crushed in puree as able    Strategies:   Compensatory Swallowing Strategies: Small bites/sips, assist feed, Eat/Feed slowly, Remain upright for 30-45 minutes after meals, Upright as possible for all oral intake    Education:  Consulted and agree with results and recommendations: RN  Patient Education: Review tih pt with no retention  Patient Education Response: Needs reinforcement    Prognosis:   Fair    Plan:     Continue Dysphagia Therapy: yes  Interventions: Therapeutic Interventions: Therapeutic PO trials with SLP, Patient/Family education, Oral care, Oral motor exercises  Duration/Frequency of therapy while on unit: Duration/Frequency of Treatment  Duration/Frequency of Treatment: 3-5x/week while on acute  Discharge Instructions:   Anticipate Yes for further skilled Speech Therapy for Dysphagia at discharge    This note serves as a D/C Summary in the event that this patient is discharged prior to the next therapy session.     Coded treatment time:15  Total treatment time: 36    Jennyfer Bender MS, CCC-SLP #[id number]  Speech Language Pathologist   4/1/2020 at 405PM no

## 2020-04-01 NOTE — PROGRESS NOTES
% injection 10 mL, 2 times per day  sodium chloride flush 0.9 % injection 10 mL, PRN  acetaminophen (TYLENOL) tablet 650 mg, Q6H PRN    Or  acetaminophen (TYLENOL) suppository 650 mg, Q6H PRN  polyethylene glycol (GLYCOLAX) packet 17 g, Daily PRN  promethazine (PHENERGAN) tablet 12.5 mg, Q6H PRN    Or  ondansetron (ZOFRAN) injection 4 mg, Q6H PRN        Objective:  BP 91/63   Pulse 70   Temp 98.3 °F (36.8 °C)   Resp 16   Ht 5' 1\" (1.549 m)   Wt 155 lb 13.8 oz (70.7 kg)   SpO2 95%   BMI 29.45 kg/m²     Intake/Output Summary (Last 24 hours) at 4/1/2020 1708  Last data filed at 4/1/2020 0935  Gross per 24 hour   Intake 1232 ml   Output 400 ml   Net 832 ml      Wt Readings from Last 3 Encounters:   04/01/20 155 lb 13.8 oz (70.7 kg)   10/03/17 127 lb 6.8 oz (57.8 kg)   12/07/16 150 lb (68 kg)       General appearance:  Appears comfortable, sitting in chair  Eyes: Sclera clear. Pupils equal.  ENT: Moist oral mucosa. Trachea midline, no adenopathy. Cardiovascular: Regular rhythm, normal S1, S2. No murmur. No edema in lower extremities  Respiratory: BL rhonchi. No wheezes or rales. GI: Abdomen soft, no tenderness, not distended, normal bowel sounds  Musculoskeletal: LUE contracture  Neurology: L sided weakness. No speech or motor deficits  Psych: Normal affect. Alert and oriented in time, place and person  Skin: Warm, dry, normal turgor  Extremity exam shows brisk capillary refill. Peripheral pulses are palpable in lower extremities     Labs and Tests:  CBC:   Recent Labs     03/30/20  0628 03/31/20  0505 04/01/20  0505   WBC 12.0* 12.2* 11.7*   HGB 12.5 13.2 11.6*    341 301     BMP:    Recent Labs     03/30/20  0628 03/31/20  0505 04/01/20  0505   * 132* 135*   K 4.4 3.9 3.4*   CL 99 96* 99   CO2 18* 19* 24   BUN 11 9 14   CREATININE <0.5* <0.5* 0.7   GLUCOSE 96 96 120*     Hepatic: No results for input(s): AST, ALT, ALB, BILITOT, ALKPHOS in the last 72 hours.   MRA HEAD WO CONTRAST   Final Result No large vessel occlusion. MRI BRAIN WO CONTRAST   Final Result   Acute infarct within the left occipital lobe with possible petechial   hemorrhage. Additional surrounding acute microinfarcts. The findings were sent to the Radiology Results [address] at 3:16   pm on 3/31/2020to be communicated to a licensed caregiver. XR CHEST STANDARD (2 VW)   Final Result   Probable small left-sided pleural effusion and mild left basilar airspace   disease, atelectasis versus pneumonia. CT CERVICAL SPINE WO CONTRAST   Final Result   No acute abnormality of the cervical spine. CT CHEST WO CONTRAST   Final Result   No acute intrathoracic abnormality. CT Head WO Contrast   Final Result   No acute intracranial abnormality. Large remote right MCA distribution infarct, similar in appearance. Atrophy and white matter changes are again seen consistent chronic small   vessel ischemic disease. XR CHEST PORTABLE   Final Result   No acute cardiopulmonary disease. Problem List  Principal Problem:    Acute metabolic encephalopathy  Active Problems:    Irritable bowel syndrome    Peripheral vascular disease ([address]    Cerebrovascular accident (CVA) due to occlusion of cerebral artery (HCC)    Essential hypertension    Mixed hyperlipidemia    Hyponatremia    AMS (altered mental status)    UTI (urinary tract infection)    High anion gap metabolic acidosis    Leukocytosis    Atrial fibrillation with rapid ventricular response (HCC)    Dementia (HCC)    Dysphagia    Aspiration pneumonia (HCC)    Acute ischemic stroke ([address]  Resolved Problems:    * No resolved hospital problems. *       Assessment & Plan:   1. Start Xarelto for Afib with acute ischemic stroke  2. Cont Cardizem 30 mg PO q6h for Afib, currently in NSR  3. Await PMR consult for ARU eval  4. Cont IV Zosyn for aspiration PNA  5. Appreciate Neuro input  6.  Diet per SLP    IV Access:

## 2020-04-02 ENCOUNTER — APPOINTMENT (OUTPATIENT)
Dept: GENERAL RADIOLOGY | Age: 85
DRG: 064 | End: 2020-04-02
Payer: MEDICARE

## 2020-04-02 ENCOUNTER — HOSPITAL ENCOUNTER (INPATIENT)
Age: 85
LOS: 22 days | Discharge: NURSING, REHAB OR LONG-TERM CARE | DRG: 056 | End: 2020-04-24
Attending: PHYSICAL MEDICINE & REHABILITATION | Admitting: PHYSICAL MEDICINE & REHABILITATION
Payer: MEDICARE

## 2020-04-02 VITALS
HEART RATE: 76 BPM | WEIGHT: 154.32 LBS | OXYGEN SATURATION: 97 % | TEMPERATURE: 97.9 F | RESPIRATION RATE: 17 BRPM | BODY MASS INDEX: 29.14 KG/M2 | DIASTOLIC BLOOD PRESSURE: 78 MMHG | SYSTOLIC BLOOD PRESSURE: 172 MMHG | HEIGHT: 61 IN

## 2020-04-02 LAB
ALBUMIN SERPL-MCNC: 3.3 G/DL (ref 3.4–5)
ANION GAP SERPL CALCULATED.3IONS-SCNC: 11 MMOL/L (ref 3–16)
BUN BLDV-MCNC: 11 MG/DL (ref 7–20)
CALCIUM SERPL-MCNC: 8.7 MG/DL (ref 8.3–10.6)
CHLORIDE BLD-SCNC: 101 MMOL/L (ref 99–110)
CHOLESTEROL, TOTAL: 104 MG/DL (ref 0–199)
CO2: 25 MMOL/L (ref 21–32)
CREAT SERPL-MCNC: 0.6 MG/DL (ref 0.6–1.2)
GFR AFRICAN AMERICAN: >60
GFR NON-AFRICAN AMERICAN: >60
GLUCOSE BLD-MCNC: 112 MG/DL (ref 70–99)
HCT VFR BLD CALC: 34.7 % (ref 36–48)
HDLC SERPL-MCNC: 63 MG/DL (ref 40–60)
HEMOGLOBIN: 11.6 G/DL (ref 12–16)
LDL CHOLESTEROL CALCULATED: 29 MG/DL
MAGNESIUM: 2.1 MG/DL (ref 1.8–2.4)
MCH RBC QN AUTO: 32 PG (ref 26–34)
MCHC RBC AUTO-ENTMCNC: 33.4 G/DL (ref 31–36)
MCV RBC AUTO: 95.8 FL (ref 80–100)
PDW BLD-RTO: 14.3 % (ref 12.4–15.4)
PHOSPHORUS: 2.6 MG/DL (ref 2.5–4.9)
PLATELET # BLD: 281 K/UL (ref 135–450)
PMV BLD AUTO: 8.2 FL (ref 5–10.5)
POTASSIUM SERPL-SCNC: 3.5 MMOL/L (ref 3.5–5.1)
RBC # BLD: 3.62 M/UL (ref 4–5.2)
SODIUM BLD-SCNC: 137 MMOL/L (ref 136–145)
TRIGL SERPL-MCNC: 59 MG/DL (ref 0–150)
VLDLC SERPL CALC-MCNC: 12 MG/DL
WBC # BLD: 9.6 K/UL (ref 4–11)

## 2020-04-02 PROCEDURE — 92611 MOTION FLUOROSCOPY/SWALLOW: CPT

## 2020-04-02 PROCEDURE — 80061 LIPID PANEL: CPT

## 2020-04-02 PROCEDURE — 6370000000 HC RX 637 (ALT 250 FOR IP): Performed by: PHYSICAL MEDICINE & REHABILITATION

## 2020-04-02 PROCEDURE — 97530 THERAPEUTIC ACTIVITIES: CPT | Performed by: PHYSICAL THERAPIST

## 2020-04-02 PROCEDURE — 36415 COLL VENOUS BLD VENIPUNCTURE: CPT

## 2020-04-02 PROCEDURE — 83735 ASSAY OF MAGNESIUM: CPT

## 2020-04-02 PROCEDURE — 1280000000 HC REHAB R&B

## 2020-04-02 PROCEDURE — 80069 RENAL FUNCTION PANEL: CPT

## 2020-04-02 PROCEDURE — 2580000003 HC RX 258: Performed by: INTERNAL MEDICINE

## 2020-04-02 PROCEDURE — 2580000003 HC RX 258: Performed by: HOSPITALIST

## 2020-04-02 PROCEDURE — 97530 THERAPEUTIC ACTIVITIES: CPT

## 2020-04-02 PROCEDURE — 6360000002 HC RX W HCPCS: Performed by: INTERNAL MEDICINE

## 2020-04-02 PROCEDURE — 85027 COMPLETE CBC AUTOMATED: CPT

## 2020-04-02 PROCEDURE — 6370000000 HC RX 637 (ALT 250 FOR IP): Performed by: INTERNAL MEDICINE

## 2020-04-02 PROCEDURE — 94760 N-INVAS EAR/PLS OXIMETRY 1: CPT

## 2020-04-02 PROCEDURE — 97535 SELF CARE MNGMENT TRAINING: CPT

## 2020-04-02 PROCEDURE — 74230 X-RAY XM SWLNG FUNCJ C+: CPT

## 2020-04-02 PROCEDURE — 92526 ORAL FUNCTION THERAPY: CPT

## 2020-04-02 RX ORDER — LEVOFLOXACIN 500 MG/1
500 TABLET, FILM COATED ORAL DAILY
Status: CANCELLED | OUTPATIENT
Start: 2020-04-03

## 2020-04-02 RX ORDER — DILTIAZEM HYDROCHLORIDE 120 MG/1
120 CAPSULE, COATED, EXTENDED RELEASE ORAL DAILY
Qty: 30 CAPSULE | Refills: 0
Start: 2020-04-02

## 2020-04-02 RX ORDER — ACETAMINOPHEN 325 MG/1
650 TABLET ORAL EVERY 4 HOURS PRN
Status: CANCELLED | OUTPATIENT
Start: 2020-04-02

## 2020-04-02 RX ORDER — LEVOFLOXACIN 500 MG/1
500 TABLET, FILM COATED ORAL DAILY
Status: DISCONTINUED | OUTPATIENT
Start: 2020-04-03 | End: 2020-04-07

## 2020-04-02 RX ORDER — LOSARTAN POTASSIUM 100 MG/1
100 TABLET ORAL DAILY
Status: DISCONTINUED | OUTPATIENT
Start: 2020-04-03 | End: 2020-04-24 | Stop reason: HOSPADM

## 2020-04-02 RX ORDER — ATORVASTATIN CALCIUM 40 MG/1
40 TABLET, FILM COATED ORAL NIGHTLY
Qty: 30 TABLET | Refills: 0
Start: 2020-04-02

## 2020-04-02 RX ORDER — ACETAMINOPHEN 325 MG/1
650 TABLET ORAL EVERY 4 HOURS PRN
Status: DISCONTINUED | OUTPATIENT
Start: 2020-04-02 | End: 2020-04-24 | Stop reason: HOSPADM

## 2020-04-02 RX ORDER — ASPIRIN 81 MG/1
81 TABLET, CHEWABLE ORAL DAILY
Qty: 30 TABLET | Refills: 0
Start: 2020-04-02

## 2020-04-02 RX ORDER — SODIUM CHLORIDE 0.9 % (FLUSH) 0.9 %
10 SYRINGE (ML) INJECTION PRN
Status: CANCELLED | OUTPATIENT
Start: 2020-04-02

## 2020-04-02 RX ORDER — DILTIAZEM HYDROCHLORIDE 60 MG/1
30 TABLET, FILM COATED ORAL EVERY 6 HOURS SCHEDULED
Status: DISCONTINUED | OUTPATIENT
Start: 2020-04-02 | End: 2020-04-24 | Stop reason: HOSPADM

## 2020-04-02 RX ORDER — SODIUM CHLORIDE 0.9 % (FLUSH) 0.9 %
10 SYRINGE (ML) INJECTION PRN
Status: DISCONTINUED | OUTPATIENT
Start: 2020-04-02 | End: 2020-04-24 | Stop reason: HOSPADM

## 2020-04-02 RX ORDER — ATORVASTATIN CALCIUM 40 MG/1
40 TABLET, FILM COATED ORAL NIGHTLY
Status: DISCONTINUED | OUTPATIENT
Start: 2020-04-02 | End: 2020-04-24 | Stop reason: HOSPADM

## 2020-04-02 RX ORDER — PANTOPRAZOLE SODIUM 40 MG/1
40 TABLET, DELAYED RELEASE ORAL
Status: DISCONTINUED | OUTPATIENT
Start: 2020-04-03 | End: 2020-04-04

## 2020-04-02 RX ORDER — SODIUM CHLORIDE 0.9 % (FLUSH) 0.9 %
10 SYRINGE (ML) INJECTION EVERY 12 HOURS SCHEDULED
Status: DISCONTINUED | OUTPATIENT
Start: 2020-04-02 | End: 2020-04-20

## 2020-04-02 RX ORDER — SODIUM CHLORIDE 0.9 % (FLUSH) 0.9 %
10 SYRINGE (ML) INJECTION EVERY 12 HOURS SCHEDULED
Status: CANCELLED | OUTPATIENT
Start: 2020-04-02

## 2020-04-02 RX ORDER — POLYETHYLENE GLYCOL 3350 17 G/17G
17 POWDER, FOR SOLUTION ORAL DAILY PRN
Status: CANCELLED | OUTPATIENT
Start: 2020-04-02

## 2020-04-02 RX ORDER — PANTOPRAZOLE SODIUM 40 MG/1
40 TABLET, DELAYED RELEASE ORAL
Status: CANCELLED | OUTPATIENT
Start: 2020-04-03

## 2020-04-02 RX ORDER — LOSARTAN POTASSIUM 100 MG/1
100 TABLET ORAL DAILY
Status: CANCELLED | OUTPATIENT
Start: 2020-04-03

## 2020-04-02 RX ORDER — ATORVASTATIN CALCIUM 40 MG/1
40 TABLET, FILM COATED ORAL NIGHTLY
Status: CANCELLED | OUTPATIENT
Start: 2020-04-02

## 2020-04-02 RX ORDER — ASPIRIN 81 MG/1
162 TABLET, CHEWABLE ORAL DAILY
Qty: 30 TABLET | Refills: 0
Start: 2020-04-02 | End: 2020-04-02

## 2020-04-02 RX ORDER — POLYETHYLENE GLYCOL 3350 17 G/17G
17 POWDER, FOR SOLUTION ORAL DAILY PRN
Status: DISCONTINUED | OUTPATIENT
Start: 2020-04-02 | End: 2020-04-24 | Stop reason: HOSPADM

## 2020-04-02 RX ORDER — ASPIRIN 81 MG/1
81 TABLET, CHEWABLE ORAL DAILY
Status: DISCONTINUED | OUTPATIENT
Start: 2020-04-02 | End: 2020-04-02 | Stop reason: HOSPADM

## 2020-04-02 RX ORDER — AMOXICILLIN AND CLAVULANATE POTASSIUM 875; 125 MG/1; MG/1
1 TABLET, FILM COATED ORAL 2 TIMES DAILY
Qty: 12 TABLET | Refills: 0 | Status: ON HOLD
Start: 2020-04-02 | End: 2020-04-24 | Stop reason: HOSPADM

## 2020-04-02 RX ADMIN — DILTIAZEM HYDROCHLORIDE 30 MG: 30 TABLET, FILM COATED ORAL at 06:28

## 2020-04-02 RX ADMIN — PIPERACILLIN AND TAZOBACTAM 3.38 G: 3; .375 INJECTION, POWDER, FOR SOLUTION INTRAVENOUS at 06:27

## 2020-04-02 RX ADMIN — DESMOPRESSIN ACETATE 40 MG: 0.2 TABLET ORAL at 20:52

## 2020-04-02 RX ADMIN — DILTIAZEM HYDROCHLORIDE 30 MG: 60 TABLET, FILM COATED ORAL at 18:40

## 2020-04-02 RX ADMIN — DILTIAZEM HYDROCHLORIDE 30 MG: 30 TABLET, FILM COATED ORAL at 11:36

## 2020-04-02 RX ADMIN — Medication 10 ML: at 08:17

## 2020-04-02 RX ADMIN — ASPIRIN 81 MG 81 MG: 81 TABLET ORAL at 13:18

## 2020-04-02 RX ADMIN — LOSARTAN POTASSIUM 100 MG: 100 TABLET, FILM COATED ORAL at 08:17

## 2020-04-02 RX ADMIN — SODIUM CHLORIDE, PRESERVATIVE FREE 10 ML: 5 INJECTION INTRAVENOUS at 08:17

## 2020-04-02 RX ADMIN — POTASSIUM BICARBONATE 40 MEQ: 782 TABLET, EFFERVESCENT ORAL at 08:17

## 2020-04-02 RX ADMIN — PANTOPRAZOLE SODIUM 40 MG: 40 TABLET, DELAYED RELEASE ORAL at 06:28

## 2020-04-02 ASSESSMENT — PAIN SCALES - GENERAL
PAINLEVEL_OUTOF10: 0

## 2020-04-02 ASSESSMENT — PAIN SCALES - WONG BAKER: WONGBAKER_NUMERICALRESPONSE: 0

## 2020-04-02 NOTE — PROGRESS NOTES
Patient admitted to room 4905 via transport. Transferred to bed via slide board with two person max assist. Oriented to room, call light, phone, TV, thermostat, bed controls, bathroom, emergency cord, white board, therapy times, unit routine, visiting hours,  and meal times. Patient verbalized understanding. States bowel routine is daily. Call light and personal items in reach, alarms active and in place. Vital signs stable.

## 2020-04-02 NOTE — PROGRESS NOTES
remains candidate for ARU. Has been accepted to [facility].      Thank you for this consult. Please contact me with any questions or concerns. [address].  Aleksandra Palmer MD 4/2/2020, 12:12 PM

## 2020-04-02 NOTE — PROGRESS NOTES
3083    sodium chloride flush 0.9 % injection 10 mL, 10 mL, Intravenous, PRN, Catracho Morelos MD    losartan (COZAAR) tablet 100 mg, 100 mg, Oral, Daily, Alexandra Villegas MD, 100 mg at 04/02/20 0817    pantoprazole (PROTONIX) tablet 40 mg, 40 mg, Oral, QAM AC, Alexandra Villegas MD, 40 mg at 04/02/20 5715    sodium chloride flush 0.9 % injection 10 mL, 10 mL, Intravenous, 2 times per day, Alexandra Villegas MD, 10 mL at 04/02/20 0817    sodium chloride flush 0.9 % injection 10 mL, 10 mL, Intravenous, PRN, Alexandra Villegas MD    acetaminophen (TYLENOL) tablet 650 mg, 650 mg, Oral, Q6H PRN **OR** acetaminophen (TYLENOL) suppository 650 mg, 650 mg, Rectal, Q6H PRN, Alexandra Villegas MD    polyethylene glycol [facility]) packet 17 g, 17 g, Oral, Daily PRN, Alexandra Villegas MD    promethazine (PHENERGAN) tablet 12.5 mg, 12.5 mg, Oral, Q6H PRN **OR** ondansetron (ZOFRAN) injection 4 mg, 4 mg, Intravenous, Q6H PRN, Alexandra Villegas MD    Exam  Blood pressure (!) 145/77, pulse 77, temperature 98 °F (36.7 °C), temperature source Oral, resp. rate 17, height 5' 1\" (1.549 m), weight 154 lb 5.2 oz (70 kg), SpO2 92 %. Constitutional    Vital signs: BP, HR, and RR reviewed   General Alert, no distress, well-nourished  Cardiovascular: pulses symmetric in all 4 extremities. No peripheral edema. Psychiatric: cooperative with examination, no  psychotic behavior noted. Neurologic  Mental status:   orientation to person, place, time and situation. Attention intact as able to attend well to the exam     Language fluent in conversation   Comprehension intact; follows simple commands  Cranial nerves:   CN2: Visual Fields full w/o extinction on confrontational testing,   CN 3,4,6: extraocular muscles intact,  CN7:Mild left facial droop, mild dysarthria. CN8: hearing grossly intact  CN12: tongue midline with protrusion  Strength: LUE: deferred r/t contracture. RUE: moderate grasp; 4/5. LLE:3/5.  RLE: 3/5  Sensory: light touch intact in all 4 conversion due to size of infarct and associated petechial hemorrhage. 2. OK to resume 81mg aspirin. 3. Continue statin. 4. Normotension, normoglycemia. 5. Rehabilitation efforts. Please call back with any addition questions or concerns.      Jaqui Garcia CNP  [address] [place] [address] [address] Neurology    A copy of this note was provided for Dr Maria E Washington MD

## 2020-04-02 NOTE — DISCHARGE SUMMARY
Hospital Medicine Discharge Summary    Patient: Sarabjit Penaloza     Gender: female  : 11/3/1933   Age: 80 y.o. MRN: [id number]    Admitting Physician: Pérez Castle MD  Discharge Physician: Casie Germain MD     Code Status: DNR-CCA     Admit Date: 3/26/2020   Discharge Date:   20    Disposition:  Acute rehab     Discharge Diagnoses: Active Hospital Problems    Diagnosis Date Noted    Acute metabolic encephalopathy [I57.64] 2020    UTI (urinary tract infection) [N39.0] 2020    High anion gap metabolic acidosis [R62.6] 2020    Leukocytosis [D72.829] 2020    Atrial fibrillation with rapid ventricular response ([address] [I48.91] 2020    Dementia ([address] [F03.90] 2020    Dysphagia [R13.10] 2020    Aspiration pneumonia ([address] [J69.0] 2020    Acute ischemic stroke ([address] [I63.9] 2020    AMS (altered mental status) [R41.82] 2020    Hyponatremia [E87.1] 10/01/2017    Essential hypertension [I10] 2015    Mixed hyperlipidemia [E78.2] 2015    Peripheral vascular disease ([address] [I73.9] 2011    Cerebrovascular accident (CVA) due to occlusion of cerebral artery ([address] [I63.50] 2011    Irritable bowel syndrome [K58.9] 2011       Follow-up appointments:  one week    Outpatient to do list: F/U with PCP and Neurology. Discuss with Dr Rainer Garcia (Neuro) on 20 and 20 about transitioning from ASA 81 mg daily to anticoagulation    Condition at Discharge:  Stable    Hospital Course:   79 yo F with history of stroke with L sided weakness, HTN, hyperlipidemia, PVD, dementia came to ER with AMS. Patient admitted as inpatient for UTI with failure of outpatient treatment with acute metabolic encephalopathy and hyponatremia. Started on IVF and IV Abx. UCx negative as she had Abx prior to admission, completed course of IV Abx in hospital.  Does not have baseline dysphagia, now on Puree diet per SLP.   Developed Afib with RVR on 3/31, started on Cardizem gtt and converted to NSR. MRI Brain with acute L occipital infarct with petechial bleed on 3/31. Started on Zosyn for aspiration PNA on 3/31. Neurology consulted. PMR consulted. Started on Xarelto for Afib with acute ischemic stroke on 4/1 after long discussion with daughter Carlos Carson. Will discharge to [address] [place] [address]. Neurology decided on 4/2 that patient will be on ASA 81 mg daily for 7-14 days and then they will decide when to switch to NOAC (anticoagulation). Will finish course of PO Augmentin for aspiration PNA. Is on modified diet. Will be on Cardizem  mg PO daily for Afib, currently in NSR. Discharge Medications:   Current Discharge Medication List      START taking these medications    Details   atorvastatin (LIPITOR) 40 MG tablet Take 1 tablet by mouth nightly  Qty: 30 tablet, Refills: 0      amoxicillin-clavulanate (AUGMENTIN) 875-125 MG per tablet Take 1 tablet by mouth 2 times daily for 6 days  Qty: 12 tablet, Refills: 0      dilTIAZem (CARDIZEM CD) 120 MG extended release capsule Take 1 capsule by mouth daily  Qty: 30 capsule, Refills: 0      aspirin 81 MG chewable tablet Take 1 tablet by mouth daily  Qty: 30 tablet, Refills: 0           Current Discharge Medication List        Current Discharge Medication List      CONTINUE these medications which have NOT CHANGED    Details   omeprazole (PRILOSEC) 20 MG delayed release capsule TAKE ONE CAPSULE BY MOUTH DAILY  Qty: 90 capsule, Refills: 0    Associated Diagnoses: Gastroesophageal reflux disease without esophagitis      losartan (COZAAR) 100 MG tablet TAKE ONE TABLET BY MOUTH DAILY  Qty: 90 tablet, Refills: 1    Associated Diagnoses: Spastic hemiplegia of left dominant side as late effect of cerebral infarction ([facility]  Essential hypertension           Current Discharge Medication List      STOP taking these medications       ciprofloxacin (CIPRO) 250 MG tablet Comments:   Reason for Stopping: Reason for exam:->Altered mental status Reason for Exam: Altered mental status Acuity: Acute Type of Exam: Initial FINDINGS: The patient is rotated. The cardiac silhouette is normal.  Tracheobronchial and thoracic aortic calcification is present. There is scattered old granulomatous disease. No focal airspace disease or pleural effusion is seen. No acute cardiopulmonary disease. Mri Brain Wo Contrast    Result Date: 3/31/2020  EXAMINATION: MRI OF THE BRAIN WITHOUT CONTRAST  3/31/2020 2:06 pm TECHNIQUE: Multiplanar multisequence MRI of the brain was performed without the administration of intravenous contrast. COMPARISON: None. HISTORY: ORDERING SYSTEM PROVIDED HISTORY: AMS TECHNOLOGIST PROVIDED HISTORY: Reason for exam:->AMS Reason for Exam: AMS Acuity: Acute Type of Exam: Subsequent/Follow-up Additional signs and symptoms: Change in mental status. Has recently been aggressive towards staff. Dementia/confusion worsening per family. FINDINGS: INTRACRANIAL STRUCTURES/VENTRICLES: There is an acute infarct within the left occipital lobe measuring up to 4.9 cm in diameter, with additional surrounding microinfarcts. There is local mass effect characterized by sulcal effacement and a minimal amount of susceptibility artifact is identified within the larger area of infarction which may reflect petechial hemorrhage. Chronic white matter microvascular ischemic changes are present and there are remote infarcts within the right cerebral hemisphere. Normal expected signal voids are seen within the vessels at the base of skull. No shift is identified. ORBITS: The visualized portion of the orbits demonstrate no acute abnormality. SINUSES: The visualized paranasal sinuses and mastoid air cells are well aerated. BONES/SOFT TISSUES: The bone marrow signal intensity appears normal. The soft tissues demonstrate no acute abnormality. Acute infarct within the left occipital lobe with possible petechial hemorrhage. Additional surrounding acute microinfarcts. The findings were sent to the Radiology Results [address] at 3:16 pm on 3/31/2020to be communicated to a licensed caregiver. The patient was seen and examined on day of discharge and this discharge summary is in conjunction with any daily progress note from day of discharge. Time Spent on discharge is 45 minutes  in the examination, evaluation, counseling and review of medications and discharge plan. Note that more than 30 minutes was spent in preparing discharge papers, discussing discharge with patient, medication review, etc.       Signed:    Charlette Carey MD   4/2/2020      Thank you Mulugeta Penaloza DO for the opportunity to be involved in this patient's care.  If you have any questions or concerns please feel free to contact me at 0 [facility]

## 2020-04-02 NOTE — PLAN OF CARE
re-ed                            [x]  transfer training             [x] community reintegration    [x] bed mobility                          [x]  w/c mobility and training  [x]  self care    [x]home mgmt    [x]  cognitive training            [x]  energy conservation        [x]  dysphagia tx    [x]  speech/language/communication therapy   [x]  group therapy    [x]  patient/family education    [] Other:    CASE MANAGEMENT:  Goals:   Assist patient/family with discharge planning, patient/family counseling,   and coordination with insurance during ARU stay. Ramy Hamilton will be seen a minimum of 3 hours of therapy per day, a minimum of 5 out of 7 days per week  (please see above for specific treatment plan per PT/OT/SLP). [] In this rare instance due to the nature of this patient's medical involvement, this patient will be seen 15 hours per week (900 minutes within a 7 day period). In addition, dietician/nutritionist may monitor calorie count as well as intake and collaboratively work with SLP on dietary upgrades. Neuropsychology/Psychology may evaluate and provide necessary support. Medical issues being managed closely and that require 24 hour availability of a physician:  [x] Swallowing Precautions  [x] Bowel/Bladder Fx  [] Weight bearing precautions  [] Wound Care    [x] Pain Mgmt   [] Infection Protection  [x] DVT Prophylaxis   [x] Fall Precautions  [x] Fluid/Electrolyte/Nutrition Balance  [x] Voice Protection   [x] Respiratory  [] Other:    Medical Prognosis: [] Good  [x] Fair    [] Guarded   Total expected IRF days 21 days  Anticipated discharge destination: Home  [] Home Independently   [] Home with supervision    []SNF     [x] Other                                           Physician anticipated functional outcomes:   Will regain function to a partial assistance level for most activities, may require additional assistance at times   IPOC brief synthesis: 79 yo F with pmh HTN, HLD, PVD, and prior R MCA stroke (2004 with residual left hemiparesis and cognitive impairment) who initially presented 3/26/2020 with altered mental status. Per family, patient had become more confused over the course of a few days. On the morning of admission she had a fall while trying to get into her wheelchair and hit the side of her head. CT head was negative for any acute abnormalities. Infectious work-up was negative but treated for UTI as she had been receiving antibiotics as outpatient. Course complicated by Afib with RVR and progressive confusion/encephalopathy. MRI brain then obtained and revealed acute ischemic left PCA stroke. Neurology recommending anticoagulation to begin on 4/72. CPKKSG also complicated by aspiration pneumonia and new onset dysphagia. She is a poor historian and is unable to provide details regarding her admission. She reports baseline left side weakness and tightness (affecting arm >leg). She denies headache, vision change, tingling/numbness, or difficulty with speech. She was admitted with the above goal.      I have reviewed this initial plan of care and agree with its contents:    Title   Name    Date    Time    Physician: Electronically signed by Dickson Loera MD on 4/3/2020 at 3:59 PM      Case Mgmt: FRANCES Drum[place], [place], 4/3/2020, [address]    OT:  Nii Harding OTR/L, [facility] #[id number], 4/3/2020, 4/3/2020    PT:  VERONICA RichT [id number] 4/3/2020 1049    RN: Leegemma Valenzuela RN 4/2/2020 1721    ST: Derick Rae MA CCC-SLP 4/3/2020  1553    : Vamshi Del Valle, [address] [place] [address], 4/3/2020 1231    Other:

## 2020-04-02 NOTE — PLAN OF CARE
Problem: Falls - Risk of:  Goal: Will remain free from falls  Description: Will remain free from falls  4/1/2020 2209 by Myrna Heller RN  Outcome: Ongoing  4/1/2020 1043 by Tommy Carrasquillo RN  Outcome: Ongoing  4/1/2020 1042 by Tommy Carrasquillo RN  Outcome: Ongoing  Goal: Absence of physical injury  Description: Absence of physical injury  4/1/2020 2209 by Myrna Heller RN  Outcome: Ongoing  4/1/2020 1043 by Tommy Carrasquillo RN  Outcome: Ongoing  4/1/2020 1042 by Tommy Carrasquillo RN  Outcome: Ongoing     Problem: Confusion - Acute:  Goal: Absence of continued neurological deterioration signs and symptoms  Description: Absence of continued neurological deterioration signs and symptoms  4/1/2020 2209 by Myrna Heller RN  Outcome: Ongoing  4/1/2020 1043 by Tommy Carrasquillo RN  Outcome: Ongoing  4/1/2020 1042 by Tommy Carrasquillo RN  Outcome: Ongoing  Goal: Mental status will be restored to baseline  Description: Mental status will be restored to baseline  4/1/2020 2209 by Myrna Heller RN  Outcome: Ongoing  4/1/2020 1043 by Tommy Carrasquillo RN  Outcome: Ongoing  4/1/2020 1042 by Tommy Carrasquillo RN  Outcome: Ongoing     Problem: Discharge Planning:  Goal: Ability to perform activities of daily living will improve  Description: Ability to perform activities of daily living will improve  4/1/2020 2209 by Myrna Heller RN  Outcome: Ongoing  4/1/2020 1043 by Tommy Carrasquillo RN  Outcome: Ongoing  4/1/2020 1042 by Tommy Carrasquillo RN  Outcome: Ongoing  Goal: Participates in care planning  Description: Participates in care planning  4/1/2020 2209 by Myrna Heller RN  Outcome: Ongoing  4/1/2020 1043 by Tommy Carrasquillo RN  Outcome: Ongoing  4/1/2020 1042 by Tommy Carrasquillo RN  Outcome: Ongoing     Problem: Injury - Risk of, Physical Injury:  Goal: Will remain free from falls  Description: Will remain free from falls  4/1/2020 2209 by Myrna Heller RN  Outcome: Ongoing  4/1/2020 1043 by Tommy Carrasquillo RN  Outcome: by Marisa Bonilla RN  Outcome: Ongoing  4/1/2020 1043 by Shade Malik RN  Outcome: Ongoing  4/1/2020 1042 by Shade Malik RN  Outcome: Ongoing  Goal: Able to refrain from responding to false sensory perceptions  Description: Able to refrain from responding to false sensory perceptions  4/1/2020 2209 by Marisa Bonilla RN  Outcome: Ongoing  4/1/2020 1043 by Shade Malik RN  Outcome: Ongoing  4/1/2020 1042 by Shade Malik RN  Outcome: Ongoing  Goal: Demonstrates accurate environmental perceptions  Description: Demonstrates accurate environmental perceptions  4/1/2020 2209 by Marisa Bonilla RN  Outcome: Ongoing  4/1/2020 1043 by Shade Malik RN  Outcome: Ongoing  4/1/2020 1042 by Shade Malik RN  Outcome: Ongoing  Goal: Able to distinguish between reality-based and nonreality-based thinking  Description: Able to distinguish between reality-based and nonreality-based thinking  4/1/2020 2209 by Marisa Bonilla RN  Outcome: Ongoing  4/1/2020 1043 by Shade Malik RN  Outcome: Ongoing  4/1/2020 1042 by Shade Malik RN  Outcome: Ongoing  Goal: Able to interrupt nonreality-based thinking  Description: Able to interrupt nonreality-based thinking  4/1/2020 2209 by Marisa Bonilla RN  Outcome: Ongoing  4/1/2020 1043 by Shade Malik RN  Outcome: Ongoing  4/1/2020 1042 by Shade Malik RN  Outcome: Ongoing     Problem: Sleep Pattern Disturbance:  Goal: Appears well-rested  Description: Appears well-rested  4/1/2020 2209 by Marisa Bonilla RN  Outcome: Ongoing  4/1/2020 1043 by Shade Malik RN  Outcome: Ongoing  4/1/2020 1042 by Shade Malik RN  Outcome: Ongoing     Problem: HEMODYNAMIC STATUS  Goal: Patient has stable vital signs and fluid balance  Outcome: Ongoing     Problem: ACTIVITY INTOLERANCE/IMPAIRED MOBILITY  Goal: Mobility/activity is maintained at optimum level for patient  Outcome: Ongoing     Problem: COMMUNICATION IMPAIRMENT  Goal: Ability to express needs and understand communication  Outcome: Ongoing     Problem: Nutrition  Goal: Optimal nutrition therapy  4/1/2020 2209 by Myrna Heller RN  Outcome: Ongoing  4/1/2020 1058 by oZhreh Britton RD, LD  Outcome: Ongoing

## 2020-04-03 LAB — MAGNESIUM: 1.9 MG/DL (ref 1.8–2.4)

## 2020-04-03 PROCEDURE — 97166 OT EVAL MOD COMPLEX 45 MIN: CPT

## 2020-04-03 PROCEDURE — 97535 SELF CARE MNGMENT TRAINING: CPT

## 2020-04-03 PROCEDURE — 83735 ASSAY OF MAGNESIUM: CPT

## 2020-04-03 PROCEDURE — 97162 PT EVAL MOD COMPLEX 30 MIN: CPT

## 2020-04-03 PROCEDURE — 1280000000 HC REHAB R&B

## 2020-04-03 PROCEDURE — 92610 EVALUATE SWALLOWING FUNCTION: CPT

## 2020-04-03 PROCEDURE — 36415 COLL VENOUS BLD VENIPUNCTURE: CPT

## 2020-04-03 PROCEDURE — 6370000000 HC RX 637 (ALT 250 FOR IP): Performed by: PHYSICAL MEDICINE & REHABILITATION

## 2020-04-03 PROCEDURE — 92526 ORAL FUNCTION THERAPY: CPT

## 2020-04-03 PROCEDURE — 92523 SPEECH SOUND LANG COMPREHEN: CPT

## 2020-04-03 PROCEDURE — 97530 THERAPEUTIC ACTIVITIES: CPT

## 2020-04-03 RX ORDER — HYDRALAZINE HYDROCHLORIDE 25 MG/1
50 TABLET, FILM COATED ORAL EVERY 8 HOURS PRN
Status: DISCONTINUED | OUTPATIENT
Start: 2020-04-03 | End: 2020-04-24 | Stop reason: HOSPADM

## 2020-04-03 RX ORDER — DIPHENHYDRAMINE HCL 25 MG
25 TABLET ORAL EVERY 6 HOURS PRN
Status: DISCONTINUED | OUTPATIENT
Start: 2020-04-03 | End: 2020-04-24 | Stop reason: HOSPADM

## 2020-04-03 RX ORDER — ONDANSETRON 4 MG/1
4 TABLET, ORALLY DISINTEGRATING ORAL EVERY 8 HOURS PRN
Status: DISCONTINUED | OUTPATIENT
Start: 2020-04-03 | End: 2020-04-24 | Stop reason: HOSPADM

## 2020-04-03 RX ORDER — TRAZODONE HYDROCHLORIDE 50 MG/1
50 TABLET ORAL NIGHTLY PRN
Status: DISCONTINUED | OUTPATIENT
Start: 2020-04-03 | End: 2020-04-24 | Stop reason: HOSPADM

## 2020-04-03 RX ORDER — ASPIRIN 81 MG/1
81 TABLET, CHEWABLE ORAL DAILY
Status: DISCONTINUED | OUTPATIENT
Start: 2020-04-03 | End: 2020-04-24 | Stop reason: HOSPADM

## 2020-04-03 RX ADMIN — DILTIAZEM HYDROCHLORIDE 30 MG: 60 TABLET, FILM COATED ORAL at 13:59

## 2020-04-03 RX ADMIN — DILTIAZEM HYDROCHLORIDE 30 MG: 60 TABLET, FILM COATED ORAL at 06:05

## 2020-04-03 RX ADMIN — LEVOFLOXACIN 500 MG: 500 TABLET, FILM COATED ORAL at 09:57

## 2020-04-03 RX ADMIN — DESMOPRESSIN ACETATE 40 MG: 0.2 TABLET ORAL at 22:05

## 2020-04-03 RX ADMIN — DILTIAZEM HYDROCHLORIDE 30 MG: 60 TABLET, FILM COATED ORAL at 01:47

## 2020-04-03 RX ADMIN — PANTOPRAZOLE SODIUM 40 MG: 40 TABLET, DELAYED RELEASE ORAL at 06:05

## 2020-04-03 RX ADMIN — DILTIAZEM HYDROCHLORIDE 30 MG: 60 TABLET, FILM COATED ORAL at 16:52

## 2020-04-03 RX ADMIN — ASPIRIN 81 MG 81 MG: 81 TABLET ORAL at 09:57

## 2020-04-03 RX ADMIN — POTASSIUM BICARBONATE 40 MEQ: 782 TABLET, EFFERVESCENT ORAL at 09:57

## 2020-04-03 RX ADMIN — LOSARTAN POTASSIUM 100 MG: 100 TABLET, FILM COATED ORAL at 09:57

## 2020-04-03 NOTE — PROGRESS NOTES
Occupational Therapy   Occupational Therapy Initial Assessment & Treatment   Date: 4/3/2020   Patient Name: Filipe Betts  MRN: [id number]     : 11/3/1933    Date of Service: 4/3/2020    Discharge Recommendations:  Continue to assess pending progress, Patient would benefit from continued therapy after discharge  OT Equipment Recommendations  Equipment Needed: No  Other: Continue to assess pending pt progress    Assessment   Performance deficits / Impairments: Decreased functional mobility ; Decreased strength;Decreased endurance;Decreased ADL status; Decreased safe awareness;Decreased cognition;Decreased balance;Decreased fine motor control;Decreased posture;Decreased coordination  Assessment: Pt presents w/ the above deficits which are impacting her occupational performance. Pt would benefit from continued therapy during inpatient stay in order to maximize safety and independence upon discharge. Treatment Diagnosis: Multiple performance deficits associated w/ CVA  Prognosis: Fair  Decision Making: Medium Complexity  OT Education: OT Role;Plan of Care;ADL Adaptive Strategies;Transfer Training;Equipment  Patient Education: Pt verbalized understanding but will require continued reinforcement to promote carryover  Barriers to Learning: Cognition  REQUIRES OT FOLLOW UP: Yes  Activity Tolerance  Activity Tolerance: Patient Tolerated treatment well  Safety Devices  Safety Devices in place: Yes  Type of devices: Call light within reach;Nurse notified; Left in chair;Chair alarm in place;Gait belt           Patient Diagnosis(es): There were no encounter diagnoses. has a past medical history of Cerebral artery occlusion with cerebral infarction [facility]), Cerebral vascular disease, Hyperlipidemia, Hypertension, Peripheral vascular disease ([address] and Urinary incontinence. has a past surgical history that includes  section; Hysterectomy; and Carotid endarterectomy (Right, 2005).     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 Pt has a history of dementia-above information will need to be checked for accuracy w/ family members    Objective   Vision: Impaired  Vision Exceptions: Wears glasses for reading  Hearing: Within functional limits    Orientation  Overall Orientation Status: Within Functional Limits  Orientation Level: Disoriented to place  Observation/Palpation  Posture: Poor  Observation: Severe flexion contractureLUE, all joints affected. Balance  Sitting Balance: Contact guard assistance  Standing Balance: Dependent/Total(Max A x2)  Standing Balance  Time: ~2 min total   Activity: Transfers, ADL completion  Toilet Transfers  Equipment Used: Standard bedside commode  Toilet Transfer: Dependent/Total  Toilet Transfers Comments: Use of stedy    Shower Transfers  Shower - Transfer From: Wheelchair  Shower - Transfer Type: To and From  Shower - Transfer To: Other  Shower - Technique: To right  Shower Transfers: Dependent  Shower Transfers Comments: Max A x2>BSC plaed in shower   ADL  Grooming: Minimal assistance(oral care while seated in w/c, set up required, Min A for forward leaning to sink for rinsing )  UE Bathing: Moderate assistance  LE Bathing: Maximum assistance  UE Dressing: Maximum assistance(To don t-shirt )  LE Dressing: Dependent/Total(Second person assist present for LB clothing management in stance )  Toileting: Dependent/Total  Additional Comments: Pt reporting urgency to use toilet, use of stedy for initial transfers. EOB>stedy>bariatic BSC placed in shower. Pt completed transfer, voided urine/BM.  Pt completed UB/LB bathing and dressing while seated on BSC in

## 2020-04-03 NOTE — PROGRESS NOTES
Nutrition Assessment    Type and Reason for Visit: Initial, Consult(new ARU admission)    Nutrition Recommendations:   1. Diet consistency per SLP  2. Magic cup TID  3. Obtain current weight to better assess nutrition status    Nutrition Assessment: Pt admitted to ARU s/p Stroke. Nutritionally compromised d/t recent hx poor po intake related to modified texture diet. Pt dislikes pureed consistency, but has been observed choking on advanced textures. SLP continues to recommend Pureed diet, with upgrade to thin liquids. Pt likes magic cups; encouraged to consume TID to avoid decline in nutrition status. Last wt on record was 4/1 at 155 lb. No wt hx to review for changes; pt unaware of wt loss. Will monitor progress & con't to encourage po & supp intake. Malnutrition Assessment:  · Malnutrition Status: No malnutrition  · Context: Chronic illness  · Findings of the 6 clinical characteristics of malnutrition (Minimum of 2 out of 6 clinical characteristics is required to make the diagnosis of moderate or severe Protein Calorie Malnutrition based on AND/ASPEN Guidelines):  1. Energy Intake-Less than or equal to 75% of estimated energy requirement, Greater than or equal to 5 days    2. Weight Loss-Unable to assess,    3. Fat Loss-No significant subcutaneous fat loss,    4. Muscle Loss-No significant muscle mass loss,    5. Fluid Accumulation-No significant fluid accumulation,    6.   Strength- NA    Nutrition Risk Level: High    Nutrient Needs:  · Estimated Daily Total Kcal: 4916-9768  · Estimated Daily Protein (g): 62- 72(1.3-1.5)  · Estimated Daily Total Fluid (ml/day): 1 ml/kcal    Nutrition Diagnosis:   · Problem: Inadequate oral intake  · Etiology: related to Insufficient energy/nutrient consumption     Signs and symptoms:  as evidenced by Patient report of, Diet history of poor intake(dislikes food on modified consistency diet)    Objective Information:  · Nutrition-Focused Physical Findings:

## 2020-04-03 NOTE — PROGRESS NOTES
Admission Period/Goal QM Codes    QM Admit Code Goal Code   Eating 3 5   Oral Hygiene 3 5   Toileting Hygiene 1 6   Shower/Bathing 2 3   UB Dressing 2 3   LB Dressing 1 3   Putting on/off Footwear 1 3   Rolling Left and Right 2 4   Sit To Lying 2 4   Lying to Sitting on Bedside 2 4   Sit to Stand 1 4   Chair/Bed to Chair Transfer 1 4   Toilet Transfers 1 -   Car Transfers 88 -   Walk 10 Feet 88 -   Walk 50 Feet with Two Turns 88 -   Walk 150 Feet 88 -   Walk 10 Feet on Uneven Surfaces 88 -   1 Step (Curb) 88 -   4 Steps 88 -   12 Steps 80 -   Picking up Object from Floor 88 4   Wheel 50 Feet with 2 Turns 1 4   Type manual    Wheel 150 Feet 88 4   Type manual        Following discussion at the [place] [id number], the above codes were determined to be the patient's usual performance at admission. OT:  Freddie Blackwell OTR/L, [facility] #[id number], 4/7/2020, 1058      PT:   Madiha Paz, DPT [id number] 4/6/2020 721     RN:  Elyssa Carlton  RN MSN CRRN 04/06/2020  1036     ST:  Violetta Newberry CCC-SLP 4/3/2020 1552     : Abundio Boxer[address], 4/6/2020 1017

## 2020-04-03 NOTE — PROGRESS NOTES
declines. Prognosis:  Speech Therapy Prognosis  Prognosis: Fair  Prognosis Considerations: Participation Level  Individuals consulted  Consulted and agree with results and recommendations: Patient;RN    Education:  Patient Education: Provided education re SLP evaluation and tx recommendations   Patient Education Response: Verbalizes understanding;Needs reinforcement  Safety Devices in place: Yes  Type of devices: All fall risk precautions in place     Pain:  Pain Assessment  Pain Assessment: 0-10  Pain Level: 0      Therapy Time:   Individual Concurrent Group Co-treatment   Time In 0905          Time Out 0930          Minutes 25                   Signature:   Derick Rae M.A.  CCC-SLP S.P. [id number]  Speech-Language Pathologist [phone]  4/3/2020 3:48 PM

## 2020-04-03 NOTE — PROGRESS NOTES
Physical Therapy    Facility/Department: [facility] ACUTE REHAB UNIT  Initial Assessment    NAME: Patricia Lane  : 11/3/1933  MRN: [id number]    Date of Service: 4/3/2020    Discharge Recommendations:  24 hour supervision or assist, Home with Home health PT, S Level 3   PT Equipment Recommendations  Equipment Needed: No  Other: Pt owns w/c at home    Assessment   Body structures, Functions, Activity limitations: Decreased functional mobility ; Decreased endurance;Decreased cognition;Decreased strength;Decreased ROM; Decreased balance;Decreased posture  Assessment: Pt presents with decreased posture, endurance, balance, and functional mobility related to chronic and acute CVA. Pt will benefit from skilled PT services to address the above stated deficits and return to PLOF and increase safety in the home environment. Treatment Diagnosis: impaired mobility and balance  Prognosis: Fair  Decision Making: Medium Complexity  PT Education: Goals;PT Role;Plan of Care;Transfer Training;Functional Mobility Training  Patient Education: Pt requires reinforcement of education  Barriers to Learning: cognition  REQUIRES PT FOLLOW UP: Yes  Activity Tolerance  Activity Tolerance: Patient Tolerated treatment well       Patient Diagnosis(es): CVA. has a past medical history of Cerebral artery occlusion with cerebral infarction [facility]), Cerebral vascular disease, Hyperlipidemia, Hypertension, Peripheral vascular disease ([facility] [address] and Urinary incontinence. has a past surgical history that includes  section; Hysterectomy; and Carotid endarterectomy (Right, 2005). Restrictions  Restrictions/Precautions  Restrictions/Precautions: Fall Risk  Required Braces or Orthoses?: No  Position Activity Restriction  Other position/activity restrictions: 80 y.o. female patient presenting by EMS from home. Patient lives with her daughter and son-in-law. Patient in usual state of health on Tuesday.   Yesterday morning, Wednesday she against gravity, minimal ankle movement  Tone LLE  Tone Description: pt demonstrating preference for L knee extension, but pt able to break tone for knee flexion  Motor Control  Gross Motor?: (unable to formally assess, but demonstrating extension pattern of LLE)  Sensation  Overall Sensation Status: (unable to formally assess, but did not demonstrate signficant deficits)  Bed mobility  Rolling to Left: Maximum assistance  Supine to Sit: Maximum assistance  Scooting: Maximal assistance  Transfers  Sit to Stand: Dependent/Total;2 Person Assistance(maxA of 1 and modA of 1)  Stand to sit: 2 Person Assistance;Dependent/Total(maxA of 1 and modA of 1)  Bed to Chair: 2 Person Assistance;Dependent/Total(maxA of 2 with stedy)  Stand Pivot Transfers: Dependent/Total;2 Person Assistance(maxA of 2)  Ambulation  Ambulation?: No(unsafe at this time, pt may not ambulate at baseline)  Stairs/Curb  Stairs?: No(has a ramp and no stairs at home)     Balance  Posture: Poor  Sitting - Static: Good  Sitting - Dynamic: Good;-  Standing - Static: Poor  Standing - Dynamic: Poor  Comments: Pt able to sit on BSC in shower with supervision for static sitting and SBA for dynamic sitting. Pt demonstrating R cervical rotation with R gaze, but able to rotate fully left.   Pt with L trunk rotation and BLE mildly windswept R.        Plan   Plan  Times per week: 60 minutes a day 5 days a week  Current Treatment Recommendations: Functional Mobility Training, Neuromuscular Re-education, Balance Training, Strengthening, Transfer Training, Wheelchair Mobility Training, Manual Therapy - Soft Tissue Mobilization, Endurance Training, Safety Education & Training, Positioning, Equipment Evaluation, Education, & procurement, ROM  Safety Devices  Type of devices: Call light within reach, Chair alarm in place, Left in chair, Nurse notified, Gait belt, All fall risk precautions in place  Restraints  Initially in place: No    OutComes Score Goals  Short term goals  Time Frame for Short term goals: 1-2 weeks  Short term goal 1: Perform all bed mobility with Adriana  Short term goal 2: Perform all transfers mod A  Short term goal 3: Propel w/c 25' with Adriana  Long term goals  Time Frame for Long term goals : 2-3 weeks  Long term goal 1: Perform all bed mobility SBA  Long term goal 2: Perform all transfers SBA  Long term goal 3: Propel w/c 48' with supervision  Patient Goals   Patient goals : \"to transfer on my own again\"       Therapy Time   Individual Concurrent Group Co-treatment   Time In       0730   Time Out       0830   Minutes       60         Timed Code Treatment Minutes:  45    Total Treatment Minutes:  [address], [facility] [place], [place] [address]

## 2020-04-03 NOTE — PLAN OF CARE
Problem: Falls - Risk of:  Goal: Will remain free from falls  Description: Will remain free from falls  Outcome: Ongoing     Problem: Falls - Risk of:  Goal: Absence of physical injury  Description: Absence of physical injury  Outcome: Ongoing

## 2020-04-03 NOTE — H&P
pressure, nasal congestion, and epistaxis. RESPIRATORY: Negative for hemoptysis, cough, sputum production. CARDIOVASCULAR: negative for chest pain, palpitations, exertional chest pressure/discomfort, edema, syncope. GASTROINTESTINAL: negative for nausea, vomiting, diarrhea, constipation, blood in stool and abdominal pain. GENITOURINARY: negative for frequency, dysuria, urinary incontinence, decreased urine volume, and hematuria. HEMATOLOGIC/LYMPHATIC: negative for easy bruising, bleeding and lymphadenopathy. ALLERGIC/IMMUNOLOGIC: negative for recurrent infections, angioedema, anaphylaxis and drug reactions. ENDOCRINE: negative for weight changes and diabetic symptoms including polyuria, polydipsia and polyphagia. MUSCULOSKELETAL: negative for pain, joint swelling, decreased range of motion and muscle weakness. NEUROLOGICAL: negative for headaches, slurred speech. positive unilateral weakness. PSYCHIATRIC/BEHAVIORAL: negative for hallucinations, behavioral problems, confusion and agitation. All pertinent positives are noted in the HPI. Physical Examination:  Vitals:   Patient Vitals for the past 24 hrs:   BP Temp Temp src Pulse Resp SpO2   04/03/20 0605 (!) 164/84 -- -- -- -- --   04/02/20 2038 (!) 166/74 98.2 °F (36.8 °C) Oral 67 16 96 %   04/02/20 1645 (!) 162/74 98 °F (36.7 °C) Oral 78 16 98 %     Psych: Labile mood, normal judgement, normal affect   Const: No distress  Eyes: Conjunctiva noninjected, no icterus noted; pupils equal, round. HENT: Atraumatic, normocephalic; Oral mucosa moist. Sialorrhea  Neck: Trachea midline, neck supple. No thyromegaly noted. CV: Regular rate and rhythm, no murmur rub or gallop noted  Resp: Lungs clear to auscultation bilaterally, no rales wheezes or ronchi, no retractions. Respirations unlabored. GI: Soft, nontender, nondistended. Normal bowel sounds. No palpable masses. Neuro: Alert, oriented, appropriate. No cranial nerve deficits appreciated. Sensation intact to light touch diffusely. Motor examination reveals: RUE 4/5 diffusely RLE 4/5 diffusely with a mild equinovarus positioning LUE contracted with 0/5 strength and unable to fully abduct shoulder, extend elbow, or extend fingers and wrist due to pain, LLE 2/5 with equinovarous positioning and unable to obtain neutral  Skin: Normal temperature and turgor, no apparent skin breakdown of LUE or axilla  MSK: No joint abnormalities noted outside of above  Ext: No significant edema appreciated. No varicosities.     Lab Results   Component Value Date    WBC 9.6 04/02/2020    HGB 11.6 (L) 04/02/2020    HCT 34.7 (L) 04/02/2020    MCV 95.8 04/02/2020     04/02/2020     Lab Results   Component Value Date    INR 1.10 03/26/2020    INR 1.04 11/13/2016    PROTIME 12.8 03/26/2020    PROTIME 11.9 11/13/2016     Lab Results   Component Value Date    CREATININE 0.6 04/02/2020    BUN 11 04/02/2020     04/02/2020    K 3.5 04/02/2020     04/02/2020    CO2 25 04/02/2020     Lab Results   Component Value Date    ALT 33 03/26/2020    AST 29 03/26/2020    ALKPHOS 102 03/26/2020    BILITOT 0.7 03/26/2020       Most recent echocardiogram revealed:  Left ventricular cavity size is normal with mild concentric left ventricular   hypertrophy and normal systolic function.   Ejection fraction is visually estimated to be 65-70%.   The right ventricle is normal in size and function.   The left atrium is severely dilated.   Mild tricuspid regurgitation.     Most recent EKG revealed:  Atrial fibrillation with rapid ventricular responseST & T wave abnormality, consider lateral ischemiaAbnormal ECGWhen compared with ECG of 26-MAR-2020 18:13,Atrial fibrillation has replaced Sinus rhythmST now depressed in Lateral leadsT wave inversion now evident in Lateral leadsConfirmed by Janae LEARY MD ([id number]) on 3/31/2020 8:36:27 AM     Most recent imaging studies revealed:     MRI brain  Acute infarct within the left occipital lobe

## 2020-04-03 NOTE — CARE COORDINATION
Social Work Admission Assessment    Objective:  Spoke with pt to complete initial assessment and review role of  in rehab process. Pt oriented to unit. Pt states understanding of this. Current Home Situation:  Patient lives , dtr, son in law- they are home w/ pt. Pt has an aide through home care. Pt has a wheelchair at home already. Pt has advance directives. Pt's plans re:  Return to work/school/volunteer:  Pt is home bound. Accessibility to community resources/transportation:  Pt's  transports. Has pt experienced a recent loss or signigicant life event that would impact their care or ability to participate? __No  _X_Yes - Explain: Strokes with loss and deficits. Has pt ever been treated for emotional disorders? _X_No  __Yes--How does that affect current situation:    How does pt and family cope with stressful events and this hospitalization? Pt sounds to be doing well, no concerns voiced. Special Problem Areas: None     Discharge Plan:  Home with home care and appropriate support, await progress with therapy for recommendations. Impression/Plan:  Bucky Mendez (patient) is an 80year old female that has been admitted to ARU. Will continue to follow for support and discharge planning.  Chas Amado, MSW, LSW

## 2020-04-04 ENCOUNTER — APPOINTMENT (OUTPATIENT)
Dept: GENERAL RADIOLOGY | Age: 85
DRG: 056 | End: 2020-04-04
Attending: PHYSICAL MEDICINE & REHABILITATION
Payer: MEDICARE

## 2020-04-04 PROCEDURE — 97130 THER IVNTJ EA ADDL 15 MIN: CPT

## 2020-04-04 PROCEDURE — 97112 NEUROMUSCULAR REEDUCATION: CPT

## 2020-04-04 PROCEDURE — 71045 X-RAY EXAM CHEST 1 VIEW: CPT

## 2020-04-04 PROCEDURE — 97129 THER IVNTJ 1ST 15 MIN: CPT

## 2020-04-04 PROCEDURE — 1280000000 HC REHAB R&B

## 2020-04-04 PROCEDURE — 97110 THERAPEUTIC EXERCISES: CPT

## 2020-04-04 PROCEDURE — 92526 ORAL FUNCTION THERAPY: CPT

## 2020-04-04 PROCEDURE — 6370000000 HC RX 637 (ALT 250 FOR IP): Performed by: PHYSICAL MEDICINE & REHABILITATION

## 2020-04-04 PROCEDURE — 97530 THERAPEUTIC ACTIVITIES: CPT

## 2020-04-04 RX ORDER — LANSOPRAZOLE 30 MG/1
30 TABLET, ORALLY DISINTEGRATING, DELAYED RELEASE ORAL
Status: DISCONTINUED | OUTPATIENT
Start: 2020-04-05 | End: 2020-04-24 | Stop reason: HOSPADM

## 2020-04-04 RX ADMIN — DILTIAZEM HYDROCHLORIDE 30 MG: 60 TABLET, FILM COATED ORAL at 00:22

## 2020-04-04 RX ADMIN — ACETAMINOPHEN 650 MG: 325 TABLET, FILM COATED ORAL at 04:00

## 2020-04-04 RX ADMIN — ASPIRIN 81 MG 81 MG: 81 TABLET ORAL at 08:22

## 2020-04-04 RX ADMIN — DILTIAZEM HYDROCHLORIDE 30 MG: 60 TABLET, FILM COATED ORAL at 23:50

## 2020-04-04 RX ADMIN — ACETAMINOPHEN 650 MG: 325 TABLET, FILM COATED ORAL at 21:17

## 2020-04-04 RX ADMIN — DESMOPRESSIN ACETATE 40 MG: 0.2 TABLET ORAL at 21:17

## 2020-04-04 RX ADMIN — DILTIAZEM HYDROCHLORIDE 30 MG: 60 TABLET, FILM COATED ORAL at 12:54

## 2020-04-04 RX ADMIN — DILTIAZEM HYDROCHLORIDE 30 MG: 60 TABLET, FILM COATED ORAL at 17:36

## 2020-04-04 RX ADMIN — PANTOPRAZOLE SODIUM 40 MG: 40 TABLET, DELAYED RELEASE ORAL at 06:47

## 2020-04-04 RX ADMIN — LEVOFLOXACIN 500 MG: 500 TABLET, FILM COATED ORAL at 08:22

## 2020-04-04 RX ADMIN — DILTIAZEM HYDROCHLORIDE 30 MG: 60 TABLET, FILM COATED ORAL at 06:46

## 2020-04-04 RX ADMIN — POTASSIUM BICARBONATE 40 MEQ: 782 TABLET, EFFERVESCENT ORAL at 08:22

## 2020-04-04 RX ADMIN — LOSARTAN POTASSIUM 100 MG: 100 TABLET, FILM COATED ORAL at 08:22

## 2020-04-04 ASSESSMENT — PAIN DESCRIPTION - ORIENTATION
ORIENTATION: LEFT
ORIENTATION: LEFT

## 2020-04-04 ASSESSMENT — PAIN SCALES - GENERAL
PAINLEVEL_OUTOF10: 9
PAINLEVEL_OUTOF10: 8
PAINLEVEL_OUTOF10: 0
PAINLEVEL_OUTOF10: 0
PAINLEVEL_OUTOF10: 8

## 2020-04-04 ASSESSMENT — PAIN DESCRIPTION - ONSET: ONSET: SUDDEN

## 2020-04-04 ASSESSMENT — PAIN DESCRIPTION - LOCATION
LOCATION: BACK;LEG
LOCATION: BACK;LEG

## 2020-04-04 ASSESSMENT — PAIN DESCRIPTION - FREQUENCY: FREQUENCY: INTERMITTENT

## 2020-04-04 ASSESSMENT — PAIN DESCRIPTION - DESCRIPTORS: DESCRIPTORS: SHOOTING;SHARP;RADIATING

## 2020-04-04 ASSESSMENT — PAIN DESCRIPTION - PROGRESSION: CLINICAL_PROGRESSION: GRADUALLY WORSENING

## 2020-04-04 ASSESSMENT — PAIN DESCRIPTION - PAIN TYPE: TYPE: ACUTE PAIN

## 2020-04-04 ASSESSMENT — PAIN - FUNCTIONAL ASSESSMENT: PAIN_FUNCTIONAL_ASSESSMENT: ACTIVITIES ARE NOT PREVENTED

## 2020-04-04 NOTE — PROGRESS NOTES
isolation   - tolerated 8/8 presentations of thin liquid via cup   - signs of premature bolus loss     Thin liquids via popsicle   - delayed weak cough on 2/15 presentations         Pt will improve oral motor strength and ROM for coordinated and alternating motions, via graded tasks to improve speech back to baseline level as subjectively rated by SLP/pt and family. Not targeted this session. Goal not targeted this session. The patient will demonstrate improved self awareness and awareness of deficits related to CVA and impact on daily function. Not directed targeted this date. Pt denied any relation to her swallow with CVA, required mod-max cues to accept overt deficits (anterior bolus loss)     Pt continuously requesting to go home during session. Required mod-max cues to correlate physical limitations to CVA and levels of assistance she would require at home      Pt will complete short term memory tasks and recall fx information for improved recall and carryover of information from day to day. [place] memory task, with convergent thought component. Pt required moderate VC to complete task. Convergent thought 80% accuracy. Short term memory, facilitated with mnemonic and rehearsal, 80% Goal not targeted this session. Other areas targeted:     Education:   Pt educated re: dysphagia strategies and SLP POC Ongoing education re rationale for treatment tasks and plan of care. Safety Devices: [x] Call light within reach  [x] Chair alarm activated  [] Bed alarm activated  [] Other: [x] Call light within reach  [] Chair alarm activated  [x] Bed alarm activated  [] Other:      Assessment:   Speech Therapy Diagnosis  Cognitive Diagnosis: Pt presents with moderate to severe cognitive linguistic deficits in the domains of judgement, insight into deficits, working and short term memory, and attention. Pt with history of dementia and has 24/7 supervision-assistance from family.    Speech Diagnosis: Pt presents with mild dysarthria due to left perioral weakness. Conversational intelligibility was mildly reduced and also impacted by wet vocal quality after intake of solids. Current Diet Order: DIET DYSPHAGIA PUREED; Dietary Nutrition Supplements: Frozen Oral Supplement   Recommended Form of Meds: Crushed in puree as able(Pt reported she does not like the taste of medications )  Compensatory Swallowing Strategies: Small bites/sips, Assist feed, Remain upright for 30-45 minutes after meals, Upright as possible for all oral intake, Eat/Feed slowly     Plan:   Speech therapy 5 days wk/ 60 minutes per day    Frequency:  5days/week   60 minutes/day    Discharge Recommendations:   Barriers: cognition, awareness, insight, physical limitations   Discharge Recommendations:  [] Home independently  [x] Home with assistance []  24 hour supervision  [] SNF [] Other:  Continued SLP Treatment:  [x] Yes [] No [] TBD based on progress while on ARU [] Vital Stim indicated [] Other:   Estimated discharge date: TBD    Type of Total Treatment Minutes   Session 1   Session 2   Time In 0911 1105   Time Out 0951 1130   Timed Code Minutes  20 10   Individual Treatment Minutes  40 25   Co-Treatment Minutes      Group Treatment Minutes      Concurrent Treatment Minutes        TOTAL DAILY MINUTES:  65 minutes    Electronically Signed by       [address] [place] [address] 1:  Nadeen Reed MA, [address] Pathologist [phone]    [place] Session 2:   Brian Olea M.A.  CCC-SLP S.P. [id number]  Speech-Language Pathologist [phone]  4/4/2020 11:44 AM

## 2020-04-04 NOTE — PLAN OF CARE
Problem: IP BLADDER/VOIDING  Goal: STG - Patient demonstrates no accidents  Outcome: Ongoing     Problem: IP BOWEL ELIMINATION  Goal: STG - Patient participates in bowel management program  Outcome: Ongoing     Problem: NUTRITION  Goal: Patient maintains adequate hydration  Outcome: Ongoing     Problem: SAFETY  Goal: LTG - Patient will demonstrate safety requirements appropriate to situation/environment  Outcome: Ongoing     Problem: SKIN INTEGRITY  Goal: STG - patient will maintain good skin integrity  Outcome: Ongoing  Goal: STG - patient demonstrates pressure reduction techniques  Outcome: Ongoing     Problem: PAIN  Goal: LTG - Patient will demonstrate intervention for managing pain  Outcome: Ongoing  Goal: STG - pain is manageable through therapies  Outcome: Ongoing     Problem: Falls - Risk of:  Goal: Will remain free from falls  Description: Will remain free from falls  Outcome: Ongoing  Goal: Absence of physical injury  Description: Absence of physical injury  Outcome: Ongoing     Problem: Neurological  Goal: Maximum potential motor/sensory/cognitive function  Outcome: Ongoing     Problem: Nutrition  Goal: Optimal nutrition therapy  4/3/2020 2158 by Argelia Diaz RN  Outcome: Ongoing  4/3/2020 1226 by Joey Gary RD, LD  Outcome: Ongoing

## 2020-04-04 NOTE — PROGRESS NOTES
which has progressed. Phone call to PCP indicating concern for UTI. Requested UA. UA not obtained. Cipro initiated. I did speak with daughter Homar Alonzo at [phone] who indicated she had similar presentation in October 1, 2017 at which time she was admitted with altered mental status secondary to UTI. She was admitted to ICU because of hypotension. At this time the patient is combative and confused. According to daughter Homar Alonzo does indicate that this is a clear deviation from her baseline. Subjective   General  Chart Reviewed: Yes  Additional Pertinent Hx: Pt is an 80 y.o. female who presented to the ED on 3/26/20 with AMS s/p fall out of w/c and hitting head. 3-31 MRI (+) Acute infarct within the left occipital lobe with possible petechial hemorrhage. Response To Previous Treatment: Patient with no complaints from previous session. Family / Caregiver Present: No  Referring Practitioner: Dr Noelle Villagran  Subjective  Subjective: Patient reports her left arm has been in flexed contracture for approximately 10 years. General Comment  Comments: Patient seated in w/c, eating breakfast.  Pain Screening  Patient Currently in Pain: Yes  Pain Assessment  Pain Assessment: 0-10  Pain Level: 8  Pain Type: Acute pain  Pain Location: Back;Leg  Pain Orientation: Left  Pain Descriptors: Shooting; Sharp;Radiating  Pain Frequency: Intermittent  Pain Onset: Sudden  Clinical Progression: Gradually worsening  Functional Pain Assessment: Activities are not prevented  Non-Pharmaceutical Pain Intervention(s): Repositioned  Vital Signs  Patient Currently in Pain: Yes       Orientation  Orientation  Overall Orientation Status: Within Functional Limits  Orientation Level: Oriented X4     Objective   Bed mobility  Comment: Not observed. Patient in w/c. Transfers  Sit to Stand: Dependent/Total  Stand to sit: Dependent/Total  Comment: Total assist x1, unable to come to full standing.   Maintained semi-standing position approximately 30 seconds Perform all bed mobility SBA  Long term goal 2: Perform all transfers SBA  Long term goal 3: Propel w/c 48' with supervision  Patient Goals   Patient goals : \"to transfer on my own again\"  No therapy goals met this date.     Plan    Plan  Times per week: 60 minutes a day 5 days a week  Current Treatment Recommendations: Functional Mobility Training, Neuromuscular Re-education, Balance Training, Strengthening, Transfer Training, Wheelchair Mobility Training, Manual Therapy - Soft Tissue Mobilization, Endurance Training, Safety Education & Training, Positioning, Equipment Evaluation, Education, & procurement, ROM  Safety Devices  Type of devices: Call light within reach, Chair alarm in place, Left in chair, Nurse notified, Gait belt, All fall risk precautions in place  Restraints  Initially in place: No     Therapy Time   Individual Concurrent Group Co-treatment   Time In       0830   Time Out       0900   Minutes       30   Timed Code Treatment Minutes: 30 Minutes      Individual Concurrent Group Co-treatment   Time In       1030   Time Out       1100   Minutes       30   Timed Code Treatment Minutes: [address], [place], [facility] [address] [facility], ATC-R [id number]

## 2020-04-04 NOTE — PROGRESS NOTES
Risk  Required Braces or Orthoses?: No  Position Activity Restriction  Other position/activity restrictions: 80 y.o. female patient presenting by EMS from home. Patient lives with her daughter and son-in-law. Patient in usual state of health on Tuesday. Yesterday morning, Wednesday she woke with altered mental status which has progressed. Phone call to PCP indicating concern for UTI. Requested UA. UA not obtained. Cipro initiated. I did speak with eulogio Astorga at [phone] who indicated she had similar presentation in October 1, 2017 at which time she was admitted with altered mental status secondary to UTI. She was admitted to ICU because of hypotension. At this time the patient is combative and confused. According to eulogio Astorga does indicate that this is a clear deviation from her baseline. Subjective   General  Chart Reviewed: Yes  Patient assessed for rehabilitation services?: Yes  Additional Pertinent Hx: Per Kylee Schultz MD: Pt is \"80 y.o. female with PMH significant for dementia, CVA with left-sided weakness, PVD, hypertension, hyperlipidemia who presents to [facility] with acute encephalopathy. Per family patient was becoming more confused over the last day. Spoke with PCP who recommended starting Cipro as she was unable to come in for urinalysis and they were unable to obtain at home. Patient unable to give any history. Spoke with  eulogio Astorga over the phone, says that they started noticing change in mental status a few days ago. They had tried to get a UA but were not able to and she was started empirically on cipro yesterday. This morning was more confused and fell while trying to get into wheelchair, hitting side of her head. No fevers at home, no cough or SOB and not endorsing any pain or localizing symptoms. Normally knows where she is and what is going on.  \"  Response to previous treatment: Patient with no complaints from previous session  Family / Caregiver Present: No  Referring Practitioner: Shanda Saha MD  Diagnosis: Altered mental status  Subjective  Subjective: patient sitting up in chair both sessions and agreeable to therapy  Vital Signs  Patient Currently in Pain: No   Orientation  Orientation  Overall Orientation Status: Within Functional Limits  Objective    ADL  Feeding: Setup;Stand by assistance(complete set up/ stand by to stabilize bowls during scooping)  LE Dressing: Maximum assistance(from bed)  Toileting: Dependent/Total(incontinent of bowel and bladder)        Balance  Sitting Balance: Contact guard assistance  Standing Balance: Dependent/Total(Max A of one amd min of one)  Standing Balance  Time: ~2 min total   Activity: Transfers, chair to bed  Bed mobility  Rolling to Left: Moderate assistance  Rolling to Right: Moderate assistance;Maximum assistance  Supine to Sit: Maximum assistance  Sit to Supine: Maximum assistance  Scooting: Maximal assistance  Transfers  Sit to stand: 2 Person assistance  Stand to sit: 2 Person assistance  Transfer Comments: max of one and min of one                       Cognition  Overall Cognitive Status: Exceptions  Arousal/Alertness: Delayed responses to stimuli  Following Commands:  Follows one step commands with increased time  Attention Span: Attends with cues to redirect  Memory: Decreased recall of recent events;Decreased short term memory  Safety Judgement: Decreased awareness of need for assistance;Decreased awareness of need for safety  Problem Solving: Assistance required to generate solutions;Assistance required to implement solutions  Insights: Decreased awareness of deficits  Initiation: Requires cues for some  Sequencing: Requires cues for some                 Upper Extremity Function  UE PROM: in supine patient worked on hip bridging, gentle stretch of right hip into aduction, mobilization of left scapular , attempted gentle mobilization of gleno/ humeral joint / posterior glide to improve joint aligment however patient very tight anteriorand superior sublux, able to achieve some external rotation of shoulder with use of towel . with minimal decrease in flexor tone at elbow                       Plan   Plan  Times per week: 60 min; 5-7x week   Times per day: Daily  Current Treatment Recommendations: Functional Mobility Training, Strengthening, Endurance Training, Balance Training, Safety Education & Training, Equipment Evaluation, Education, & procurement, Patient/Caregiver Education & Training, Self-Care / ADL, Neuromuscular Re-education, Cognitive Reorientation  G-Code     OutComes Score                                                  AM-PAC Score             Goals  Short term goals  Time Frame for Short term goals: 3 weeks   Short term goal 1: Functional transfer for ADL completion w/ Min A  Short term goal 2: Bathing w/ Min A   Short term goal 3: UB dressing w/ Min A  Short term goal 4: LB dressing w/ Min A  Short term goal 5: Toileting w/ Min A  Long term goals  Time Frame for Long term goals : LTG=STG  Patient Goals   Patient goals :  \"To go home\"       Therapy Time   Individual Co treatment Group Co-treatment   Time In   0830   1100   Time Out   0900   1130   Minutes   30   30   Timed Code Treatment Minutes: [address] [facility] Michelle Keen, OT

## 2020-04-04 NOTE — PROGRESS NOTES
with altered mental status secondary to UTI. She was admitted to ICU because of hypotension. At this time the patient is combative and confused. According to daughter Sarah Moore does indicate that this is a clear deviation from her baseline. Objective     Sit to Stand: Dependent/Total  Stand to sit: Dependent/Total  Bed to Chair: 2 Person Assistance, Dependent/Total(maxA of 2 with stedy)     OT  PT Equipment Recommendations  Equipment Needed: No  Other: Pt owns w/c at home  Equipment Used: Standard bedside commode  Toilet Transfers Comments: Use of stedy  Assessment        SLP  Current Diet : Dysphagia Pureed (Dysphagia I)  Current Liquid Diet : Moderately Thick (Honey)  Diet Solids Recommendation: Dysphagia Pureed (Dysphagia I)  Liquid Consistency Recommendation: Thin    Body mass index is 31.38 kg/m². Assessment:  Patient Active Problem List   Diagnosis    Hemiplegia, late effect of cerebrovascular disease ([address]    Allergic rhinitis    Irritable bowel syndrome    Peripheral vascular disease ([address]    Cerebrovascular accident (CVA) due to occlusion of cerebral artery ([address]    Occlusion and stenosis of carotid artery with cerebral infarction    Irritable bladder    Overactive bladder    Essential hypertension    Mixed hyperlipidemia    Compression fracture of L1 lumbar vertebra (HCC)    Gastroesophageal reflux disease without esophagitis    Hyponatremia    Renal insufficiency    AMS (altered mental status)    Acute metabolic encephalopathy    UTI (urinary tract infection)    High anion gap metabolic acidosis    Leukocytosis    Atrial fibrillation with rapid ventricular response (HCC)    Dementia (HCC)    Dysphagia    Aspiration pneumonia (HCC)    Acute ischemic stroke ([address]       Plan:   Acute L PCA infarct: Mild petechial hemorrhage. ASA, statin. Xarelto to initiate on 4/14 per neuro.  PT/OT/SLP     Afib w/ RVR: Xarelto as above, cardizem 30     Aspiration PNA: levaquin for 6 days. - Repeat

## 2020-04-05 PROCEDURE — 6370000000 HC RX 637 (ALT 250 FOR IP): Performed by: PHYSICAL MEDICINE & REHABILITATION

## 2020-04-05 PROCEDURE — 1280000000 HC REHAB R&B

## 2020-04-05 RX ADMIN — ASPIRIN 81 MG 81 MG: 81 TABLET ORAL at 08:26

## 2020-04-05 RX ADMIN — POTASSIUM BICARBONATE 40 MEQ: 782 TABLET, EFFERVESCENT ORAL at 08:25

## 2020-04-05 RX ADMIN — DILTIAZEM HYDROCHLORIDE 30 MG: 60 TABLET, FILM COATED ORAL at 14:39

## 2020-04-05 RX ADMIN — DILTIAZEM HYDROCHLORIDE 30 MG: 60 TABLET, FILM COATED ORAL at 20:11

## 2020-04-05 RX ADMIN — DILTIAZEM HYDROCHLORIDE 30 MG: 60 TABLET, FILM COATED ORAL at 06:58

## 2020-04-05 RX ADMIN — LOSARTAN POTASSIUM 100 MG: 100 TABLET, FILM COATED ORAL at 08:26

## 2020-04-05 RX ADMIN — LEVOFLOXACIN 500 MG: 500 TABLET, FILM COATED ORAL at 08:26

## 2020-04-05 RX ADMIN — LANSOPRAZOLE 30 MG: 30 TABLET, ORALLY DISINTEGRATING ORAL at 06:58

## 2020-04-05 RX ADMIN — DESMOPRESSIN ACETATE 40 MG: 0.2 TABLET ORAL at 20:11

## 2020-04-05 ASSESSMENT — PAIN SCALES - GENERAL: PAINLEVEL_OUTOF10: 0

## 2020-04-05 NOTE — PLAN OF CARE
Problem: IP BLADDER/VOIDING  Goal: STG - Patient demonstrates no accidents  Outcome: Ongoing     Problem: IP BOWEL ELIMINATION  Goal: STG - Patient participates in bowel management program  Outcome: Ongoing     Problem: NUTRITION  Goal: Patient maintains adequate hydration  Outcome: Ongoing     Problem: SAFETY  Goal: LTG - Patient will demonstrate safety requirements appropriate to situation/environment  Outcome: Ongoing     Problem: SKIN INTEGRITY  Goal: STG - patient will maintain good skin integrity  Outcome: Ongoing     Problem: SKIN INTEGRITY  Goal: STG - patient demonstrates pressure reduction techniques  Outcome: Ongoing     Problem: PAIN  Goal: LTG - Patient will demonstrate intervention for managing pain  Outcome: Ongoing     Problem: PAIN  Goal: STG - pain is manageable through therapies  Outcome: Ongoing     Problem: Falls - Risk of:  Goal: Will remain free from falls  Description: Will remain free from falls  Outcome: Ongoing     Problem: Falls - Risk of:  Goal: Absence of physical injury  Description: Absence of physical injury  Outcome: Ongoing     Problem: Neurological  Goal: Maximum potential motor/sensory/cognitive function  Outcome: Ongoing     Problem: Nutrition  Goal: Optimal nutrition therapy  Outcome: Ongoing     Problem: Pain:  Goal: Pain level will decrease  Description: Pain level will decrease  Outcome: Ongoing     Problem: Pain:  Goal: Control of acute pain  Description: Control of acute pain  Outcome: Ongoing     Problem: Pain:  Goal: Control of chronic pain  Description: Control of chronic pain  Outcome: Ongoing

## 2020-04-06 ENCOUNTER — APPOINTMENT (OUTPATIENT)
Dept: GENERAL RADIOLOGY | Age: 85
DRG: 056 | End: 2020-04-06
Attending: PHYSICAL MEDICINE & REHABILITATION
Payer: MEDICARE

## 2020-04-06 LAB
ANION GAP SERPL CALCULATED.3IONS-SCNC: 11 MMOL/L (ref 3–16)
BUN BLDV-MCNC: 10 MG/DL (ref 7–20)
CALCIUM SERPL-MCNC: 8.9 MG/DL (ref 8.3–10.6)
CHLORIDE BLD-SCNC: 95 MMOL/L (ref 99–110)
CO2: 25 MMOL/L (ref 21–32)
CREAT SERPL-MCNC: 0.6 MG/DL (ref 0.6–1.2)
GFR AFRICAN AMERICAN: >60
GFR NON-AFRICAN AMERICAN: >60
GLUCOSE BLD-MCNC: 108 MG/DL (ref 70–99)
HCT VFR BLD CALC: 38.3 % (ref 36–48)
HEMOGLOBIN: 12.7 G/DL (ref 12–16)
MAGNESIUM: 1.9 MG/DL (ref 1.8–2.4)
MCH RBC QN AUTO: 31.7 PG (ref 26–34)
MCHC RBC AUTO-ENTMCNC: 33.2 G/DL (ref 31–36)
MCV RBC AUTO: 95.5 FL (ref 80–100)
PDW BLD-RTO: 14.6 % (ref 12.4–15.4)
PLATELET # BLD: 342 K/UL (ref 135–450)
PMV BLD AUTO: 8.6 FL (ref 5–10.5)
POTASSIUM SERPL-SCNC: 4.2 MMOL/L (ref 3.5–5.1)
RBC # BLD: 4.01 M/UL (ref 4–5.2)
SODIUM BLD-SCNC: 131 MMOL/L (ref 136–145)
WBC # BLD: 13.3 K/UL (ref 4–11)

## 2020-04-06 PROCEDURE — 97129 THER IVNTJ 1ST 15 MIN: CPT

## 2020-04-06 PROCEDURE — 80048 BASIC METABOLIC PNL TOTAL CA: CPT

## 2020-04-06 PROCEDURE — 97130 THER IVNTJ EA ADDL 15 MIN: CPT

## 2020-04-06 PROCEDURE — 97530 THERAPEUTIC ACTIVITIES: CPT

## 2020-04-06 PROCEDURE — 6370000000 HC RX 637 (ALT 250 FOR IP): Performed by: PHYSICAL MEDICINE & REHABILITATION

## 2020-04-06 PROCEDURE — 83735 ASSAY OF MAGNESIUM: CPT

## 2020-04-06 PROCEDURE — 1280000000 HC REHAB R&B

## 2020-04-06 PROCEDURE — 97535 SELF CARE MNGMENT TRAINING: CPT

## 2020-04-06 PROCEDURE — 85027 COMPLETE CBC AUTOMATED: CPT

## 2020-04-06 PROCEDURE — 92526 ORAL FUNCTION THERAPY: CPT

## 2020-04-06 PROCEDURE — 36415 COLL VENOUS BLD VENIPUNCTURE: CPT

## 2020-04-06 PROCEDURE — 71045 X-RAY EXAM CHEST 1 VIEW: CPT

## 2020-04-06 RX ORDER — BACLOFEN 10 MG/1
5 TABLET ORAL 3 TIMES DAILY
Status: DISCONTINUED | OUTPATIENT
Start: 2020-04-06 | End: 2020-04-15

## 2020-04-06 RX ADMIN — DILTIAZEM HYDROCHLORIDE 30 MG: 60 TABLET, FILM COATED ORAL at 13:34

## 2020-04-06 RX ADMIN — LEVOFLOXACIN 500 MG: 500 TABLET, FILM COATED ORAL at 08:42

## 2020-04-06 RX ADMIN — BACLOFEN 5 MG: 10 TABLET ORAL at 13:34

## 2020-04-06 RX ADMIN — DILTIAZEM HYDROCHLORIDE 30 MG: 60 TABLET, FILM COATED ORAL at 08:40

## 2020-04-06 RX ADMIN — LOSARTAN POTASSIUM 100 MG: 100 TABLET, FILM COATED ORAL at 08:40

## 2020-04-06 RX ADMIN — BACLOFEN 5 MG: 10 TABLET ORAL at 20:19

## 2020-04-06 RX ADMIN — ASPIRIN 81 MG 81 MG: 81 TABLET ORAL at 08:40

## 2020-04-06 RX ADMIN — DILTIAZEM HYDROCHLORIDE 30 MG: 60 TABLET, FILM COATED ORAL at 20:19

## 2020-04-06 RX ADMIN — DILTIAZEM HYDROCHLORIDE 30 MG: 60 TABLET, FILM COATED ORAL at 02:26

## 2020-04-06 RX ADMIN — POTASSIUM BICARBONATE 40 MEQ: 782 TABLET, EFFERVESCENT ORAL at 08:42

## 2020-04-06 RX ADMIN — DESMOPRESSIN ACETATE 40 MG: 0.2 TABLET ORAL at 20:19

## 2020-04-06 RX ADMIN — LANSOPRAZOLE 30 MG: 30 TABLET, ORALLY DISINTEGRATING ORAL at 05:55

## 2020-04-06 ASSESSMENT — PAIN SCALES - GENERAL: PAINLEVEL_OUTOF10: 0

## 2020-04-06 NOTE — PROGRESS NOTES
Speech Language Pathology  ACUTE REHAB UNIT  SPEECH/LANGUAGE PATHOLOGY      [x] Daily  [] Weekly Care Conference Note  [] Discharge    Kevin Padilla     :11/3/1933  RPM:[id number]  Room #: CCL-9027/4522-72   Rehab Dx/Hx: Acute ischemic stroke [facility]) [I63.9]  Date of Admit: 2020      Precautions: falls and aspirations  Home situation: Live with  and daughter  ST Dx: moderate to severe cognitive linguistic deficits, mild dysarthria, moderate oropharyngeal dysphagia    Daily Treatment Info:   Date: 2020   Tx session 1 Tx session 2   Pain Denied  Denied    Subjective     Pt up in chair. Flat affect persists. Pt required extensive time for eating. Pt in bed. Requested SLP to return later. Upon re -entry required repositioning assistance with RN for upright posture. Flat affect persists but pt generally agreeable. Objective:  Goals     Pt will tolerate soft solids with no overt s/s of aspiration, noticeable fatigue, significant pocketing, and minimized anterior bolus loss. Pt required 25 minutes to consume less than 50% of meal. Pt reported she has always been a slow eater despite cues to correlate slow movement and oral weakness related to CVA. Pt with minimal anterior loss of puree solids during meal.     Lingual lateralization against minimal resistance x10. Required mod verbal cues    Soft solids were trailed (peaches and crushed filiberto cracker in pudding)  - pt with moderately prolonged a-p propulsion   - tongue pumping and intermittent belching noted   - eventual anterior loss of secretions mixed with a small amount of residual pudding, pt was able to sense the spillage and cleared.  Drooling improved with cued swallow       Oropharyngeal strengthening   - Pudding via straw x12, mod-max cues   - CTAR x10, mod -max cues   - pt required encouragement to complete exercises throughout        The patient will tolerate recommended diet without observed clinical signs of moderate to severe cognitive linguistic deficits in the domains of judgement, insight into deficits, working and short term memory, and attention. Pt with history of dementia and has 24/7 supervision-assistance from family. Speech Diagnosis: Pt presents with mild dysarthria due to left perioral weakness. Conversational intelligibility was mildly reduced and also impacted by wet vocal quality after intake of solids. Current Diet Order: DIET DYSPHAGIA PUREED; Dietary Nutrition Supplements: Frozen Oral Supplement   Recommended Form of Meds: Crushed in puree as able(Pt reported she does not like the taste of medications )  Compensatory Swallowing Strategies: Small bites/sips, Assist feed, Remain upright for 30-45 minutes after meals, Upright as possible for all oral intake, Eat/Feed slowly     Plan:   Speech therapy 5 days wk/ 60 minutes per day    Frequency:  5days/week   60 minutes/day    Discharge Recommendations:   Barriers: cognition, awareness, insight, physical limitations   Discharge Recommendations:  [] Home independently  [x] Home with assistance []  24 hour supervision  [] SNF [] Other:  Continued SLP Treatment:  [x] Yes [] No [] TBD based on progress while on ARU [] Vital Stim indicated [] Other:   Estimated discharge date: TBD    Type of Total Treatment Minutes   Session 1   Session 2   Time In 0800 1130   Time Out 0830 1200   Timed Code Minutes  10 12   Individual Treatment Minutes  30 30   Co-Treatment Minutes      Group Treatment Minutes      Concurrent Treatment Minutes        TOTAL DAILY MINUTES:  60 minutes    Electronically Signed by     Ginger Castle M.A.  CCC-SLP BRENT [id number]  Speech-Language Pathologist [phone]  4/6/2020 10:58 AM

## 2020-04-06 NOTE — PROGRESS NOTES
Orthoses?: No  Position Activity Restriction  Other position/activity restrictions: 80 y.o. female patient presenting by EMS from home. Patient lives with her daughter and son-in-law. Patient in usual state of health on Tuesday. Yesterday morning, Wednesday she woke with altered mental status which has progressed. Phone call to PCP indicating concern for UTI. Requested UA. UA not obtained. Cipro initiated. I did speak with eulogio Otoole at [phone] who indicated she had similar presentation in October 1, 2017 at which time she was admitted with altered mental status secondary to UTI. She was admitted to ICU because of hypotension. At this time the patient is combative and confused. According to eulogio Otoole does indicate that this is a clear deviation from her baseline. Subjective   General  Chart Reviewed: Yes  Patient assessed for rehabilitation services?: Yes  Additional Pertinent Hx: Per Geri Lobo MD: Pt is \"80 y.o. female with PMH significant for dementia, CVA with left-sided weakness, PVD, hypertension, hyperlipidemia who presents to [facility] with acute encephalopathy. Per family patient was becoming more confused over the last day. Spoke with PCP who recommended starting Cipro as she was unable to come in for urinalysis and they were unable to obtain at home. Patient unable to give any history. Spoke with  eulogio Otoole over the phone, says that they started noticing change in mental status a few days ago. They had tried to get a UA but were not able to and she was started empirically on cipro yesterday. This morning was more confused and fell while trying to get into wheelchair, hitting side of her head. No fevers at home, no cough or SOB and not endorsing any pain or localizing symptoms. Normally knows where she is and what is going on.  \"  Response to previous treatment: Patient with no complaints from previous session  Family / Caregiver Present: No  Referring Practitioner:

## 2020-04-06 NOTE — PROGRESS NOTES
Viviana Prader  4/6/2020  [id number]    Chief Complaint: Acute ischemic stroke ([address]    Subjective:   No overnight events. No current complaints. Nursing reporting spasms observed that are causing significant discomfort. No recent documentation of spasticity management in care everywhere. Leukocytosis present. No F/C.    ROS: No CP, SOB, dyspnea    Objective:  Patient Vitals for the past 24 hrs:   BP Temp Temp src Pulse Resp SpO2 Weight   04/06/20 0600 -- -- -- -- -- -- 147 lb 6.4 oz (66.9 kg)   04/06/20 0226 (!) 172/79 -- -- -- -- -- --   04/05/20 2000 (!) 160/83 98.6 °F (37 °C) Oral 77 16 97 % --   04/05/20 1718 138/81 -- -- -- -- -- --   04/05/20 1439 109/87 -- -- -- -- -- --     Gen: No distress, pleasant. Resting in WC  HEENT: Normocephalic, atraumatic. CV: Regular rate and rhythm. No MRG   Resp: No respiratory distress. Coarse with diminished BS at LLL  Abd: Soft, nontender nondistended  Ext: No edema. Neuro: Alert, oriented, appropriately interactive. Dysarthria. RUE 4/5 diffusely RLE 4/5 LUE contracted with 0/5 strength and unable to fully abduct shoulder, extend elbow, or extend fingers and wrist due to pain, LLE 2/5    Laboratory data: Available via EMR. Therapy progress:  PT  Position Activity Restriction  Other position/activity restrictions: 80 y.o. female patient presenting by EMS from home. Patient lives with her daughter and son-in-law. Patient in usual state of health on Tuesday. Yesterday morning, Wednesday she woke with altered mental status which has progressed. Phone call to PCP indicating concern for UTI. Requested UA. UA not obtained. Cipro initiated. I did speak with [facility] at [phone] who indicated she had similar presentation in October 1, 2017 at which time she was admitted with altered mental status secondary to UTI. She was admitted to ICU because of hypotension. At this time the patient is combative and confused.   According to [facility] does indicate

## 2020-04-07 PROBLEM — E44.0 MODERATE MALNUTRITION (HCC): Chronic | Status: ACTIVE | Noted: 2020-04-07

## 2020-04-07 LAB
HCT VFR BLD CALC: 40.5 % (ref 36–48)
HEMOGLOBIN: 13.6 G/DL (ref 12–16)
MCH RBC QN AUTO: 31.8 PG (ref 26–34)
MCHC RBC AUTO-ENTMCNC: 33.5 G/DL (ref 31–36)
MCV RBC AUTO: 95 FL (ref 80–100)
PDW BLD-RTO: 14.4 % (ref 12.4–15.4)
PLATELET # BLD: 319 K/UL (ref 135–450)
PMV BLD AUTO: 8.7 FL (ref 5–10.5)
RBC # BLD: 4.26 M/UL (ref 4–5.2)
WBC # BLD: 14.9 K/UL (ref 4–11)

## 2020-04-07 PROCEDURE — 6370000000 HC RX 637 (ALT 250 FOR IP): Performed by: PHYSICAL MEDICINE & REHABILITATION

## 2020-04-07 PROCEDURE — 97112 NEUROMUSCULAR REEDUCATION: CPT

## 2020-04-07 PROCEDURE — 2580000003 HC RX 258: Performed by: PHYSICAL MEDICINE & REHABILITATION

## 2020-04-07 PROCEDURE — 97535 SELF CARE MNGMENT TRAINING: CPT

## 2020-04-07 PROCEDURE — 1280000000 HC REHAB R&B

## 2020-04-07 PROCEDURE — 97129 THER IVNTJ 1ST 15 MIN: CPT

## 2020-04-07 PROCEDURE — 97530 THERAPEUTIC ACTIVITIES: CPT

## 2020-04-07 PROCEDURE — 92507 TX SP LANG VOICE COMM INDIV: CPT

## 2020-04-07 PROCEDURE — 87641 MR-STAPH DNA AMP PROBE: CPT

## 2020-04-07 PROCEDURE — 85027 COMPLETE CBC AUTOMATED: CPT

## 2020-04-07 PROCEDURE — 92526 ORAL FUNCTION THERAPY: CPT

## 2020-04-07 PROCEDURE — 36415 COLL VENOUS BLD VENIPUNCTURE: CPT

## 2020-04-07 PROCEDURE — 6360000002 HC RX W HCPCS: Performed by: PHYSICAL MEDICINE & REHABILITATION

## 2020-04-07 RX ADMIN — BACLOFEN 5 MG: 10 TABLET ORAL at 12:12

## 2020-04-07 RX ADMIN — DESMOPRESSIN ACETATE 40 MG: 0.2 TABLET ORAL at 21:07

## 2020-04-07 RX ADMIN — BACLOFEN 5 MG: 10 TABLET ORAL at 09:00

## 2020-04-07 RX ADMIN — LANSOPRAZOLE 30 MG: 30 TABLET, ORALLY DISINTEGRATING ORAL at 06:14

## 2020-04-07 RX ADMIN — DILTIAZEM HYDROCHLORIDE 30 MG: 60 TABLET, FILM COATED ORAL at 09:00

## 2020-04-07 RX ADMIN — LEVOFLOXACIN 500 MG: 500 TABLET, FILM COATED ORAL at 09:00

## 2020-04-07 RX ADMIN — PIPERACILLIN AND TAZOBACTAM 3.38 G: 3; .375 INJECTION, POWDER, LYOPHILIZED, FOR SOLUTION INTRAVENOUS at 09:25

## 2020-04-07 RX ADMIN — BACLOFEN 5 MG: 10 TABLET ORAL at 05:00

## 2020-04-07 RX ADMIN — ASPIRIN 81 MG 81 MG: 81 TABLET ORAL at 09:00

## 2020-04-07 RX ADMIN — DILTIAZEM HYDROCHLORIDE 30 MG: 60 TABLET, FILM COATED ORAL at 12:13

## 2020-04-07 RX ADMIN — PIPERACILLIN AND TAZOBACTAM 3.38 G: 3; .375 INJECTION, POWDER, LYOPHILIZED, FOR SOLUTION INTRAVENOUS at 16:36

## 2020-04-07 RX ADMIN — DILTIAZEM HYDROCHLORIDE 30 MG: 60 TABLET, FILM COATED ORAL at 21:09

## 2020-04-07 RX ADMIN — LOSARTAN POTASSIUM 100 MG: 100 TABLET, FILM COATED ORAL at 09:00

## 2020-04-07 RX ADMIN — DILTIAZEM HYDROCHLORIDE 30 MG: 60 TABLET, FILM COATED ORAL at 04:17

## 2020-04-07 RX ADMIN — POTASSIUM BICARBONATE 40 MEQ: 782 TABLET, EFFERVESCENT ORAL at 09:00

## 2020-04-07 NOTE — PROGRESS NOTES
and family. Goal not targeted this session. Targeted indirectly via hyolaryngeal elevation and vocal closure exercises   - high pitch /e/ x10   - low pitch /o/ x10   - MPT x10 with a max of 6 seconds given max verbal cues      The patient will demonstrate improved self awareness and awareness of deficits related to CVA and impact on daily function. Pt required mod cues to correlate dysphagia to CVA. Goal not targeted this session. Pt will complete short term memory tasks and recall fx information for improved recall and carryover of information from day to day. Goal not targeted this session. Goal not targeted this session. Other areas targeted: Functional problem solving questions re medication safety   - 2/3, pt with minimal responses, 1-2 word utterance lengths, eventually did not respond to remaining 2 questions provided   - mod-max cues for rationale of responses in attempt to expand thought/utterance length       Education:   Pt educated re: dysphagia strategies and SLP POC Ongoing education re dysphagia, concern for aspiration, and diet recommendations. Pt verbalized understanding but requires ongoing education and reinforcement. Safety Devices: [x] Call light within reach  [x] Chair alarm activated  [] Bed alarm activated  [] Other: [x] Call light within reach  [] Chair alarm activated  [x] Bed alarm activated  [] Other:      Assessment:   Speech Therapy Diagnosis  Cognitive Diagnosis: Pt presents with moderate to severe cognitive linguistic deficits in the domains of judgement, insight into deficits, working and short term memory, and attention. Pt with history of dementia and has 24/7 supervision-assistance from family. Speech Diagnosis: Pt presents with mild dysarthria due to left perioral weakness. Conversational intelligibility was mildly reduced and also impacted by wet vocal quality after intake of solids. Current Diet Order: DIET DYSPHAGIA PUREED;   Dietary Nutrition Supplements: Frozen Oral Supplement   Recommended Form of Meds: Crushed in puree as able(Pt reported she does not like the taste of medications )  Compensatory Swallowing Strategies: Small bites/sips, Assist feed, Remain upright for 30-45 minutes after meals, Upright as possible for all oral intake, Eat/Feed slowly     Plan:   Speech therapy 5 days wk/ 60 minutes per day    Frequency:  5days/week   60 minutes/day    Discharge Recommendations:   Barriers: cognition, awareness, insight, physical limitations   Discharge Recommendations:  [] Home independently  [x] Home with assistance []  24 hour supervision  [] SNF [] Other:  Continued SLP Treatment:  [x] Yes [] No [] TBD based on progress while on ARU [] Vital Stim indicated [] Other:   Estimated discharge date: TBD    Type of Total Treatment Minutes   Session 1   Session 2   Time In 0830 1030/1120   Time Out 0900 1035/1145   Timed Code Minutes  10    Individual Treatment Minutes  30 30   Co-Treatment Minutes      Group Treatment Minutes      Concurrent Treatment Minutes        TOTAL DAILY MINUTES:  60 minutes    Electronically Signed by     Aida Geronimo M.A.  CCC-SLP S.P. [id number]  Speech-Language Pathologist [phone]  4/7/2020 10:07 AM

## 2020-04-07 NOTE — PROGRESS NOTES
None  · Current Nutrition Therapies:  · Oral Diet Orders: Dysphagia Pureed (Dysphagia 1), No Straws   · Oral Diet intake: 1-25%, 26-50%  · Oral Nutrition Supplement (ONS) Orders: Frozen Oral Supplement  · ONS intake: 26-50%, 51-75%  · Anthropometric Measures:  · Ht: 5' 1\" (154.9 cm)   · Current Body Wt: 147 lb (66.7 kg)  · Admission Body Wt: 154 lb (69.9 kg)(4/1)  · % Weight Change:  ,  7 lb / 5% loss in 1 week (if bedscales accurate)  · Ideal Body Wt: 105 lb (47.6 kg),  · BMI Classification: BMI 25.0 - 29.9 Overweight    Nutrition Interventions:   Continue current diet, Continue current ONS, Start Calorie Count  Continued Inpatient Monitoring, Education Not Indicated    Nutrition Evaluation:   · Evaluation: No progress toward goals   · Goals: po intake at least 50% of meals & supplements    · Monitoring: Meal Intake, Supplement Intake, Diet Tolerance, Weight, Chewing/Swallowing      Electronically signed by Kathy Wadsworth RD, LD on 4/7/20 at 11:25 AM EDT    Contact Number: [phone]

## 2020-04-07 NOTE — PROGRESS NOTES
Patient with no complaints from previous session  Family / Caregiver Present: No  Referring Practitioner: Aliyah Urbina MD  Diagnosis: Altered mental status  Subjective  Subjective: Pt supine in bed upon entry, agreeable to OT/PT cotx w/ encouragement   Vital Signs  Patient Currently in Pain: Denies     Objective    ADL  Grooming: Minimal assistance(oral care/combing hair while seated at sink )  UE Dressing: Maximum assistance(to don gown)  Toileting: Dependent/Total(to don brief)  Additional Comments: SPT>w/c. Pt completed dressing w/ Max assist while seated in w/c. Second person assist for standing balance while this writer directed ADLs. Pt then incontinent of urine. SPT>commode. Pt voided urine. This writer doffed soiled clothing, donned new brief. Pt completed grooming tasks while seated in w/c at sink      Balance  Sitting Balance: Contact guard assistance  Standing Balance: Maximum assistance  Standing Balance  Time: ~15 min in standing frame  Activity: Stance in standing frame  Comment: Pt completed cervical flexion/extension stretching, lateral stretching. Holding head at midline. Pt fatigues quickly w/ activity, continues to demo R preferential gaze, R inattention throughout sessions. Toilet Transfers  Toilet - Technique: Stand pivot  Equipment Used: Standard bedside commode  Toilet Transfer: 2 Person assistance  Toilet Transfers Comments: Max A of 1 + second person assist for clothing management   Bed mobility  Supine to Sit: Moderate assistance  Transfers  Sit to stand: Maximum assistance  Stand to sit: Maximum assistance      Cognition  Overall Cognitive Status: Exceptions  Arousal/Alertness: Delayed responses to stimuli  Following Commands:  Follows one step commands with increased time  Attention Span: Difficulty attending to directions  Memory: Decreased recall of recent events;Decreased short term memory  Safety Judgement: Decreased awareness of need for assistance;Decreased awareness of need

## 2020-04-07 NOTE — PROGRESS NOTES
Physical Therapy  Facility/Department: [facility] ACUTE REHAB UNIT  Daily Treatment Note  NAME: Edmund Bowles  : 11/3/1933  MRN: [id number]    Date of Service: 2020    Discharge Recommendations:  24 hour supervision or assist, Home with Home health PT, S Level 3   PT Equipment Recommendations  Equipment Needed: No  Other: Pt owns w/c at home    Assessment   Body structures, Functions, Activity limitations: Decreased functional mobility ; Decreased endurance;Decreased cognition;Decreased strength;Decreased ROM; Decreased balance;Decreased posture  Assessment: Pt with improvement of BLE alignment in standing frame. Pt with improvement in stand pivot transfers, but continues to require significant assistance with altered tone pattern of LLE. Treatment Diagnosis: impaired mobility and balance  Prognosis: Fair  PT Education: Transfer Training;Functional Mobility Training;Pressure Relief;Goals;PT Role;Plan of Care;Weight-bearing Education  Patient Education: Pt requires reinforcement of education. Barriers to Learning: cognition  REQUIRES PT FOLLOW UP: Yes  Activity Tolerance  Activity Tolerance: Patient limited by fatigue;Patient limited by endurance     Patient Diagnosis(es): CVA. has a past medical history of Cerebral artery occlusion with cerebral infarction [facility]), Cerebral vascular disease, Hyperlipidemia, Hypertension, Peripheral vascular disease ([facility] [address] and Urinary incontinence. has a past surgical history that includes  section; Hysterectomy; and Carotid endarterectomy (Right, 2005). Restrictions  Restrictions/Precautions  Restrictions/Precautions: Fall Risk  Required Braces or Orthoses?: No  Position Activity Restriction  Other position/activity restrictions: 80 y.o. female patient presenting by EMS from home. Patient lives with her daughter and son-in-law. Patient in usual state of health on Tuesday.   Yesterday morning, Wednesday she woke with altered mental status which has Performed unsupported sitting and reaching with increased assist required when reaching L with significant vc to increase tracking to the L. Pt crossed midline for cone stacking X 6 reps R and L. Pt returned to room and remained in w/c with alarm on and all needs in reach.               G-Code     OutComes Score                                                     AM-PAC Score             Goals  Short term goals  Time Frame for Short term goals: 1-2 weeks  Short term goal 1: Perform all bed mobility with Adriana  Short term goal 2: Perform all transfers mod A  Short term goal 3: Propel w/c 25' with Adriana  Long term goals  Time Frame for Long term goals : 2-3 weeks  Long term goal 1: Perform all bed mobility SBA  Long term goal 2: Perform all transfers SBA  Long term goal 3: Propel w/c 48' with supervision  Patient Goals   Patient goals : \"to transfer on my own again\"    Plan    Plan  Times per week: 60 minutes a day 5 days a week  Current Treatment Recommendations: Functional Mobility Training, Neuromuscular Re-education, Balance Training, Strengthening, Transfer Training, Wheelchair Mobility Training, Manual Therapy - Soft Tissue Mobilization, Endurance Training, Safety Education & Training, Positioning, Equipment Evaluation, Education, & procurement, ROM  Safety Devices  Type of devices: Call light within reach, Nurse notified, Gait belt, All fall risk precautions in place, Chair alarm in place, Left in chair  Restraints  Initially in place: No     Therapy Time   Individual Concurrent Group Co-treatment   Time In       0730   Time Out       0830   Minutes       60         Timed Code Treatment Minutes:  60    Total Treatment Minutes:  [address], [address] [place], [place] [id number]

## 2020-04-07 NOTE — PLAN OF CARE
Problem: Pain:  Description: Pain management should include both nonpharmacologic and pharmacologic interventions. Goal: Control of acute pain  Description: Control of acute pain  4/6/2020 2009 by Nitesh Upton RN  Outcome: Ongoing  4/6/2020 1054 by Baldo Anthony RN  Outcome: Ongoing     Problem: Pain:  Description: Pain management should include both nonpharmacologic and pharmacologic interventions.   Goal: Control of chronic pain  Description: Control of chronic pain  4/6/2020 2009 by Nitesh Upton RN  Outcome: Ongoing  4/6/2020 1054 by Baldo Anthony RN  Outcome: Ongoing

## 2020-04-08 LAB
ANION GAP SERPL CALCULATED.3IONS-SCNC: 10 MMOL/L (ref 3–16)
BUN BLDV-MCNC: 14 MG/DL (ref 7–20)
CALCIUM SERPL-MCNC: 9 MG/DL (ref 8.3–10.6)
CHLORIDE BLD-SCNC: 95 MMOL/L (ref 99–110)
CO2: 26 MMOL/L (ref 21–32)
CREAT SERPL-MCNC: 0.7 MG/DL (ref 0.6–1.2)
GFR AFRICAN AMERICAN: >60
GFR NON-AFRICAN AMERICAN: >60
GLUCOSE BLD-MCNC: 110 MG/DL (ref 70–99)
HCT VFR BLD CALC: 37.1 % (ref 36–48)
HEMOGLOBIN: 12.4 G/DL (ref 12–16)
MAGNESIUM: 2 MG/DL (ref 1.8–2.4)
MCH RBC QN AUTO: 31.9 PG (ref 26–34)
MCHC RBC AUTO-ENTMCNC: 33.5 G/DL (ref 31–36)
MCV RBC AUTO: 95.1 FL (ref 80–100)
MRSA SCREEN RT-PCR: NORMAL
PDW BLD-RTO: 14.7 % (ref 12.4–15.4)
PLATELET # BLD: 377 K/UL (ref 135–450)
PMV BLD AUTO: 8.2 FL (ref 5–10.5)
POTASSIUM SERPL-SCNC: 4.4 MMOL/L (ref 3.5–5.1)
RBC # BLD: 3.9 M/UL (ref 4–5.2)
SODIUM BLD-SCNC: 131 MMOL/L (ref 136–145)
WBC # BLD: 11.7 K/UL (ref 4–11)

## 2020-04-08 PROCEDURE — 97130 THER IVNTJ EA ADDL 15 MIN: CPT

## 2020-04-08 PROCEDURE — 6360000002 HC RX W HCPCS: Performed by: PHYSICAL MEDICINE & REHABILITATION

## 2020-04-08 PROCEDURE — 97129 THER IVNTJ 1ST 15 MIN: CPT

## 2020-04-08 PROCEDURE — 94760 N-INVAS EAR/PLS OXIMETRY 1: CPT

## 2020-04-08 PROCEDURE — 97530 THERAPEUTIC ACTIVITIES: CPT

## 2020-04-08 PROCEDURE — 92526 ORAL FUNCTION THERAPY: CPT

## 2020-04-08 PROCEDURE — 36415 COLL VENOUS BLD VENIPUNCTURE: CPT

## 2020-04-08 PROCEDURE — 80048 BASIC METABOLIC PNL TOTAL CA: CPT

## 2020-04-08 PROCEDURE — 97535 SELF CARE MNGMENT TRAINING: CPT

## 2020-04-08 PROCEDURE — 6370000000 HC RX 637 (ALT 250 FOR IP): Performed by: PHYSICAL MEDICINE & REHABILITATION

## 2020-04-08 PROCEDURE — 2580000003 HC RX 258: Performed by: PHYSICAL MEDICINE & REHABILITATION

## 2020-04-08 PROCEDURE — 83735 ASSAY OF MAGNESIUM: CPT

## 2020-04-08 PROCEDURE — 85027 COMPLETE CBC AUTOMATED: CPT

## 2020-04-08 PROCEDURE — 1280000000 HC REHAB R&B

## 2020-04-08 RX ADMIN — SERTRALINE 25 MG: 50 TABLET, FILM COATED ORAL at 13:51

## 2020-04-08 RX ADMIN — BACLOFEN 5 MG: 10 TABLET ORAL at 08:59

## 2020-04-08 RX ADMIN — ASPIRIN 81 MG 81 MG: 81 TABLET ORAL at 08:59

## 2020-04-08 RX ADMIN — Medication 10 ML: at 21:55

## 2020-04-08 RX ADMIN — PIPERACILLIN AND TAZOBACTAM 3.38 G: 3; .375 INJECTION, POWDER, LYOPHILIZED, FOR SOLUTION INTRAVENOUS at 02:30

## 2020-04-08 RX ADMIN — DILTIAZEM HYDROCHLORIDE 30 MG: 60 TABLET, FILM COATED ORAL at 20:46

## 2020-04-08 RX ADMIN — POTASSIUM BICARBONATE 40 MEQ: 782 TABLET, EFFERVESCENT ORAL at 08:59

## 2020-04-08 RX ADMIN — DILTIAZEM HYDROCHLORIDE 30 MG: 60 TABLET, FILM COATED ORAL at 03:23

## 2020-04-08 RX ADMIN — DESMOPRESSIN ACETATE 40 MG: 0.2 TABLET ORAL at 20:46

## 2020-04-08 RX ADMIN — Medication 10 ML: at 09:54

## 2020-04-08 RX ADMIN — BACLOFEN 5 MG: 10 TABLET ORAL at 13:52

## 2020-04-08 RX ADMIN — DILTIAZEM HYDROCHLORIDE 30 MG: 60 TABLET, FILM COATED ORAL at 08:59

## 2020-04-08 RX ADMIN — LANSOPRAZOLE 30 MG: 30 TABLET, ORALLY DISINTEGRATING ORAL at 06:32

## 2020-04-08 RX ADMIN — LOSARTAN POTASSIUM 100 MG: 100 TABLET, FILM COATED ORAL at 08:59

## 2020-04-08 RX ADMIN — PIPERACILLIN AND TAZOBACTAM 3.38 G: 3; .375 INJECTION, POWDER, LYOPHILIZED, FOR SOLUTION INTRAVENOUS at 17:28

## 2020-04-08 RX ADMIN — BACLOFEN 5 MG: 10 TABLET ORAL at 20:46

## 2020-04-08 RX ADMIN — PIPERACILLIN AND TAZOBACTAM 3.38 G: 3; .375 INJECTION, POWDER, LYOPHILIZED, FOR SOLUTION INTRAVENOUS at 09:54

## 2020-04-08 RX ADMIN — DILTIAZEM HYDROCHLORIDE 30 MG: 60 TABLET, FILM COATED ORAL at 13:51

## 2020-04-08 ASSESSMENT — PAIN SCALES - GENERAL: PAINLEVEL_OUTOF10: 0

## 2020-04-08 NOTE — PROGRESS NOTES
Calorie Count Note    Type and Reason for Visit: Reassess    Current diet and supplement order:  Dietary Nutrition Supplements: Frozen Oral Supplement  DIET DYSPHAGIA PUREED; Mildly Thick (Nectar)        Comparative Standards (Estimated Nutrition Needs):   · Estimated Daily Total Kcal: 4272-3657 1910  · Estimated Daily Protein (g): 62- 72(1.3-1.5) 67 gms    Date Consumed PO Intake Kcal %   Kcal met PO Intake grams protein %  Protein met   Comments   4/7 397 25% 9.25 14%    4/8 360  7 gms  Only bkfst                                   **Results will be posted as available. Intervention & Recommendations:   1. Continue Calorie Count  2.   Continue Magic cups    Electronically signed by Cathlean Opitz, RD, CNSC, LD on 4/8/20 at 10:45 AM EDT    Contact Number: [phone]

## 2020-04-08 NOTE — PROGRESS NOTES
1030   Time Out 0830 1100   Timed Code Minutes  8 15   Individual Treatment Minutes  30 30   Co-Treatment Minutes      Group Treatment Minutes      Concurrent Treatment Minutes        TOTAL DAILY MINUTES:  60 minutes    Electronically Signed by     Leonardo Renner M.A.  CCC-SLP BRENT [id number]  Speech-Language Pathologist [phone]  4/8/2020 9:08 AM

## 2020-04-08 NOTE — PROGRESS NOTES
Spoke with pt's daughter this date regarding pt's PLOF. Prior to this hospitalization pt was able to get out of bed independently and transfer self to w/c. Pt required intermittent assist w/ toileting, dependent for ADL completion. Notified pt's daughter that pt is having difficulty interacting with therapy staff at this time. Pt's daughter requested that she be able to visit pt in hospital in order to boost pt's mood. Discussed visitation w/ nursing staff and treatment team is in agreement that pt would benefit from daughter visiting in order to improve mood for participation in therapy and encourage nutritional intake. Educated pt's daughter that she will be required to wear a mask at all times and will be limited to the pt's room, pt's daughter verbalized understanding.      Thanks,  Estela Archer OTR/L, MOT #[id number]

## 2020-04-08 NOTE — PROGRESS NOTES
Occupational Therapy  Facility/Department: [facility] ACUTE REHAB UNIT  Daily Treatment Note  NAME: Bj Krishnan  : 11/3/1933  MRN: [id number]    Date of Service: 2020    Discharge Recommendations:  Continue to assess pending progress, Patient would benefit from continued therapy after discharge  OT Equipment Recommendations  Equipment Needed: No  Other: Continue to assess pending pt progress    Assessment   Performance deficits / Impairments: Decreased functional mobility ; Decreased strength;Decreased endurance;Decreased ADL status; Decreased safe awareness;Decreased cognition;Decreased balance;Decreased fine motor control;Decreased posture;Decreased coordination  Assessment: Pt presents w/ the above deficits which are impacting her occupational performance. Pt would benefit from continued therapy during inpatient stay in order to maximize safety and independence upon discharge. Treatment Diagnosis: Multiple performance deficits associated w/ CVA  OT Education: OT Role;Plan of Care;ADL Adaptive Strategies;Transfer Training;Equipment  Patient Education: Pt verbalized understanding but will require continued reinforcement to promote carryover  Barriers to Learning: Cognition, motivation   REQUIRES OT FOLLOW UP: Yes  Activity Tolerance  Activity Tolerance: Patient limited by fatigue  Activity Tolerance: Pt required significant encouragement to participate in therapy. Pt w/ task avoidance behavior throughout session, will not make eye contact w/ this writer, will not verbalize wants/needs  Safety Devices  Safety Devices in place: Yes  Type of devices: Call light within reach;Nurse notified; Left in chair;Chair alarm in place;Gait belt         Patient Diagnosis(es): CVA   has a past medical history of Cerebral artery occlusion with cerebral infarction [facility]), Cerebral vascular disease, Hyperlipidemia, Hypertension, Peripheral vascular disease ([facility] [address] and Urinary incontinence.    has a past surgical history that Requires cues for all  Sequencing: Requires cues for all        Second Session: Pt supine in bed upon entry, agreeable to OT w/ encouragement. Pt denies pain, requesting to use commode. Supine>sit=Max A. Use of stedy d/t urinary urgency. Mod A sit> stedy. Toileting completed at dependent level. Pt's brief saturated w/ urine. Dependent for LB dressing. Pt combed hair w/ Min A. Pt transported to gym for further activity. Pt completed 10 min in standing frame focusing on upright posture and head turns. Pt demoed decreased ability to maintain attention to task on L side. Prolonged stretch of RUE to facilitate upright posture. Pt transported back to room, set up w/ lunch, pt continues to refuse to eat majority of meals. Daughter called on pt's room phone per pt request. Pt seated in chair at end of session, chair alarm on, needs within reach. Plan   Plan  Times per week: 60 min; 5-7x week   Times per day: Daily  Current Treatment Recommendations: Functional Mobility Training, Strengthening, Endurance Training, Balance Training, Safety Education & Training, Equipment Evaluation, Education, & procurement, Patient/Caregiver Education & Training, Self-Care / ADL, Neuromuscular Re-education, Cognitive Reorientation    Goals  Short term goals  Time Frame for Short term goals: 3 weeks   Short term goal 1: Functional transfer for ADL completion w/ Min A  Short term goal 2: Bathing w/ Min A   Short term goal 3: UB dressing w/ Min A  Short term goal 4: LB dressing w/ Min A  Short term goal 5: Toileting w/ Min A  Long term goals  Time Frame for Long term goals : LTG=STG  Patient Goals   Patient goals :  \"To go home\"       Therapy Time   Individual Concurrent Group Co-treatment   Time In  8193     1773, 3974   Time Out  1325     0945, 1320   Minutes  5     30, 35        Timed Code Treatment Minutes:  30 + 40   Total Treatment Minutes:  70 minutes          SARAH Mariano OTR/L, [facility] #[id number]

## 2020-04-08 NOTE — PLAN OF CARE
Problem: IP BLADDER/VOIDING  Goal: STG - Patient demonstrates no accidents  Outcome: Ongoing     Problem: IP BOWEL ELIMINATION  Goal: STG - Patient participates in bowel management program  Outcome: Ongoing     Problem: NUTRITION  Description: Altered Nutrition and Hydration  Goal: Patient maintains adequate hydration  Outcome: Ongoing     Problem: SAFETY  Goal: LTG - Patient will demonstrate safety requirements appropriate to situation/environment  4/8/2020 1043 by Dali Ochoa RN  Outcome: Ongoing  4/7/2020 2044 by Betsy Brown RN  Outcome: Ongoing     Problem: SKIN INTEGRITY  Goal: STG - patient will maintain good skin integrity  Outcome: Ongoing     Problem: Falls - Risk of:  Goal: Will remain free from falls  Description: Will remain free from falls  Outcome: Ongoing     Problem: Nutrition  Goal: Optimal nutrition therapy  Outcome: Ongoing

## 2020-04-08 NOTE — PROGRESS NOTES
which has progressed. Phone call to PCP indicating concern for UTI. Requested UA. UA not obtained. Cipro initiated. I did speak with daughter Dajuan Amesr at [phone] who indicated she had similar presentation in October 1, 2017 at which time she was admitted with altered mental status secondary to UTI. She was admitted to ICU because of hypotension. At this time the patient is combative and confused. According to daughter Dajuan Pacer does indicate that this is a clear deviation from her baseline. Subjective   General  Chart Reviewed: Yes  Additional Pertinent Hx: Pt is an 80 y.o. female who presented to the ED on 3/26/20 with AMS s/p fall out of w/c and hitting head. 3-31 MRI (+) Acute infarct within the left occipital lobe with possible petechial hemorrhage. Response To Previous Treatment: Patient with no complaints from previous session. Family / Caregiver Present: No  Subjective  Subjective: Pt supine in  bed on arrival.  Requesting to stay in bed. Pain Screening  Patient Currently in Pain: Denies  Vital Signs  Patient Currently in Pain: Denies       Orientation     Cognition      Objective   Bed mobility  Rolling to Left: Moderate assistance  Supine to Sit: Moderate assistance  Transfers  Sit to Stand: Maximum Assistance  Stand to sit: Maximum Assistance  Bed to Chair: Maximum assistance  Stand Pivot Transfers: Maximum Assistance  Ambulation  Ambulation?: No  Stairs/Curb  Stairs?: No     Balance  Posture: Poor  Sitting - Static: Good  Sitting - Dynamic: Good;-  Standing - Static: Poor  Standing - Dynamic: Poor            Comment: 1st session:  Pt sitting in recliner on arrival.  Performed dressing and bathing with cloths as documented by OT with intermittent assist for midline posture due to R lean. Performed SPT to toilet with maxA. TotalA for toileting. Pt demonstrating avoidance behavior with minimal conversation and no eye contact with therapists.   Pt remained in recliner with alarm on and all needs in reach.  2nd session:  Pt supine in bed on arrival.  Performed bed mobility as documented above. Performed toilet transfer iwth stedy and maxA of 1. TotalA for pericare. Pt transitioned to w/c with stedy. Performed standing in standing frame X 10 minutes with focus of upright posture. Performed opening of chest wall with RUE elevation with ER rotation stretch X 1 minute X 3 reps. Performed L cervical rotation to follow conversation X 5 reps. Performed cervical extension X 5 reps. Pt remained in w/c with alarm on and all needs in reach.               G-Code     OutComes Score                                                     AM-PAC Score             Goals  Short term goals  Time Frame for Short term goals: 1-2 weeks  Short term goal 1: Perform all bed mobility with Adriana  Short term goal 2: Perform all transfers mod A  Short term goal 3: Propel w/c 25' with Adriana  Long term goals  Time Frame for Long term goals : 2-3 weeks  Long term goal 1: Perform all bed mobility SBA  Long term goal 2: Perform all transfers SBA  Long term goal 3: Propel w/c 48' with supervision  Patient Goals   Patient goals : \"to transfer on my own again\"    Plan    Plan  Times per week: 60 minutes a day 5 days a week  Current Treatment Recommendations: Functional Mobility Training, Neuromuscular Re-education, Balance Training, Strengthening, Transfer Training, Wheelchair Mobility Training, Manual Therapy - Soft Tissue Mobilization, Endurance Training, Safety Education & Training, Positioning, Equipment Evaluation, Education, & procurement, ROM  Safety Devices  Type of devices: Call light within reach, Nurse notified, Gait belt, All fall risk precautions in place, Chair alarm in place, Left in chair  Restraints  Initially in place: No     Therapy Time   Individual Concurrent Group Co-treatment   Time In       0915   Time Out       0945   Minutes       30        Second Session Therapy Time:   Individual Concurrent Group Co-treatment   Time In       1245   Time Out       1320   Minutes       35     Timed Code Treatment Minutes:  30+35    Total Treatment Minutes:  [address], [address] [place], [place] [address]

## 2020-04-09 ENCOUNTER — APPOINTMENT (OUTPATIENT)
Dept: GENERAL RADIOLOGY | Age: 85
DRG: 056 | End: 2020-04-09
Attending: PHYSICAL MEDICINE & REHABILITATION
Payer: MEDICARE

## 2020-04-09 LAB
HCT VFR BLD CALC: 37 % (ref 36–48)
HEMOGLOBIN: 12.4 G/DL (ref 12–16)
MCH RBC QN AUTO: 32.3 PG (ref 26–34)
MCHC RBC AUTO-ENTMCNC: 33.6 G/DL (ref 31–36)
MCV RBC AUTO: 95.9 FL (ref 80–100)
PDW BLD-RTO: 14.5 % (ref 12.4–15.4)
PLATELET # BLD: 396 K/UL (ref 135–450)
PMV BLD AUTO: 8.8 FL (ref 5–10.5)
RBC # BLD: 3.86 M/UL (ref 4–5.2)
WBC # BLD: 9.3 K/UL (ref 4–11)

## 2020-04-09 PROCEDURE — 92526 ORAL FUNCTION THERAPY: CPT

## 2020-04-09 PROCEDURE — 71045 X-RAY EXAM CHEST 1 VIEW: CPT

## 2020-04-09 PROCEDURE — 36415 COLL VENOUS BLD VENIPUNCTURE: CPT

## 2020-04-09 PROCEDURE — 1280000000 HC REHAB R&B

## 2020-04-09 PROCEDURE — 2580000003 HC RX 258: Performed by: PHYSICAL MEDICINE & REHABILITATION

## 2020-04-09 PROCEDURE — 85027 COMPLETE CBC AUTOMATED: CPT

## 2020-04-09 PROCEDURE — 97530 THERAPEUTIC ACTIVITIES: CPT

## 2020-04-09 PROCEDURE — 97129 THER IVNTJ 1ST 15 MIN: CPT

## 2020-04-09 PROCEDURE — 6370000000 HC RX 637 (ALT 250 FOR IP): Performed by: PHYSICAL MEDICINE & REHABILITATION

## 2020-04-09 PROCEDURE — 97535 SELF CARE MNGMENT TRAINING: CPT

## 2020-04-09 PROCEDURE — 6360000002 HC RX W HCPCS: Performed by: PHYSICAL MEDICINE & REHABILITATION

## 2020-04-09 RX ADMIN — PIPERACILLIN AND TAZOBACTAM 3.38 G: 3; .375 INJECTION, POWDER, LYOPHILIZED, FOR SOLUTION INTRAVENOUS at 01:40

## 2020-04-09 RX ADMIN — DILTIAZEM HYDROCHLORIDE 30 MG: 60 TABLET, FILM COATED ORAL at 15:16

## 2020-04-09 RX ADMIN — LANSOPRAZOLE 30 MG: 30 TABLET, ORALLY DISINTEGRATING ORAL at 06:21

## 2020-04-09 RX ADMIN — DILTIAZEM HYDROCHLORIDE 30 MG: 60 TABLET, FILM COATED ORAL at 09:07

## 2020-04-09 RX ADMIN — Medication 10 ML: at 01:39

## 2020-04-09 RX ADMIN — PIPERACILLIN AND TAZOBACTAM 3.38 G: 3; .375 INJECTION, POWDER, LYOPHILIZED, FOR SOLUTION INTRAVENOUS at 17:37

## 2020-04-09 RX ADMIN — Medication 10 ML: at 20:27

## 2020-04-09 RX ADMIN — LOSARTAN POTASSIUM 100 MG: 100 TABLET, FILM COATED ORAL at 09:06

## 2020-04-09 RX ADMIN — PIPERACILLIN AND TAZOBACTAM 3.38 G: 3; .375 INJECTION, POWDER, LYOPHILIZED, FOR SOLUTION INTRAVENOUS at 10:03

## 2020-04-09 RX ADMIN — Medication 10 ML: at 01:46

## 2020-04-09 RX ADMIN — BACLOFEN 5 MG: 10 TABLET ORAL at 20:28

## 2020-04-09 RX ADMIN — DESMOPRESSIN ACETATE 40 MG: 0.2 TABLET ORAL at 20:28

## 2020-04-09 RX ADMIN — ASPIRIN 81 MG 81 MG: 81 TABLET ORAL at 09:06

## 2020-04-09 RX ADMIN — DILTIAZEM HYDROCHLORIDE 30 MG: 60 TABLET, FILM COATED ORAL at 20:28

## 2020-04-09 RX ADMIN — BACLOFEN 5 MG: 10 TABLET ORAL at 09:07

## 2020-04-09 RX ADMIN — DILTIAZEM HYDROCHLORIDE 30 MG: 60 TABLET, FILM COATED ORAL at 01:41

## 2020-04-09 RX ADMIN — BACLOFEN 5 MG: 10 TABLET ORAL at 15:16

## 2020-04-09 RX ADMIN — POTASSIUM BICARBONATE 40 MEQ: 782 TABLET, EFFERVESCENT ORAL at 09:06

## 2020-04-09 RX ADMIN — Medication 10 ML: at 09:06

## 2020-04-09 RX ADMIN — SERTRALINE 25 MG: 50 TABLET, FILM COATED ORAL at 09:06

## 2020-04-09 ASSESSMENT — PAIN SCALES - GENERAL
PAINLEVEL_OUTOF10: 0

## 2020-04-09 NOTE — PROGRESS NOTES
1015, 1245   Time Out       1055, 1326   Minutes       40, 41        Timed Code Treatment Minutes:  40 + 41  Total Treatment Minutes:  81 minutes       Harleen Lobo, SARAH Lobo OTR/L, [place] #[id number]

## 2020-04-09 NOTE — PLAN OF CARE
Problem: IP BLADDER/VOIDING  Goal: STG - Patient demonstrates no accidents  4/8/2020 2148 by Luther Shetty RN  Outcome: Ongoing     Problem: IP BOWEL ELIMINATION  Goal: STG - Patient participates in bowel management program  4/8/2020 2148 by Luther Shetty RN  Outcome: Ongoing     Problem: NUTRITION  Goal: Patient maintains adequate hydration  4/8/2020 2148 by Luther Shetty RN  Outcome: Ongoing     Problem: SAFETY  Goal: LTG - Patient will demonstrate safety requirements appropriate to situation/environment  4/8/2020 2148 by Luther Shetty RN  Outcome: Ongoing     Problem: SKIN INTEGRITY  Goal: STG - patient will maintain good skin integrity  4/8/2020 2148 by Luther Shetty RN  Outcome: Ongoing     Problem: SKIN INTEGRITY  Goal: STG - patient demonstrates pressure reduction techniques  Outcome: Ongoing     Problem: PAIN  Goal: LTG - Patient will demonstrate intervention for managing pain  Outcome: Ongoing     Problem: PAIN  Goal: STG - pain is manageable through therapies  Outcome: Ongoing     Problem: Falls - Risk of:  Goal: Will remain free from falls  Description: Will remain free from falls  4/8/2020 2148 by Luther Shetty RN  Outcome: Ongoing     Problem: Falls - Risk of:  Goal: Absence of physical injury  Description: Absence of physical injury  Outcome: Ongoing     Problem: Neurological  Goal: Maximum potential motor/sensory/cognitive function  Outcome: Ongoing     Problem: Nutrition  Goal: Optimal nutrition therapy  4/8/2020 2148 by Luther Shetty RN  Outcome: Ongoing     Problem: Pain:  Goal: Pain level will decrease  Description: Pain level will decrease  Outcome: Ongoing     Problem: Pain:  Goal: Control of acute pain  Description: Control of acute pain  Outcome: Ongoing     Problem: Pain:  Goal: Control of chronic pain  Description: Control of chronic pain  Outcome: Ongoing

## 2020-04-09 NOTE — PROGRESS NOTES
Discussed goals of care. Performed supine to sit transition with modA. Performed SPT from bed to w/c with max A of 1 and modA of 1. Performed w/c mobility X 219' with CGA and slow basilio. Pt returned to room and remained in w/c with alarm on and all needs in reach with family present.                G-Code     OutComes Score                                                     AM-PAC Score             Goals  Short term goals  Time Frame for Short term goals: 1-2 weeks  Short term goal 1: Perform all bed mobility with Adriana  Short term goal 2: Perform all transfers mod A  Short term goal 3: Propel w/c 25' with Adriana  Long term goals  Time Frame for Long term goals : 2-3 weeks  Long term goal 1: Perform all bed mobility SBA  Long term goal 2: Perform all transfers SBA  Long term goal 3: Propel w/c 48' with supervision  Patient Goals   Patient goals : \"to transfer on my own again\"    Plan    Plan  Times per week: 60 minutes a day 5 days a week  Current Treatment Recommendations: Functional Mobility Training, Neuromuscular Re-education, Balance Training, Strengthening, Transfer Training, Wheelchair Mobility Training, Manual Therapy - Soft Tissue Mobilization, Endurance Training, Safety Education & Training, Positioning, Equipment Evaluation, Education, & procurement, ROM, Patient/Caregiver Education & Training  Safety Devices  Type of devices: Call light within reach, Nurse notified, Gait belt, All fall risk precautions in place, Bed alarm in place, Left in bed  Restraints  Initially in place: No     Therapy Time   Individual Concurrent Group Co-treatment   Time In       1015   Time Out       1055   Minutes       40        Second Session Therapy Time:   Individual Concurrent Group Co-treatment   Time In       1245   Time Out       1326   Minutes       41     Timed Code Treatment Minutes:  41+40    Total Treatment Minutes:  [address] [place], [place] [id number]

## 2020-04-09 NOTE — PROGRESS NOTES
Calorie Count Note    Type and Reason for Visit: Reassess    Current diet and supplement order:  Dietary Nutrition Supplements: Frozen Oral Supplement  DIET DYSPHAGIA PUREED; Mildly Thick (Nectar)        Comparative Standards (Estimated Nutrition Needs):   · Estimated Daily Total Kcal: 3698-8578 3069  · Estimated Daily Protein (g): 62- 72(1.3-1.5) 67 gms     Date Consumed PO Intake Kcal %   Kcal met PO Intake grams protein %  Protein met    Comments   4/7 397 25% 9.25 14%  3 meals   4/8 946  60% 24 gms 36%  3 meals                                                           **Results will be posted as available. Intervention & Recommendations:   1. Continue Calorie Count  2.   Continue magic cups    Electronically signed by Esteban Castañeda RD, CNSC, LD on 4/9/20 at 9:04 AM EDT    Contact Number: [phone]

## 2020-04-09 NOTE — PROGRESS NOTES
pharyngeal clearing as intermittent wet vocal quality was noted post swallow      Puree solids   - Accepted magic cup, oatmeal, and one bite of pureed sausage  - Delayed cough noted x1 across trials with slight change in vocal quality  - Again suspect reduced pharyngeal clearing is contributing    Mildly thick (nectar) liquids  - consumed water, coffee, and orange juice  - Delayed coughing noted on 1/8 trials           Puree solids  - Magic cup with crushed medication, adequate tolerance noted  - Slow rate of intake with patient requiring cues and encouragement to accept PO trials     Progressing    Pt will improve oral motor strength and ROM for coordinated and alternating motions, via graded tasks to improve speech back to baseline level as subjectively rated by SLP/pt and family. Goal not targeted this session. Goal not targeted this session. Progressing   The patient will demonstrate improved self awareness and awareness of deficits related to CVA and impact on daily function. Goal not targeted this session. Goal not targeted this session. Progressing     Pt will complete short term memory tasks and recall fx information for improved recall and carryover of information from day to day. Goal not targeted this session. Immediate recall task (three words): 43% accuracy    Mental manipulation of list of words: 63% accuracy with repetition required 50% of the time. Progressing   Other areas targeted:  Oriented to facility, month, and year. Disoriented to current date and day of week. Education:   Provided ongoing education re importance of intake. Pt's daughters present later in the date and therapist provided education regarding rationale for current diet modifications. Ongoing education re current diet level and importance of improved PO intake.       Safety Devices: [x] Call light within reach  [x] Chair alarm activated  [] Bed alarm activated  [] Other: [x] Call light within reach  [x] Chair alarm activated  [] Bed alarm activated  [] Other:      Assessment:   Speech Therapy Diagnosis  Cognitive Diagnosis: Pt presents with moderate to severe cognitive linguistic deficits in the domains of judgement, insight into deficits, working and short term memory, and attention. Pt with history of dementia and has 24/7 supervision-assistance from family. Speech Diagnosis: Pt presents with mild dysarthria due to left perioral weakness. Conversational intelligibility was mildly reduced and also impacted by wet vocal quality after intake of solids.      Current Diet Order: Dietary Nutrition Supplements: Frozen Oral Supplement  DIET DYSPHAGIA PUREED; Mildly Thick (Nectar)   Recommended Form of Meds: Crushed in puree as able(Pt reported she does not like the taste of medications )  Compensatory Swallowing Strategies: Small bites/sips, Assist feed, Remain upright for 30-45 minutes after meals, Upright as possible for all oral intake, Eat/Feed slowly     Plan:   Speech therapy 5 days wk/ 60 minutes per day    Frequency:  5days/week   60 minutes/day    Discharge Recommendations:   Barriers: cognition, awareness, insight, physical limitations   Discharge Recommendations:  [] Home independently  [x] Home with assistance []  24 hour supervision  [] SNF [] Other:  Continued SLP Treatment:  [x] Yes [] No [] TBD based on progress while on ARU [] Vital Stim indicated [] Other:   Estimated discharge date: TBD    Type of Total Treatment Minutes   Session 1   Session 2   Time In 0800 0900   Time Out 0830 0930   Timed Code Minutes  0 15   Individual Treatment Minutes  30 30   Co-Treatment Minutes      Group Treatment Minutes      Concurrent Treatment Minutes        TOTAL DAILY MINUTES:  60 minutes    Electronically Signed by     Sharri Warren M.A., [address] [place] [address]  Speech-Language Pathologist

## 2020-04-09 NOTE — PROGRESS NOTES
Viviana Prader  4/9/2020  [id number]    Chief Complaint: Acute ischemic stroke ([address]    Subjective:   No overnight events. No current complaints. Low level of function. Leukocytosis improving with abx. ROS: No CP, SOB, dyspnea    Objective:  Patient Vitals for the past 24 hrs:   BP Temp Temp src Pulse Resp SpO2 Weight   04/09/20 0900 132/67 98.1 °F (36.7 °C) Oral 73 16 96 % --   04/09/20 0545 -- -- -- -- -- -- 143 lb 9.6 oz (65.1 kg)   04/09/20 0136 (!) 145/66 -- -- 66 16 -- --   04/08/20 2030 (!) 133/58 97.9 °F (36.6 °C) Oral 69 16 95 % --     Gen: No distress, pleasant. Resting in bedside chair  HEENT: Normocephalic, atraumatic. CV: Regular rate and rhythm. No MRG   Resp: No respiratory distress. Coarse with diminished BS at BLL  Abd: Soft, nontender nondistended  Ext: No edema. Neuro: Alert, oriented, appropriately interactive. Dysarthria. LUE contracted with 0/5 strength and unable to fully abduct shoulder, extend elbow, or extend fingers and wrist due to pain    Laboratory data: Available via EMR. Therapy progress:  PT  Position Activity Restriction  Other position/activity restrictions: 80 y.o. female patient presenting by EMS from home. Patient lives with her daughter and son-in-law. Patient in usual state of health on Tuesday. Yesterday morning, Wednesday she woke with altered mental status which has progressed. Phone call to PCP indicating concern for UTI. Requested UA. UA not obtained. Cipro initiated. I did speak with daughter Tyson Phalen at [phone] who indicated she had similar presentation in October 1, 2017 at which time she was admitted with altered mental status secondary to UTI. She was admitted to ICU because of hypotension. At this time the patient is combative and confused. According to daughter Tyson Phalen does indicate that this is a clear deviation from her baseline.   Objective     Sit to Stand: Maximum Assistance  Stand to sit: Maximum Assistance  Bed to Chair: Maximum assistance     OT  PT Equipment Recommendations  Equipment Needed: No  Other: Pt owns w/c at home  Toilet - Technique: Stand pivot  Equipment Used: Standard bedside commode  Toilet Transfers Comments: Max A of 1 + second person assist for clothing management   Assessment        SLP  Current Diet : Dysphagia Pureed (Dysphagia I)  Current Liquid Diet : Moderately Thick (Honey)  Diet Solids Recommendation: Dysphagia Pureed (Dysphagia I)  Liquid Consistency Recommendation: Thin    Body mass index is 27.13 kg/m². Assessment:  Patient Active Problem List   Diagnosis    Hemiplegia, late effect of cerebrovascular disease ([address]    Allergic rhinitis    Irritable bowel syndrome    Peripheral vascular disease ([address]    Cerebrovascular accident (CVA) due to occlusion of cerebral artery ([address]    Occlusion and stenosis of carotid artery with cerebral infarction    Irritable bladder    Overactive bladder    Essential hypertension    Mixed hyperlipidemia    Compression fracture of L1 lumbar vertebra (HCC)    Gastroesophageal reflux disease without esophagitis    Hyponatremia    Renal insufficiency    AMS (altered mental status)    Acute metabolic encephalopathy    UTI (urinary tract infection)    High anion gap metabolic acidosis    Leukocytosis    Atrial fibrillation with rapid ventricular response (HCC)    Dementia (HCC)    Dysphagia    Aspiration pneumonia (HCC)    Acute ischemic stroke ([address]    Moderate malnutrition ([address]       Plan:   Acute L PCA infarct: Mild petechial hemorrhage. ASA, statin. Xarelto to initiate on 4/14 per neuro. PT/OT/SLP     Afib w/ RVR: Xarelto as above, cardizem 30     Aspiration PNA: levaquin ineffective and transitioned to zosyn, MRSA screen negative.  - check XR      Dysphagia: dysphagia diet, SLP     Spasticity: failed multiple medications and interventions in the past. Trialing baclofen 5 TID     HTN: losartan 100     HLD: lipitor     H/O R MCA CVA: residual L hemiparesis

## 2020-04-09 NOTE — PLAN OF CARE
Problem: SAFETY  Goal: LTG - Patient will demonstrate safety requirements appropriate to situation/environment  Outcome: Ongoing  Note: Pt remains free from falls. Safety precautions in place. Bed in lowest position, bed/chair wheels locked, call light with in reach, bed/chair alarm on,fall risk wrist band on, SAFE outside of doorway. Will continue to monitor.

## 2020-04-10 LAB
ANION GAP SERPL CALCULATED.3IONS-SCNC: 10 MMOL/L (ref 3–16)
BUN BLDV-MCNC: 9 MG/DL (ref 7–20)
CALCIUM SERPL-MCNC: 9.1 MG/DL (ref 8.3–10.6)
CHLORIDE BLD-SCNC: 95 MMOL/L (ref 99–110)
CO2: 25 MMOL/L (ref 21–32)
CREAT SERPL-MCNC: 0.6 MG/DL (ref 0.6–1.2)
GFR AFRICAN AMERICAN: >60
GFR NON-AFRICAN AMERICAN: >60
GLUCOSE BLD-MCNC: 96 MG/DL (ref 70–99)
HCT VFR BLD CALC: 39 % (ref 36–48)
HEMOGLOBIN: 13 G/DL (ref 12–16)
MAGNESIUM: 2.1 MG/DL (ref 1.8–2.4)
MCH RBC QN AUTO: 31.8 PG (ref 26–34)
MCHC RBC AUTO-ENTMCNC: 33.3 G/DL (ref 31–36)
MCV RBC AUTO: 95.7 FL (ref 80–100)
PDW BLD-RTO: 14.5 % (ref 12.4–15.4)
PLATELET # BLD: 380 K/UL (ref 135–450)
PMV BLD AUTO: 9 FL (ref 5–10.5)
POTASSIUM SERPL-SCNC: 4.4 MMOL/L (ref 3.5–5.1)
RBC # BLD: 4.07 M/UL (ref 4–5.2)
SODIUM BLD-SCNC: 130 MMOL/L (ref 136–145)
WBC # BLD: 9.8 K/UL (ref 4–11)

## 2020-04-10 PROCEDURE — 6360000002 HC RX W HCPCS: Performed by: PHYSICAL MEDICINE & REHABILITATION

## 2020-04-10 PROCEDURE — 97530 THERAPEUTIC ACTIVITIES: CPT

## 2020-04-10 PROCEDURE — 97130 THER IVNTJ EA ADDL 15 MIN: CPT

## 2020-04-10 PROCEDURE — 92526 ORAL FUNCTION THERAPY: CPT

## 2020-04-10 PROCEDURE — 36415 COLL VENOUS BLD VENIPUNCTURE: CPT

## 2020-04-10 PROCEDURE — 6370000000 HC RX 637 (ALT 250 FOR IP): Performed by: PHYSICAL MEDICINE & REHABILITATION

## 2020-04-10 PROCEDURE — 97129 THER IVNTJ 1ST 15 MIN: CPT

## 2020-04-10 PROCEDURE — 85027 COMPLETE CBC AUTOMATED: CPT

## 2020-04-10 PROCEDURE — 1280000000 HC REHAB R&B

## 2020-04-10 PROCEDURE — 2580000003 HC RX 258: Performed by: PHYSICAL MEDICINE & REHABILITATION

## 2020-04-10 PROCEDURE — 97535 SELF CARE MNGMENT TRAINING: CPT

## 2020-04-10 PROCEDURE — 83735 ASSAY OF MAGNESIUM: CPT

## 2020-04-10 PROCEDURE — 80048 BASIC METABOLIC PNL TOTAL CA: CPT

## 2020-04-10 RX ADMIN — DILTIAZEM HYDROCHLORIDE 30 MG: 60 TABLET, FILM COATED ORAL at 01:46

## 2020-04-10 RX ADMIN — DESMOPRESSIN ACETATE 40 MG: 0.2 TABLET ORAL at 23:43

## 2020-04-10 RX ADMIN — Medication 10 ML: at 09:10

## 2020-04-10 RX ADMIN — Medication 10 ML: at 23:46

## 2020-04-10 RX ADMIN — LANSOPRAZOLE 30 MG: 30 TABLET, ORALLY DISINTEGRATING ORAL at 06:35

## 2020-04-10 RX ADMIN — DILTIAZEM HYDROCHLORIDE 30 MG: 60 TABLET, FILM COATED ORAL at 15:09

## 2020-04-10 RX ADMIN — PIPERACILLIN AND TAZOBACTAM 3.38 G: 3; .375 INJECTION, POWDER, LYOPHILIZED, FOR SOLUTION INTRAVENOUS at 18:20

## 2020-04-10 RX ADMIN — BACLOFEN 5 MG: 10 TABLET ORAL at 09:22

## 2020-04-10 RX ADMIN — ASPIRIN 81 MG 81 MG: 81 TABLET ORAL at 09:21

## 2020-04-10 RX ADMIN — DILTIAZEM HYDROCHLORIDE 30 MG: 60 TABLET, FILM COATED ORAL at 06:35

## 2020-04-10 RX ADMIN — PIPERACILLIN AND TAZOBACTAM 3.38 G: 3; .375 INJECTION, POWDER, LYOPHILIZED, FOR SOLUTION INTRAVENOUS at 01:48

## 2020-04-10 RX ADMIN — PIPERACILLIN AND TAZOBACTAM 3.38 G: 3; .375 INJECTION, POWDER, LYOPHILIZED, FOR SOLUTION INTRAVENOUS at 09:15

## 2020-04-10 RX ADMIN — BACLOFEN 5 MG: 10 TABLET ORAL at 15:09

## 2020-04-10 RX ADMIN — DILTIAZEM HYDROCHLORIDE 30 MG: 60 TABLET, FILM COATED ORAL at 23:44

## 2020-04-10 RX ADMIN — LOSARTAN POTASSIUM 100 MG: 100 TABLET, FILM COATED ORAL at 09:21

## 2020-04-10 RX ADMIN — SERTRALINE 25 MG: 50 TABLET, FILM COATED ORAL at 09:22

## 2020-04-10 RX ADMIN — BACLOFEN 5 MG: 10 TABLET ORAL at 23:42

## 2020-04-10 ASSESSMENT — PAIN SCALES - GENERAL: PAINLEVEL_OUTOF10: 0

## 2020-04-10 NOTE — PROGRESS NOTES
with her daughter and son-in-law. Patient in usual state of health on Tuesday. Yesterday morning, Wednesday she woke with altered mental status which has progressed. Phone call to PCP indicating concern for UTI. Requested UA. UA not obtained. Cipro initiated. I did speak with daughter Pancho at [phone] who indicated she had similar presentation in October 1, 2017 at which time she was admitted with altered mental status secondary to UTI. She was admitted to ICU because of hypotension. At this time the patient is combative and confused. According to daughter Pancho does indicate that this is a clear deviation from her baseline. Subjective   General  Chart Reviewed: Yes  Additional Pertinent Hx: Pt is an 80 y.o. female who presented to the ED on 3/26/20 with AMS s/p fall out of w/c and hitting head. 3-31 MRI (+) Acute infarct within the left occipital lobe with possible petechial hemorrhage. Response To Previous Treatment: Patient with no complaints from previous session. Family / Caregiver Present: No  Referring Practitioner: Dr Tamera Qureshi  Subjective  Subjective: Patient states she wants to go home. General Comment  Comments: Patient supine in bed taking medicine with nursing staff.   Pain Screening  Patient Currently in Pain: Denies  Vital Signs  Patient Currently in Pain: Denies       Orientation  Orientation  Overall Orientation Status: Within Functional Limits  Orientation Level: Oriented X4     Objective   Bed mobility  Rolling to Left: Maximum assistance  Rolling to Right: Maximum assistance  Supine to Sit: Dependent/Total  Transfers  Sit to Stand: Maximum Assistance  Stand to sit: Maximum Assistance  Bed to Chair: Maximum assistance  Stand Pivot Transfers: Maximum Assistance  Ambulation  Ambulation?: No  Stairs/Curb  Stairs?: No  Wheelchair Activities  Wheelchair Type: Standard  Wheelchair Cushion: None  Propulsion: Yes  Propulsion 1  Propulsion: Manual  Level: Level Tile  Method: RUE  Level of Time   Individual Concurrent Group Co-treatment   Time In       0930   Time Out       1000   Minutes       30   Timed Code Treatment Minutes: 30 Minutes      Individual Concurrent Group Co-treatment   Time In       1030   Time Out       1100   Minutes       30   Timed Code Treatment Minutes: [address] [place], [place], DPT, ATC-R [id number]

## 2020-04-10 NOTE — PROGRESS NOTES
Pt refusing to drink effervescent potassium mixture in thickened water.  Electronically signed by Sinan Reyes RN on 4/10/2020 at 10:18 AM

## 2020-04-10 NOTE — PROGRESS NOTES
spillage and frequently asks for tissues to clear   Double swallow, effortful swallow and oral clearing cues do not appear to improve the anterior loss and oral holding. Utilized Yankauer suction at the end of the meal to clear oral cavity. Facilitated trials of ice chips at the end of the meal.   - pt seemingly tolerated 4/5 presentations   - lingual pumping   - mod-max cues for effortful and double swallow      The patient will tolerate recommended diet without observed clinical signs of aspiration   Pt seemingly tolerated mildly thick (nectar) liquids via cup. Intermittent signs of pharyngeal pooling of thick consistencies via wet vocal quality. Required mod-max cues for effortful and double swallow with puree solids. Pt with wet vocal quality post puree solids. Mod-max cues for effortful and double swallow. Pt will improve oral motor strength and ROM for coordinated and alternating motions, via graded tasks to improve speech back to baseline level as subjectively rated by SLP/pt and family. Goal not targeted this session. Goal not targeted this session. The patient will demonstrate improved self awareness and awareness of deficits related to CVA and impact on daily function. Goal not targeted this session. Goal not targeted this session. Pt will complete short term memory tasks and recall fx information for improved recall and carryover of information from day to day. Functional recall   - recalled a recent visit with her daughters   - unable to recall and implement recommendations   - vague recall/recognition of SLP from previous sessions      Goal not targeted this session. Other areas targeted:  4-5 step sequencing task   - required min-mod cues for reading print   - extended time required   - completed with 80% accuracy      Education:   Ongoing education provided re rationale for diet recommendations and aspiration risk.  Pt requires ongoing education for Treatment Minutes        TOTAL DAILY MINUTES:  60 minutes    Electronically Signed by     Chuy Yadav M.A.  CCC-SLP [id number] [id number]  Speech-Language Pathologist [phone]  4/10/2020 9:00 AM

## 2020-04-10 NOTE — PROGRESS NOTES
Occupational Therapy  Facility/Department: [facility] ACUTE REHAB UNIT  Daily Treatment Note  NAME: Jaspreet Montgomery  : 11/3/1933  MRN: [id number]    Date of Service: 4/10/2020    Discharge Recommendations:  Continue to assess pending progress, Patient would benefit from continued therapy after discharge  OT Equipment Recommendations  Equipment Needed: No  Other: Continue to assess pending pt progress    Assessment   Performance deficits / Impairments: Decreased functional mobility ; Decreased strength;Decreased endurance;Decreased ADL status; Decreased safe awareness;Decreased cognition;Decreased balance;Decreased fine motor control;Decreased posture;Decreased coordination  Assessment: Pt presents w/ the above deficits which are impacting her occupational performance. Pt would benefit from continued therapy during inpatient stay in order to maximize safety and independence upon discharge. Treatment Diagnosis: Multiple performance deficits associated w/ CVA  Prognosis: Fair  OT Education: OT Role;Plan of Care;ADL Adaptive Strategies;Transfer Training;Equipment  Patient Education: Pt verbalized understanding but will require continued reinforcement to promote carryover  Barriers to Learning: Cognition, motivation, agitation  REQUIRES OT FOLLOW UP: Yes  Activity Tolerance  Activity Tolerance: Patient limited by fatigue;Treatment limited secondary to agitation  Activity Tolerance: Pt w/ very little active participation throughout. Safety Devices  Safety Devices in place: Yes  Type of devices: All fall risk precautions in place;Call light within reach; Chair alarm in place; Left in chair;Gait belt;Nurse notified         Patient Diagnosis(es): There were no encounter diagnoses. has a past medical history of Cerebral artery occlusion with cerebral infarction [facility]), Cerebral vascular disease, Hyperlipidemia, Hypertension, Peripheral vascular disease ([facility] [address] and Urinary incontinence.    has a past surgical history that Cognition  Overall Cognitive Status: Exceptions  Arousal/Alertness: Delayed responses to stimuli  Following Commands: Follows one step commands with increased time  Attention Span: Difficulty attending to directions  Memory: Decreased recall of recent events;Decreased short term memory  Safety Judgement: Decreased awareness of need for assistance;Decreased awareness of need for safety  Problem Solving: Assistance required to generate solutions;Assistance required to implement solutions  Insights: Decreased awareness of deficits  Initiation: Requires cues for all  Sequencing: Requires cues for all     Perception  Overall Perceptual Status: Impaired  Unilateral Attention: Cues to attend left visual field          2nd Session denies pain. In W/C, requesting to go to BR. Patient self propels w/c with R UE/LE to BR. Max and Min of 1 SPT to toilet. Patient incontinent of urine in depend. Pt utilizes toilet for bowel movement. Dependent for clothing management. Dependent for stance in pericare. Pt. Incontinent of urine when pulling up clothing. Pants, brief, socks, changed  Max/dependent. Patient washed R hand with min A at sink in sitting. Propels chair with R hand out of bathroom and into singer. Patient left in chair with alarm set and needs in place. Plan   Plan  Times per week: 60 min; 5-7x week   Times per day: Daily  Current Treatment Recommendations: Functional Mobility Training, Strengthening, Endurance Training, Balance Training, Safety Education & Training, Equipment Evaluation, Education, & procurement, Patient/Caregiver Education & Training, Self-Care / ADL, Neuromuscular Re-education, Cognitive Reorientation       Goals  Short term goals  Time Frame for Short term goals: 3 weeks   Short term goal 1: Functional transfer for ADL completion w/ Min A  Short term goal 2: Bathing w/ Min A   Short term goal 3: UB dressing w/ Min A  Short term goal 4: LB dressing w/ Min A  Short term goal 5:  Toileting w/

## 2020-04-10 NOTE — PROGRESS NOTES
Apryl Sykes  4/10/2020  [id number]    Chief Complaint: Acute ischemic stroke ([facility] [address]    Subjective:   No overnight events. No current complaints. Low level of function. Hyponatremia remains. Refusing meds this am. Refusing therapy this am.    ROS: No CP, SOB, dyspnea    Objective:  Patient Vitals for the past 24 hrs:   BP Temp Temp src Pulse Resp SpO2   04/10/20 0917 112/66 97.9 °F (36.6 °C) Oral 64 16 96 %   04/10/20 0635 (!) 171/74 -- -- -- -- --   04/10/20 0146 (!) 147/70 -- -- -- -- --   04/09/20 1943 (!) 153/70 97.9 °F (36.6 °C) Oral 60 16 94 %     Gen: No distress, pleasant. Resting in bed  HEENT: Normocephalic, atraumatic  CV: Regular rate and rhythm. No MRG   Resp: No respiratory distress. Diminished BS at BLL  Abd: Soft, nontender nondistended  Ext: No edema  Neuro: Alert, oriented, appropriately interactive. Dysarthria. LUE contracted with 0/5 strength and unable to fully abduct shoulder, extend elbow, or extend fingers and wrist due to pain    Laboratory data: Available via EMR. Therapy progress:  PT  Position Activity Restriction  Other position/activity restrictions: 80 y.o. female patient presenting by EMS from home. Patient lives with her daughter and son-in-law. Patient in usual state of health on Tuesday. Yesterday morning, Wednesday she woke with altered mental status which has progressed. Phone call to PCP indicating concern for UTI. Requested UA. UA not obtained. Cipro initiated. I did speak with daughter Lisa Guerra at [phone] who indicated she had similar presentation in October 1, 2017 at which time she was admitted with altered mental status secondary to UTI. She was admitted to ICU because of hypotension. At this time the patient is combative and confused. According to daughter Lisa Charlie does indicate that this is a clear deviation from her baseline.   Objective     Sit to Stand: Maximum Assistance  Stand to sit: Maximum Assistance  Bed to Chair: Maximum assistance     OT  PT resolved    Depression: Started zoloft     Bowels: Per protocol  Bladder: Per protocol   Sleep: Trazodone provided prn. Pain: tylenol, tramadol PRN   DVT PPx: SCDs      Dispo: Will discuss with family about coming in to motivate patient vs determining next level of care. Qian Li MD 4/10/2020, 9:59 AM    * This document was created using dictation software. While all precautions were taken to ensure accuracy, errors may have occurred. Please disregard any typographical errors.

## 2020-04-10 NOTE — CARE COORDINATION
Spoke with pt's dtr to inform her that pt is refusing therapy and medications and wether they would like palliative care/hospice, SNF, or return home. Dtr to speak with pt and call this worker back.  Sridevi Carbajal, MSW, LSW

## 2020-04-11 PROCEDURE — 6360000002 HC RX W HCPCS: Performed by: PHYSICAL MEDICINE & REHABILITATION

## 2020-04-11 PROCEDURE — 1280000000 HC REHAB R&B

## 2020-04-11 PROCEDURE — 6370000000 HC RX 637 (ALT 250 FOR IP): Performed by: PHYSICAL MEDICINE & REHABILITATION

## 2020-04-11 PROCEDURE — 2580000003 HC RX 258: Performed by: PHYSICAL MEDICINE & REHABILITATION

## 2020-04-11 RX ADMIN — BACLOFEN 5 MG: 10 TABLET ORAL at 09:58

## 2020-04-11 RX ADMIN — Medication 10 ML: at 22:43

## 2020-04-11 RX ADMIN — PIPERACILLIN AND TAZOBACTAM 3.38 G: 3; .375 INJECTION, POWDER, LYOPHILIZED, FOR SOLUTION INTRAVENOUS at 22:43

## 2020-04-11 RX ADMIN — DILTIAZEM HYDROCHLORIDE 30 MG: 60 TABLET, FILM COATED ORAL at 14:21

## 2020-04-11 RX ADMIN — Medication 10 ML: at 12:27

## 2020-04-11 RX ADMIN — DILTIAZEM HYDROCHLORIDE 30 MG: 60 TABLET, FILM COATED ORAL at 20:36

## 2020-04-11 RX ADMIN — DESMOPRESSIN ACETATE 40 MG: 0.2 TABLET ORAL at 20:36

## 2020-04-11 RX ADMIN — PIPERACILLIN AND TAZOBACTAM 3.38 G: 3; .375 INJECTION, POWDER, LYOPHILIZED, FOR SOLUTION INTRAVENOUS at 02:17

## 2020-04-11 RX ADMIN — Medication 10 ML: at 20:37

## 2020-04-11 RX ADMIN — LANSOPRAZOLE 30 MG: 30 TABLET, ORALLY DISINTEGRATING ORAL at 06:49

## 2020-04-11 RX ADMIN — DILTIAZEM HYDROCHLORIDE 30 MG: 60 TABLET, FILM COATED ORAL at 09:55

## 2020-04-11 RX ADMIN — PIPERACILLIN AND TAZOBACTAM 3.38 G: 3; .375 INJECTION, POWDER, LYOPHILIZED, FOR SOLUTION INTRAVENOUS at 12:27

## 2020-04-11 RX ADMIN — LOSARTAN POTASSIUM 100 MG: 100 TABLET, FILM COATED ORAL at 09:57

## 2020-04-11 RX ADMIN — ASPIRIN 81 MG 81 MG: 81 TABLET ORAL at 09:57

## 2020-04-11 RX ADMIN — BACLOFEN 5 MG: 10 TABLET ORAL at 20:37

## 2020-04-11 RX ADMIN — SERTRALINE 25 MG: 50 TABLET, FILM COATED ORAL at 09:57

## 2020-04-11 RX ADMIN — BACLOFEN 5 MG: 10 TABLET ORAL at 14:21

## 2020-04-11 ASSESSMENT — PAIN SCALES - GENERAL
PAINLEVEL_OUTOF10: 0
PAINLEVEL_OUTOF10: 0

## 2020-04-11 NOTE — PROGRESS NOTES
Calorie Count Note    Type and Reason for Visit: Kcal ct  Current diet and supplement order:  Dietary Nutrition Supplements: Frozen Oral Supplement  DIET DYSPHAGIA PUREED; Mildly Thick (Nectar)      Comparative Standards (Estimated Nutrition Needs):   · Estimated Daily Total Kcal: 8629-1753 ~1575  · Estimated Daily Protein (g): 62- 72(1.3-1.5) 67 gms     Date Consumed PO Intake Kcal %   Kcal met PO Intake grams protein %  Protein met    Comments   4/7 397 25% 9.25 14%  3 meals   4/8 946  60% 24 gms 36%  3 meals   4/9   951 60%  33 49%  3 meals     4/10 533  34%  25  37%  X 1 meal & 1 magic cup                                **Results will be posted as available.                                                                              Intervention & Recommendations:   1. Continue Calorie Count  2.   Continue Magic cups TID      Electronically signed by Rom Poon RD, LD on 4/11/2020 at 9:45 AM    [phone]

## 2020-04-11 NOTE — PROGRESS NOTES
Recommendations  Equipment Needed: No  Other: Pt owns w/c at home  Toilet - Technique: Stand pivot  Equipment Used: Standard bedside commode  Toilet Transfers Comments: Max A x2  Assessment        SLP  Current Diet : Dysphagia Pureed (Dysphagia I)  Current Liquid Diet : Moderately Thick (Honey)  Diet Solids Recommendation: Dysphagia Pureed (Dysphagia I)  Liquid Consistency Recommendation: Thin    Body mass index is 28.21 kg/m². Assessment:  Patient Active Problem List   Diagnosis    Hemiplegia, late effect of cerebrovascular disease ([address]    Allergic rhinitis    Irritable bowel syndrome    Peripheral vascular disease ([address]    Cerebrovascular accident (CVA) due to occlusion of cerebral artery ([address]    Occlusion and stenosis of carotid artery with cerebral infarction    Irritable bladder    Overactive bladder    Essential hypertension    Mixed hyperlipidemia    Compression fracture of L1 lumbar vertebra (HCC)    Gastroesophageal reflux disease without esophagitis    Hyponatremia    Renal insufficiency    AMS (altered mental status)    Acute metabolic encephalopathy    UTI (urinary tract infection)    High anion gap metabolic acidosis    Leukocytosis    Atrial fibrillation with rapid ventricular response (HCC)    Dementia (HCC)    Dysphagia    Aspiration pneumonia (HCC)    Acute ischemic stroke ([address]    Moderate malnutrition ([address]       Plan:   Acute L PCA infarct: Mild petechial hemorrhage. ASA, statin. Xarelto to initiate on 4/14 per neuro. PT/OT/SLP     Afib w/ RVR: Xarelto as above, cardizem 30     Aspiration PNA: levaquin ineffective and transitioned to zosyn, MRSA screen negative.  CXR improving.      Dysphagia: dysphagia diet, SLP     Spasticity: failed multiple medications and interventions in the past. Trialing baclofen 5 TID     HTN: losartan 100     HLD: lipitor     H/O R MCA CVA: residual L hemiparesis with cognitive deficits      Leukocytosis: as above, resolved    Depression:

## 2020-04-12 LAB
GLUCOSE BLD-MCNC: 117 MG/DL (ref 70–99)
PERFORMED ON: ABNORMAL

## 2020-04-12 PROCEDURE — 6360000002 HC RX W HCPCS: Performed by: PHYSICAL MEDICINE & REHABILITATION

## 2020-04-12 PROCEDURE — 1280000000 HC REHAB R&B

## 2020-04-12 PROCEDURE — 6370000000 HC RX 637 (ALT 250 FOR IP): Performed by: PHYSICAL MEDICINE & REHABILITATION

## 2020-04-12 PROCEDURE — 2580000003 HC RX 258: Performed by: PHYSICAL MEDICINE & REHABILITATION

## 2020-04-12 RX ADMIN — DILTIAZEM HYDROCHLORIDE 30 MG: 60 TABLET, FILM COATED ORAL at 20:31

## 2020-04-12 RX ADMIN — BACLOFEN 5 MG: 10 TABLET ORAL at 13:52

## 2020-04-12 RX ADMIN — ASPIRIN 81 MG 81 MG: 81 TABLET ORAL at 08:23

## 2020-04-12 RX ADMIN — DILTIAZEM HYDROCHLORIDE 30 MG: 60 TABLET, FILM COATED ORAL at 08:23

## 2020-04-12 RX ADMIN — DILTIAZEM HYDROCHLORIDE 30 MG: 60 TABLET, FILM COATED ORAL at 02:04

## 2020-04-12 RX ADMIN — DESMOPRESSIN ACETATE 40 MG: 0.2 TABLET ORAL at 20:30

## 2020-04-12 RX ADMIN — LANSOPRAZOLE 30 MG: 30 TABLET, ORALLY DISINTEGRATING ORAL at 06:32

## 2020-04-12 RX ADMIN — BACLOFEN 5 MG: 10 TABLET ORAL at 20:31

## 2020-04-12 RX ADMIN — BACLOFEN 5 MG: 10 TABLET ORAL at 08:22

## 2020-04-12 RX ADMIN — Medication 10 ML: at 22:12

## 2020-04-12 RX ADMIN — SERTRALINE 25 MG: 50 TABLET, FILM COATED ORAL at 08:22

## 2020-04-12 RX ADMIN — PIPERACILLIN AND TAZOBACTAM 3.38 G: 3; .375 INJECTION, POWDER, LYOPHILIZED, FOR SOLUTION INTRAVENOUS at 06:30

## 2020-04-12 RX ADMIN — LOSARTAN POTASSIUM 100 MG: 100 TABLET, FILM COATED ORAL at 08:22

## 2020-04-12 RX ADMIN — PIPERACILLIN AND TAZOBACTAM 3.38 G: 3; .375 INJECTION, POWDER, LYOPHILIZED, FOR SOLUTION INTRAVENOUS at 22:17

## 2020-04-12 RX ADMIN — PIPERACILLIN AND TAZOBACTAM 3.38 G: 3; .375 INJECTION, POWDER, LYOPHILIZED, FOR SOLUTION INTRAVENOUS at 13:53

## 2020-04-12 ASSESSMENT — PAIN SCALES - PAIN ASSESSMENT IN ADVANCED DEMENTIA (PAINAD)
BREATHING: 0
TOTALSCORE: 0
FACIALEXPRESSION: 0
CONSOLABILITY: 0
BODYLANGUAGE: 0
BREATHING: 0
NEGVOCALIZATION: 0
CONSOLABILITY: 0
BREATHING: 0
CONSOLABILITY: 0
CONSOLABILITY: 0
BODYLANGUAGE: 0
TOTALSCORE: 0
TOTALSCORE: 0
NEGVOCALIZATION: 0
FACIALEXPRESSION: 0
BODYLANGUAGE: 0
NEGVOCALIZATION: 0
TOTALSCORE: 0
BREATHING: 0
NEGVOCALIZATION: 0
NEGVOCALIZATION: 0
TOTALSCORE: 0
FACIALEXPRESSION: 0
FACIALEXPRESSION: 0
BREATHING: 0
CONSOLABILITY: 0
BODYLANGUAGE: 0
FACIALEXPRESSION: 0
TOTALSCORE: 0
FACIALEXPRESSION: 0
BREATHING: 0
NEGVOCALIZATION: 0
CONSOLABILITY: 0

## 2020-04-12 ASSESSMENT — PAIN SCALES - WONG BAKER
WONGBAKER_NUMERICALRESPONSE: 0

## 2020-04-12 ASSESSMENT — PAIN SCALES - GENERAL
PAINLEVEL_OUTOF10: 0

## 2020-04-12 NOTE — PROGRESS NOTES
Communicated with daughter Audi Gonzalze for updates. Daughter asked if it will be okay for patient's grandaugther and best friend to visit patient. After communicated with charge nurse, daughter was informed the only person allow to visit the patient is the daughter Audi Gonzalez.  Family manifested understanding

## 2020-04-12 NOTE — PLAN OF CARE
Problem: IP BLADDER/VOIDING  Goal: STG - Patient demonstrates no accidents  4/12/2020 1047 by Jonas Reyes RN  Outcome: Ongoing  4/12/2020 0048 by Saskia Goodwin RN  Outcome: Ongoing     Problem: IP BOWEL ELIMINATION  Goal: STG - Patient participates in bowel management program  4/12/2020 1047 by Jonas Reyes RN  Outcome: Ongoing  4/12/2020 0048 by Saskia Goodwin RN  Outcome: Ongoing     Problem: NUTRITION  Goal: Patient maintains adequate hydration  4/12/2020 1047 by Jonas Reyes RN  Outcome: Ongoing  4/12/2020 0048 by Saskia Goodwin RN  Outcome: Ongoing     Problem: SAFETY  Goal: LTG - Patient will demonstrate safety requirements appropriate to situation/environment  4/12/2020 1047 by Jonas Reyes RN  Outcome: Ongoing  4/12/2020 0048 by Saskia Goodwin RN  Outcome: Ongoing     Problem: SKIN INTEGRITY  Goal: STG - patient will maintain good skin integrity  4/12/2020 1047 by Jonas Reyes RN  Outcome: Ongoing  4/12/2020 0048 by Saskia Goodwin RN  Outcome: Ongoing  Goal: STG - patient demonstrates pressure reduction techniques  4/12/2020 1047 by Jonas Reyes RN  Outcome: Ongoing  4/12/2020 0048 by Saskia Goodwin RN  Outcome: Ongoing     Problem: PAIN  Goal: LTG - Patient will demonstrate intervention for managing pain  4/12/2020 1047 by Jonas Reyes RN  Outcome: Ongoing  4/12/2020 0048 by Saskia Goodwin RN  Outcome: Ongoing  Goal: STG - pain is manageable through therapies  4/12/2020 1047 by Jonas Reyes RN  Outcome: Ongoing  4/12/2020 0048 by Saskia Goodwin RN  Outcome: Ongoing     Problem: Falls - Risk of:  Goal: Will remain free from falls  Description: Will remain free from falls  4/12/2020 1047 by Jonas Reyes RN  Outcome: Ongoing  4/12/2020 0048 by Saskia Goodwin RN  Outcome: Ongoing  Goal: Absence of physical injury  Description: Absence of physical injury  4/12/2020 1047 by Jonas Reyes RN  Outcome: Ongoing  4/12/2020 0048 by Saskia Goodwin RN  Outcome: Ongoing     Problem:

## 2020-04-13 LAB
ANION GAP SERPL CALCULATED.3IONS-SCNC: 11 MMOL/L (ref 3–16)
BUN BLDV-MCNC: 7 MG/DL (ref 7–20)
CALCIUM SERPL-MCNC: 9 MG/DL (ref 8.3–10.6)
CHLORIDE BLD-SCNC: 94 MMOL/L (ref 99–110)
CO2: 22 MMOL/L (ref 21–32)
CREAT SERPL-MCNC: 0.6 MG/DL (ref 0.6–1.2)
GFR AFRICAN AMERICAN: >60
GFR NON-AFRICAN AMERICAN: >60
GLUCOSE BLD-MCNC: 105 MG/DL (ref 70–99)
HCT VFR BLD CALC: 38.6 % (ref 36–48)
HEMOGLOBIN: 12.8 G/DL (ref 12–16)
MAGNESIUM: 2 MG/DL (ref 1.8–2.4)
MCH RBC QN AUTO: 31.6 PG (ref 26–34)
MCHC RBC AUTO-ENTMCNC: 33.1 G/DL (ref 31–36)
MCV RBC AUTO: 95.4 FL (ref 80–100)
PDW BLD-RTO: 14.3 % (ref 12.4–15.4)
PLATELET # BLD: 410 K/UL (ref 135–450)
PMV BLD AUTO: 8.2 FL (ref 5–10.5)
POTASSIUM SERPL-SCNC: 4.2 MMOL/L (ref 3.5–5.1)
RBC # BLD: 4.05 M/UL (ref 4–5.2)
SODIUM BLD-SCNC: 127 MMOL/L (ref 136–145)
WBC # BLD: 8.6 K/UL (ref 4–11)

## 2020-04-13 PROCEDURE — 85027 COMPLETE CBC AUTOMATED: CPT

## 2020-04-13 PROCEDURE — 6360000002 HC RX W HCPCS: Performed by: PHYSICAL MEDICINE & REHABILITATION

## 2020-04-13 PROCEDURE — 1280000000 HC REHAB R&B

## 2020-04-13 PROCEDURE — 2580000003 HC RX 258: Performed by: PHYSICAL MEDICINE & REHABILITATION

## 2020-04-13 PROCEDURE — 97530 THERAPEUTIC ACTIVITIES: CPT

## 2020-04-13 PROCEDURE — 97535 SELF CARE MNGMENT TRAINING: CPT

## 2020-04-13 PROCEDURE — 6370000000 HC RX 637 (ALT 250 FOR IP): Performed by: PHYSICAL MEDICINE & REHABILITATION

## 2020-04-13 PROCEDURE — 92526 ORAL FUNCTION THERAPY: CPT

## 2020-04-13 PROCEDURE — 97130 THER IVNTJ EA ADDL 15 MIN: CPT

## 2020-04-13 PROCEDURE — 97129 THER IVNTJ 1ST 15 MIN: CPT

## 2020-04-13 PROCEDURE — 80048 BASIC METABOLIC PNL TOTAL CA: CPT

## 2020-04-13 PROCEDURE — 36415 COLL VENOUS BLD VENIPUNCTURE: CPT

## 2020-04-13 PROCEDURE — 83735 ASSAY OF MAGNESIUM: CPT

## 2020-04-13 RX ADMIN — Medication 10 ML: at 08:35

## 2020-04-13 RX ADMIN — DILTIAZEM HYDROCHLORIDE 30 MG: 60 TABLET, FILM COATED ORAL at 08:32

## 2020-04-13 RX ADMIN — DESMOPRESSIN ACETATE 40 MG: 0.2 TABLET ORAL at 20:37

## 2020-04-13 RX ADMIN — BACLOFEN 5 MG: 10 TABLET ORAL at 13:46

## 2020-04-13 RX ADMIN — PIPERACILLIN AND TAZOBACTAM 3.38 G: 3; .375 INJECTION, POWDER, LYOPHILIZED, FOR SOLUTION INTRAVENOUS at 06:29

## 2020-04-13 RX ADMIN — PIPERACILLIN AND TAZOBACTAM 3.38 G: 3; .375 INJECTION, POWDER, LYOPHILIZED, FOR SOLUTION INTRAVENOUS at 15:20

## 2020-04-13 RX ADMIN — DILTIAZEM HYDROCHLORIDE 30 MG: 60 TABLET, FILM COATED ORAL at 13:46

## 2020-04-13 RX ADMIN — PIPERACILLIN AND TAZOBACTAM 3.38 G: 3; .375 INJECTION, POWDER, LYOPHILIZED, FOR SOLUTION INTRAVENOUS at 23:41

## 2020-04-13 RX ADMIN — BACLOFEN 5 MG: 10 TABLET ORAL at 20:36

## 2020-04-13 RX ADMIN — LOSARTAN POTASSIUM 100 MG: 100 TABLET, FILM COATED ORAL at 08:32

## 2020-04-13 RX ADMIN — SERTRALINE 25 MG: 50 TABLET, FILM COATED ORAL at 08:31

## 2020-04-13 RX ADMIN — BACLOFEN 5 MG: 10 TABLET ORAL at 08:32

## 2020-04-13 RX ADMIN — LANSOPRAZOLE 30 MG: 30 TABLET, ORALLY DISINTEGRATING ORAL at 05:40

## 2020-04-13 RX ADMIN — ASPIRIN 81 MG 81 MG: 81 TABLET ORAL at 08:32

## 2020-04-13 RX ADMIN — DILTIAZEM HYDROCHLORIDE 30 MG: 60 TABLET, FILM COATED ORAL at 03:06

## 2020-04-13 RX ADMIN — DILTIAZEM HYDROCHLORIDE 30 MG: 60 TABLET, FILM COATED ORAL at 20:36

## 2020-04-13 RX ADMIN — Medication 10 ML: at 23:41

## 2020-04-13 ASSESSMENT — PAIN SCALES - GENERAL
PAINLEVEL_OUTOF10: 0

## 2020-04-13 NOTE — CARE COORDINATION
Called pt's dtr, Lianne Javier to discuss pt's need of two person assist and possibly needing a SNF and two negative COVID tests d/t PNA dx. Lianne Javier to discuss with family and call this worker back. All questions answered and support and provided.  Love Carranza, MSW, LSW

## 2020-04-13 NOTE — PATIENT CARE CONFERENCE
Supervision or touching assistance  Chair/Bed-to-Chair Transfer  Assistance Needed: Dependent  CARE Score: 1  Discharge Goal: Supervision or touching assistance  Car Transfer  Reason if not Attempted: Not attempted due to medical condition or safety concerns  CARE Score: 88                          Wheelchair Ability  Uses a Wheelchair and/or Scooter?: Yes    SPEECH THERAPY:    Diet Level:Dietary Nutrition Supplements: Frozen Oral Supplement  DIET DYSPHAGIA PUREED; Mildly Thick (Nectar); Daily Fluid Restriction: 1500 ml    Assessment: Pt with guarded progress towards goals. Pt minimally accepts compensatory strategies/ aspiration precautions and passive in completing restorative treatments. Pt with improving awareness and less noted anterior loss after meals but oral phase remains moderately prolonged with soft solids. Continue to trial ability to advance diet as pt does not like puree foods. Increased expiratory wheezing is noted with trials of thin liquids. Cognition / awareness of deficits remains limited but appears more related to acceptance and/or psychological state. OCCUPATIONAL THERAPY:    ADL:   ADL  Feeding: Setup, Stand by assistance(complete set up/ stand by to stabilize bowls during scooping)  Grooming: Setup(comb hair seated in chair)  UE Bathing: Maximum assistance  LE Bathing: Maximum assistance  UE Dressing: Maximum assistance  LE Dressing: Dependent/Total  Toileting: Dependent/Total  Additional Comments: Toilet Transfers: Toilet Transfers  Toilet - Technique: Stand pivot  Equipment Used: Standard bedside commode  Toilet Transfer: 2 Person assistance  Toilet Transfers Comments: Max A x2    Tub/ShowerTransfers:     Shower Transfers  Shower - Transfer From: Wheelchair  Shower - Transfer Type: To and From  Shower - Transfer To: Other  Shower - Technique:  To right  Shower Transfers: Dependent  Shower Transfers Comments: Max A x2>BSC plaed in shower     QM:  Eating  Assistance Needed: Setup pending pt progress/discharge plan  Factors facilitating achievement of predicted outcomes: Family support  Barriers to the achievement of predicted outcomes: Long standing deficits d/t CVA, decreased motivation for participation   Patient Goals:\"to transfer on my own again\", \"To go home\"    Rehab Team Members in attendance for Team Conference:  Hamlet Lucas, MSW, LSW  Myrna Cruz, RD, LD    Moshe Claire, Neuropsychologist    Justine Foster, OTR/L    Isabella Otero, PT, DPT    Colonel Jessee M.A., [address] [place] [address] TRUDI Zavala, [address] [place] [address],     I approve the established interdisciplinary plan of care as documented within the medical record of [facility].     Ange Hare MD  Electronically signed by Ange Hare MD on 4/15/2020 at 9:18 AM

## 2020-04-13 NOTE — CARE COORDINATION
Faxed clinicals to [facility] for extended stay review.     Fax #:  [phone]

## 2020-04-13 NOTE — PROGRESS NOTES
Nancy Velez  4/13/2020  [id number]    Chief Complaint: Acute ischemic stroke ([address]    Subjective:   No overnight events. No current complaints. Low level of function. More interactive this am. Sodium 127. ROS: No CP, SOB, dyspnea    Objective:  Patient Vitals for the past 24 hrs:   BP Temp Temp src Pulse Resp SpO2 Weight   04/13/20 0829 (!) 148/81 98.9 °F (37.2 °C) Oral 60 22 -- --   04/13/20 0701 -- -- -- -- -- -- 141 lb 3.2 oz (64 kg)   04/13/20 0255 (!) 175/83 -- -- 59 16 -- --   04/12/20 2015 (!) 162/70 98.1 °F (36.7 °C) Oral 60 16 94 % --   04/12/20 1353 (!) 155/75 -- -- 57 -- -- --     Gen: No distress, pleasant. Resting in bed  HEENT: Normocephalic, atraumatic  CV: Regular rate and rhythm. No MRG   Resp: No respiratory distress. Diminished BS at BLL  Abd: Soft, nontender nondistended  Ext: No edema  Neuro: Alert, oriented, increased interaction. Dysarthria. LUE contracted with 0/5 strength and unable to fully abduct shoulder, extend elbow, or extend fingers and wrist due to pain    Laboratory data: Available via EMR. Therapy progress:  PT  Position Activity Restriction  Other position/activity restrictions: 80 y.o. female patient presenting by EMS from home. Patient lives with her daughter and son-in-law. Patient in usual state of health on Tuesday. Yesterday morning, Wednesday she woke with altered mental status which has progressed. Phone call to PCP indicating concern for UTI. Requested UA. UA not obtained. Cipro initiated. I did speak with daughter Kinjal River at [phone] who indicated she had similar presentation in October 1, 2017 at which time she was admitted with altered mental status secondary to UTI. She was admitted to ICU because of hypotension. At this time the patient is combative and confused. According to daughter Kinjal River does indicate that this is a clear deviation from her baseline.   Objective     Sit to Stand: Maximum Assistance  Stand to sit: Maximum Assistance  Bed to

## 2020-04-13 NOTE — PROGRESS NOTES
Balance  Time: ~5 min total  Activity: Transfers, ADL completion   Functional Mobility  Functional - Mobility Device: Wheelchair  Activity: To/From therapy gym  Assist Level: Stand by assistance  Toilet Transfers  Toilet - Technique: Stand pivot  Equipment Used: Standard toilet  Toilet Transfer: 2 Person assistance  Toilet Transfers Comments: Max A x2    Bed mobility  Sit to Supine: 2 Person assistance;Dependent/Total  Transfers  Stand Pivot Transfers: Dependent/Total(Max A x2)  Sit to stand: Maximum assistance  Stand to sit: Maximum assistance  Transfer Comments: Pt completed 4 sit>stand transfers in parallel bars, Max A x1 to achieve stance, Max A x2 to maintain stance. Pt w/ severe R lateral lean, assist from this writer for upright posture and L weight shift. Facilitation at triceps for UE support. Each stance ~45 seconds       Cognition  Overall Cognitive Status: Exceptions  Arousal/Alertness: Delayed responses to stimuli  Following Commands:  Follows one step commands with increased time  Attention Span: Difficulty attending to directions  Memory: Decreased recall of recent events;Decreased short term memory  Safety Judgement: Decreased awareness of need for assistance;Decreased awareness of need for safety  Problem Solving: Assistance required to generate solutions;Assistance required to implement solutions  Insights: Decreased awareness of deficits  Initiation: Requires cues for some  Sequencing: Requires cues for some      Plan   Plan  Times per week: 60 min; 5-7x week   Times per day: Daily  Current Treatment Recommendations: Functional Mobility Training, Strengthening, Endurance Training, Balance Training, Safety Education & Training, Equipment Evaluation, Education, & procurement, Patient/Caregiver Education & Training, Self-Care / ADL, Neuromuscular Re-education, Cognitive Reorientation    Goals  Short term goals  Time Frame for Short term goals: 3 weeks   Short term goal 1: Functional transfer for ADL

## 2020-04-14 LAB
ANION GAP SERPL CALCULATED.3IONS-SCNC: 8 MMOL/L (ref 3–16)
BILIRUBIN URINE: NEGATIVE
BLOOD, URINE: NEGATIVE
BUN BLDV-MCNC: 6 MG/DL (ref 7–20)
CALCIUM SERPL-MCNC: 9 MG/DL (ref 8.3–10.6)
CHLORIDE BLD-SCNC: 95 MMOL/L (ref 99–110)
CLARITY: ABNORMAL
CO2: 26 MMOL/L (ref 21–32)
COLOR: YELLOW
CREAT SERPL-MCNC: 0.7 MG/DL (ref 0.6–1.2)
CRYSTALS, UA: ABNORMAL /HPF
EPITHELIAL CELLS, UA: 5 /HPF (ref 0–5)
GFR AFRICAN AMERICAN: >60
GFR NON-AFRICAN AMERICAN: >60
GLUCOSE BLD-MCNC: 106 MG/DL (ref 70–99)
GLUCOSE URINE: NEGATIVE MG/DL
HYALINE CASTS: 1 /LPF (ref 0–8)
KETONES, URINE: NEGATIVE MG/DL
LEUKOCYTE ESTERASE, URINE: ABNORMAL
MICROSCOPIC EXAMINATION: YES
NITRITE, URINE: NEGATIVE
PH UA: 7 (ref 5–8)
POTASSIUM SERPL-SCNC: 4.3 MMOL/L (ref 3.5–5.1)
PROTEIN UA: NEGATIVE MG/DL
RBC UA: 3 /HPF (ref 0–4)
SODIUM BLD-SCNC: 129 MMOL/L (ref 136–145)
SPECIFIC GRAVITY UA: 1.02 (ref 1–1.03)
URINE REFLEX TO CULTURE: YES
URINE TYPE: ABNORMAL
UROBILINOGEN, URINE: 0.2 E.U./DL
WBC UA: 6 /HPF (ref 0–5)
YEAST: PRESENT /HPF

## 2020-04-14 PROCEDURE — 97130 THER IVNTJ EA ADDL 15 MIN: CPT

## 2020-04-14 PROCEDURE — 92526 ORAL FUNCTION THERAPY: CPT

## 2020-04-14 PROCEDURE — 87086 URINE CULTURE/COLONY COUNT: CPT

## 2020-04-14 PROCEDURE — 97535 SELF CARE MNGMENT TRAINING: CPT

## 2020-04-14 PROCEDURE — 97129 THER IVNTJ 1ST 15 MIN: CPT

## 2020-04-14 PROCEDURE — 97530 THERAPEUTIC ACTIVITIES: CPT

## 2020-04-14 PROCEDURE — 6360000002 HC RX W HCPCS: Performed by: PHYSICAL MEDICINE & REHABILITATION

## 2020-04-14 PROCEDURE — 1280000000 HC REHAB R&B

## 2020-04-14 PROCEDURE — 81001 URINALYSIS AUTO W/SCOPE: CPT

## 2020-04-14 PROCEDURE — 6370000000 HC RX 637 (ALT 250 FOR IP): Performed by: PHYSICAL MEDICINE & REHABILITATION

## 2020-04-14 PROCEDURE — 2580000003 HC RX 258: Performed by: PHYSICAL MEDICINE & REHABILITATION

## 2020-04-14 PROCEDURE — 94760 N-INVAS EAR/PLS OXIMETRY 1: CPT

## 2020-04-14 PROCEDURE — 80048 BASIC METABOLIC PNL TOTAL CA: CPT

## 2020-04-14 PROCEDURE — 36415 COLL VENOUS BLD VENIPUNCTURE: CPT

## 2020-04-14 RX ORDER — FLUCONAZOLE 100 MG/1
150 TABLET ORAL DAILY
Status: COMPLETED | OUTPATIENT
Start: 2020-04-14 | End: 2020-04-16

## 2020-04-14 RX ORDER — ALBUTEROL SULFATE 2.5 MG/3ML
2.5 SOLUTION RESPIRATORY (INHALATION) EVERY 4 HOURS PRN
Status: DISCONTINUED | OUTPATIENT
Start: 2020-04-14 | End: 2020-04-24 | Stop reason: HOSPADM

## 2020-04-14 RX ADMIN — DILTIAZEM HYDROCHLORIDE 30 MG: 60 TABLET, FILM COATED ORAL at 13:52

## 2020-04-14 RX ADMIN — BACLOFEN 5 MG: 10 TABLET ORAL at 10:21

## 2020-04-14 RX ADMIN — DILTIAZEM HYDROCHLORIDE 30 MG: 60 TABLET, FILM COATED ORAL at 22:31

## 2020-04-14 RX ADMIN — ACETAMINOPHEN 650 MG: 325 TABLET, FILM COATED ORAL at 22:34

## 2020-04-14 RX ADMIN — SERTRALINE 50 MG: 50 TABLET, FILM COATED ORAL at 10:22

## 2020-04-14 RX ADMIN — RIVAROXABAN 15 MG: 15 TABLET, FILM COATED ORAL at 18:15

## 2020-04-14 RX ADMIN — Medication 10 ML: at 04:50

## 2020-04-14 RX ADMIN — LOSARTAN POTASSIUM 100 MG: 100 TABLET, FILM COATED ORAL at 10:21

## 2020-04-14 RX ADMIN — ASPIRIN 81 MG 81 MG: 81 TABLET ORAL at 10:21

## 2020-04-14 RX ADMIN — DESMOPRESSIN ACETATE 40 MG: 0.2 TABLET ORAL at 22:34

## 2020-04-14 RX ADMIN — BACLOFEN 5 MG: 10 TABLET ORAL at 13:52

## 2020-04-14 RX ADMIN — LANSOPRAZOLE 30 MG: 30 TABLET, ORALLY DISINTEGRATING ORAL at 05:13

## 2020-04-14 RX ADMIN — FLUCONAZOLE 150 MG: 100 TABLET ORAL at 10:26

## 2020-04-14 RX ADMIN — Medication 10 ML: at 06:47

## 2020-04-14 RX ADMIN — DILTIAZEM HYDROCHLORIDE 30 MG: 60 TABLET, FILM COATED ORAL at 10:22

## 2020-04-14 RX ADMIN — PIPERACILLIN AND TAZOBACTAM 3.38 G: 3; .375 INJECTION, POWDER, LYOPHILIZED, FOR SOLUTION INTRAVENOUS at 22:27

## 2020-04-14 RX ADMIN — PIPERACILLIN AND TAZOBACTAM 3.38 G: 3; .375 INJECTION, POWDER, LYOPHILIZED, FOR SOLUTION INTRAVENOUS at 14:01

## 2020-04-14 RX ADMIN — Medication 10 ML: at 10:23

## 2020-04-14 RX ADMIN — Medication 10 ML: at 22:25

## 2020-04-14 RX ADMIN — DILTIAZEM HYDROCHLORIDE 30 MG: 60 TABLET, FILM COATED ORAL at 03:10

## 2020-04-14 RX ADMIN — BACLOFEN 5 MG: 10 TABLET ORAL at 22:33

## 2020-04-14 RX ADMIN — PIPERACILLIN AND TAZOBACTAM 3.38 G: 3; .375 INJECTION, POWDER, LYOPHILIZED, FOR SOLUTION INTRAVENOUS at 06:47

## 2020-04-14 ASSESSMENT — PAIN SCALES - GENERAL
PAINLEVEL_OUTOF10: 4
PAINLEVEL_OUTOF10: 0

## 2020-04-14 NOTE — PROGRESS NOTES
mildly reduced and also impacted by wet vocal quality after intake of solids. Current Diet Order: Dietary Nutrition Supplements: Frozen Oral Supplement  DIET DYSPHAGIA PUREED; Mildly Thick (Nectar); Daily Fluid Restriction: 1500 ml   Recommended Form of Meds: Crushed in puree as able(Pt reported she does not like the taste of medications )  Compensatory Swallowing Strategies: Small bites/sips, Assist feed, Remain upright for 30-45 minutes after meals, Upright as possible for all oral intake, Eat/Feed slowly     Plan:   Speech therapy 5 days wk/ 60 minutes per day    Frequency:  5days/week   60 minutes/day    Discharge Recommendations:   Barriers: cognition, awareness, insight, physical limitations   Discharge Recommendations:  [] Home independently  [x] Home with assistance []  24 hour supervision  [] SNF [] Other:  Continued SLP Treatment:  [x] Yes [] No [] TBD based on progress while on ARU [] Vital Stim indicated [] Other:   Estimated discharge date: TBD    Type of Total Treatment Minutes   Session 1   Session 2   Time In 0800 1245   Time Out 0830 1345   Timed Code Minutes  15 10   Individual Treatment Minutes  30 30   Co-Treatment Minutes      Group Treatment Minutes      Concurrent Treatment Minutes        TOTAL DAILY MINUTES:  60 minutes    Electronically Signed by     Colonel Jessee M.A.  [facility]-SLP BRENT [id number]  Speech-Language Pathologist [phone]  4/14/2020 2:39 PM

## 2020-04-14 NOTE — PROGRESS NOTES
Physical Therapy  Facility/Department: [facility] ACUTE REHAB UNIT  Daily Treatment Note  NAME: Tiara Romero  : 11/3/1933  MRN: [id number]    Date of Service: 2020    Discharge Recommendations:  24 hour supervision or assist, Home with Home health PT, S Level 4   PT Equipment Recommendations  Equipment Needed: Yes  Mobility Devices: Hospital Bed;Transport Devices  Transport Devices: Patient [address] Bed : Bed Rails - Partial  Other: marty lift    Assessment   Body structures, Functions, Activity limitations: Decreased functional mobility ; Decreased endurance;Decreased cognition;Decreased strength;Decreased ROM; Decreased balance;Decreased posture  Assessment: Pt continues to require assist of 2 people for all functional activity, including transfers. Pt remained with decreased standing tolerance and altered mechanics bilaterally. Treatment Diagnosis: impaired mobility and balance  Prognosis: Fair  PT Education: Transfer Training;Functional Mobility Training;Pressure Relief;Goals;PT Role;Plan of Care;Weight-bearing Education;Equipment  Patient Education: Pt requires reinforcement of education. Importance of out of bed activity to increase strength. Barriers to Learning: cognition  REQUIRES PT FOLLOW UP: Yes  Activity Tolerance  Activity Tolerance: Patient limited by fatigue;Patient limited by endurance;Treatment limited secondary to agitation     Patient Diagnosis(es): CVA. has a past medical history of Cerebral artery occlusion with cerebral infarction [facility]), Cerebral vascular disease, Hyperlipidemia, Hypertension, Peripheral vascular disease ([facility] [address] and Urinary incontinence. has a past surgical history that includes  section; Hysterectomy; and Carotid endarterectomy (Right, 2005). Restrictions  Restrictions/Precautions  Restrictions/Precautions: Fall Risk  Required Braces or Orthoses?: No  Position Activity Restriction  Other position/activity restrictions: 80 y.o. Minutes:  54  6 minute variance due to pt refusal      Jelena Hernandez, [address] [facility], DPT [id number]

## 2020-04-14 NOTE — CARE COORDINATION
Met with pt's spouse, dtrs, and son in law along with therapy and RN to discuss pt's discharge plans and current status. They are interested in pt going to a [facility] Medicare SNF at discharge and having a [facility] goals of care conversation to get them on board, this worker emailed referral. Family wants to make pt a DNR-CC, MD message sent to see if pt has the capacity to decide this or if family can. Awaiting response.  Olive Pulido, MSW, LSW

## 2020-04-14 NOTE — PROGRESS NOTES
Occupational Therapy  Facility/Department: [facility] ACUTE REHAB UNIT  Daily Treatment Note  NAME: Heena Watts  : 11/3/1933  MRN: [id number]    Date of Service: 2020    Discharge Recommendations:  Continue to assess pending progress, Patient would benefit from continued therapy after discharge  OT Equipment Recommendations  Equipment Needed: No  Other: Continue to assess pending pt progress    Assessment   Performance deficits / Impairments: Decreased functional mobility ; Decreased strength;Decreased endurance;Decreased ADL status; Decreased safe awareness;Decreased cognition;Decreased balance;Decreased fine motor control;Decreased posture;Decreased coordination  Assessment: Pt presents w/ the above deficits which are impacting her occupational performance. Pt would benefit from continued therapy during inpatient stay in order to maximize safety and independence upon discharge. Treatment Diagnosis: Multiple performance deficits associated w/ CVA  OT Education: OT Role;Plan of Care;ADL Adaptive Strategies;Transfer Training;Equipment  Patient Education: Pt verbalized understanding but will require continued reinforcement to promote carryover  Barriers to Learning: Cognition, motivation, agitation  REQUIRES OT FOLLOW UP: Yes  Activity Tolerance  Activity Tolerance: Patient limited by pain; Patient limited by fatigue;Treatment limited secondary to decreased cognition  Activity Tolerance: Pt w/ difficulty incorporating this writer's feedback into therapy. Pt stating that she wanted to go back to the room and did not want to continue w/ the session. Safety Devices  Safety Devices in place: Yes  Type of devices: All fall risk precautions in place;Call light within reach;Gait belt;Nurse notified; Chair alarm in place; Left in chair         Patient Diagnosis(es): CVA     has a past medical history of Cerebral artery occlusion with cerebral infarction [facility]), Cerebral vascular disease, Hyperlipidemia, Hypertension,

## 2020-04-15 LAB
ANION GAP SERPL CALCULATED.3IONS-SCNC: 10 MMOL/L (ref 3–16)
BUN BLDV-MCNC: 8 MG/DL (ref 7–20)
CALCIUM SERPL-MCNC: 9 MG/DL (ref 8.3–10.6)
CHLORIDE BLD-SCNC: 94 MMOL/L (ref 99–110)
CO2: 26 MMOL/L (ref 21–32)
CREAT SERPL-MCNC: 0.8 MG/DL (ref 0.6–1.2)
GFR AFRICAN AMERICAN: >60
GFR NON-AFRICAN AMERICAN: >60
GLUCOSE BLD-MCNC: 96 MG/DL (ref 70–99)
HCT VFR BLD CALC: 36.7 % (ref 36–48)
HEMOGLOBIN: 12.3 G/DL (ref 12–16)
MAGNESIUM: 2 MG/DL (ref 1.8–2.4)
MCH RBC QN AUTO: 31.5 PG (ref 26–34)
MCHC RBC AUTO-ENTMCNC: 33.5 G/DL (ref 31–36)
MCV RBC AUTO: 94.3 FL (ref 80–100)
ORGANISM: ABNORMAL
PDW BLD-RTO: 14.5 % (ref 12.4–15.4)
PLATELET # BLD: 414 K/UL (ref 135–450)
PMV BLD AUTO: 8.3 FL (ref 5–10.5)
POTASSIUM SERPL-SCNC: 4.3 MMOL/L (ref 3.5–5.1)
RBC # BLD: 3.9 M/UL (ref 4–5.2)
SODIUM BLD-SCNC: 130 MMOL/L (ref 136–145)
URINE CULTURE, ROUTINE: ABNORMAL
WBC # BLD: 8.4 K/UL (ref 4–11)

## 2020-04-15 PROCEDURE — 97530 THERAPEUTIC ACTIVITIES: CPT

## 2020-04-15 PROCEDURE — 97130 THER IVNTJ EA ADDL 15 MIN: CPT

## 2020-04-15 PROCEDURE — 80048 BASIC METABOLIC PNL TOTAL CA: CPT

## 2020-04-15 PROCEDURE — 97129 THER IVNTJ 1ST 15 MIN: CPT

## 2020-04-15 PROCEDURE — 1280000000 HC REHAB R&B

## 2020-04-15 PROCEDURE — 97535 SELF CARE MNGMENT TRAINING: CPT

## 2020-04-15 PROCEDURE — 83735 ASSAY OF MAGNESIUM: CPT

## 2020-04-15 PROCEDURE — 92526 ORAL FUNCTION THERAPY: CPT

## 2020-04-15 PROCEDURE — 2580000003 HC RX 258: Performed by: PHYSICAL MEDICINE & REHABILITATION

## 2020-04-15 PROCEDURE — 85027 COMPLETE CBC AUTOMATED: CPT

## 2020-04-15 PROCEDURE — 6370000000 HC RX 637 (ALT 250 FOR IP): Performed by: PHYSICAL MEDICINE & REHABILITATION

## 2020-04-15 RX ADMIN — RIVAROXABAN 15 MG: 15 TABLET, FILM COATED ORAL at 16:31

## 2020-04-15 RX ADMIN — DILTIAZEM HYDROCHLORIDE 30 MG: 60 TABLET, FILM COATED ORAL at 21:39

## 2020-04-15 RX ADMIN — DESMOPRESSIN ACETATE 40 MG: 0.2 TABLET ORAL at 21:39

## 2020-04-15 RX ADMIN — ASPIRIN 81 MG 81 MG: 81 TABLET ORAL at 08:10

## 2020-04-15 RX ADMIN — BACLOFEN 5 MG: 10 TABLET ORAL at 08:10

## 2020-04-15 RX ADMIN — LOSARTAN POTASSIUM 100 MG: 100 TABLET, FILM COATED ORAL at 08:10

## 2020-04-15 RX ADMIN — SERTRALINE 50 MG: 50 TABLET, FILM COATED ORAL at 08:10

## 2020-04-15 RX ADMIN — FLUCONAZOLE 150 MG: 100 TABLET ORAL at 08:10

## 2020-04-15 RX ADMIN — DILTIAZEM HYDROCHLORIDE 30 MG: 60 TABLET, FILM COATED ORAL at 16:20

## 2020-04-15 RX ADMIN — Medication 10 ML: at 08:10

## 2020-04-15 RX ADMIN — Medication 10 ML: at 21:39

## 2020-04-15 RX ADMIN — DILTIAZEM HYDROCHLORIDE 30 MG: 60 TABLET, FILM COATED ORAL at 08:09

## 2020-04-15 RX ADMIN — LANSOPRAZOLE 30 MG: 30 TABLET, ORALLY DISINTEGRATING ORAL at 05:40

## 2020-04-15 ASSESSMENT — PAIN SCALES - GENERAL
PAINLEVEL_OUTOF10: 0

## 2020-04-15 NOTE — PROGRESS NOTES
01/07/2005). Restrictions  Restrictions/Precautions  Restrictions/Precautions: Fall Risk  Required Braces or Orthoses?: No  Position Activity Restriction  Other position/activity restrictions: 80 y.o. female patient presenting by EMS from home. Patient lives with her daughter and son-in-law. Patient in usual state of health on Tuesday. Yesterday morning, Wednesday she woke with altered mental status which has progressed. Phone call to PCP indicating concern for UTI. Requested UA. UA not obtained. Cipro initiated. I did speak with daughter Александр Baker at [phone] who indicated she had similar presentation in October 1, 2017 at which time she was admitted with altered mental status secondary to UTI. She was admitted to ICU because of hypotension. At this time the patient is combative and confused. According to daughter Александр Baker does indicate that this is a clear deviation from her baseline. Subjective   General  Chart Reviewed: Yes  Patient assessed for rehabilitation services?: Yes(RN)  Additional Pertinent Hx:    Response to previous treatment: Patient with no complaints from previous session  Family / Caregiver Present: No  Referring Practitioner: Denia Conrad MD  Diagnosis: CVA  Subjective  Subjective: Pt seated in chair upon entry, agreeable to OT/PT cotx w/ encouragement. Pt stating \"I better work on walking today\"   Vital Signs  Patient Currently in Pain: Denies     Objective    ADL  Grooming: Setup(to comb hair)  UE Dressing: Maximum assistance  LE Dressing: Dependent/Total  Toileting: Dependent/Total  Additional Comments: SPT from recliner>w/c>commode. Pt incontinent of urine in brief. Completed UB/LB dressing while seated on commode. Stood pt to complete pericare but pt was again incontinent of urine. Complete LB clothing change. SPT>w/c.  Pt completed grooming tasks at sink      Balance  Sitting Balance: Contact guard assistance  Standing Balance: Dependent/Total  Standing Balance  Time: ~1 min Activity: Transfers, ADL completion   Functional Mobility  Functional - Mobility Device: Wheelchair  Activity: To/From therapy gym  Assist Level: Stand by assistance  Functional Mobility Comments: ~150ft in halls    Toilet Transfers  Toilet - Technique: Stand pivot  Equipment Used: Standard bedside commode  Toilet Transfer: 2 Person assistance  Toilet Transfers Comments: Max A x2     Transfers  Sit to stand: 2 Person assistance  Stand to sit: 2 Person assistance      Cognition  Overall Cognitive Status: Exceptions  Arousal/Alertness: Delayed responses to stimuli  Following Commands: Follows one step commands with increased time  Attention Span: Difficulty attending to directions  Memory: Decreased recall of recent events;Decreased short term memory  Safety Judgement: Decreased awareness of need for assistance;Decreased awareness of need for safety  Problem Solving: Assistance required to generate solutions;Assistance required to implement solutions  Insights: Decreased awareness of deficits  Initiation: Requires cues for some  Sequencing: Requires cues for some  Cognition Comment: Pt w/ limited insight into how deficits will impact function at home       Second Session: Pt supine in bed upon entry, agreeable to OT/PT w/ encouragement. Supine>sit=CGA. Scooting=Min A. SPT>w/c>commode=Max A x2. Pt incontinent of urine. Voided urine/BM while seated on commode. Dependent for toileting. Dependent for LB dressing. Grooming completed at w/c level. Attempted 2 sit>stand transfers in parallel bars w/ Max A x2. Pt unable to achieve full stance d/t R lateral lean and hyperextension of LE. Attempted 2 sit>stand transfers at ballet barre w/ bilateral knee block and Max A x2. Each stances=~30 seconds total. Pt continues to demo a lack of insight into how deficits translate to difficulties w/ everyday tasks. Pt transported back to room at end of session, seated in w/c w/ alarm on, needs within reach.        Plan   Plan  Times per

## 2020-04-15 NOTE — PROGRESS NOTES
Endurance Training, Safety Education & Training, Positioning, Equipment Evaluation, Education, & procurement, ROM, Patient/Caregiver Education & Training  Safety Devices  Type of devices: Call light within reach, Nurse notified, Gait belt, All fall risk precautions in place, Chair alarm in place, Left in chair  Restraints  Initially in place: No     Therapy Time   Individual Concurrent Group Co-treatment   Time In       0830   Time Out       0900   Minutes       30        Second Session Therapy Time:   Individual Concurrent Group Co-treatment   Time In       1124   Time Out       1154   Minutes       30     Timed Code Treatment Minutes:  30+30    Total Treatment Minutes:  [address], [place] [address]

## 2020-04-15 NOTE — CARE COORDINATION
Spoke with pt's dtr, Fabian López [phone] and she would like pt to discharge to in hospice with Farideh at [facility]. Fabian López to call sister, Jabari Joel and their father to discuss. Nawaf Yu RN at [facility] and left voicemail.  Ry Snow, MSW, LSW

## 2020-04-15 NOTE — CARE COORDINATION
[facility] approved extended stay.  NRD 4/21    Fax #: [phone]  Auth #: [id number]

## 2020-04-15 NOTE — PROGRESS NOTES
Time In 0800 1245   Time Out 0830 1300   Timed Code Minutes  15 15   Individual Treatment Minutes  30 15   Co-Treatment Minutes      Group Treatment Minutes      Concurrent Treatment Minutes        TOTAL DAILY MINUTES:  45 minutes  Time missed secondary to pt wanting to return to bed. Per SW, pt family plans to meet with hospice for more conservative medical management. Electronically Signed by     Autumn Vasquez M.A.  [facility]-SLP BRENT [id number]  Speech-Language Pathologist [phone]  4/15/2020 9:20 AM

## 2020-04-16 LAB
ANION GAP SERPL CALCULATED.3IONS-SCNC: 9 MMOL/L (ref 3–16)
BUN BLDV-MCNC: 8 MG/DL (ref 7–20)
CALCIUM SERPL-MCNC: 9.1 MG/DL (ref 8.3–10.6)
CHLORIDE BLD-SCNC: 98 MMOL/L (ref 99–110)
CO2: 25 MMOL/L (ref 21–32)
CREAT SERPL-MCNC: 0.7 MG/DL (ref 0.6–1.2)
GFR AFRICAN AMERICAN: >60
GFR NON-AFRICAN AMERICAN: >60
GLUCOSE BLD-MCNC: 94 MG/DL (ref 70–99)
POTASSIUM SERPL-SCNC: 4.4 MMOL/L (ref 3.5–5.1)
SODIUM BLD-SCNC: 132 MMOL/L (ref 136–145)

## 2020-04-16 PROCEDURE — 36415 COLL VENOUS BLD VENIPUNCTURE: CPT

## 2020-04-16 PROCEDURE — 1280000000 HC REHAB R&B

## 2020-04-16 PROCEDURE — 97530 THERAPEUTIC ACTIVITIES: CPT

## 2020-04-16 PROCEDURE — 2580000003 HC RX 258: Performed by: PHYSICAL MEDICINE & REHABILITATION

## 2020-04-16 PROCEDURE — 80048 BASIC METABOLIC PNL TOTAL CA: CPT

## 2020-04-16 PROCEDURE — 97535 SELF CARE MNGMENT TRAINING: CPT

## 2020-04-16 PROCEDURE — 6370000000 HC RX 637 (ALT 250 FOR IP): Performed by: PHYSICAL MEDICINE & REHABILITATION

## 2020-04-16 RX ADMIN — DILTIAZEM HYDROCHLORIDE 30 MG: 60 TABLET, FILM COATED ORAL at 14:45

## 2020-04-16 RX ADMIN — DILTIAZEM HYDROCHLORIDE 30 MG: 60 TABLET, FILM COATED ORAL at 01:41

## 2020-04-16 RX ADMIN — Medication 10 ML: at 20:18

## 2020-04-16 RX ADMIN — ASPIRIN 81 MG 81 MG: 81 TABLET ORAL at 08:56

## 2020-04-16 RX ADMIN — DILTIAZEM HYDROCHLORIDE 30 MG: 60 TABLET, FILM COATED ORAL at 08:55

## 2020-04-16 RX ADMIN — LANSOPRAZOLE 30 MG: 30 TABLET, ORALLY DISINTEGRATING ORAL at 05:59

## 2020-04-16 RX ADMIN — DESMOPRESSIN ACETATE 40 MG: 0.2 TABLET ORAL at 20:13

## 2020-04-16 RX ADMIN — Medication 10 ML: at 08:55

## 2020-04-16 RX ADMIN — DILTIAZEM HYDROCHLORIDE 30 MG: 60 TABLET, FILM COATED ORAL at 20:12

## 2020-04-16 RX ADMIN — LOSARTAN POTASSIUM 100 MG: 100 TABLET, FILM COATED ORAL at 08:56

## 2020-04-16 RX ADMIN — RIVAROXABAN 15 MG: 15 TABLET, FILM COATED ORAL at 17:18

## 2020-04-16 RX ADMIN — FLUCONAZOLE 150 MG: 100 TABLET ORAL at 08:56

## 2020-04-16 RX ADMIN — SERTRALINE 50 MG: 50 TABLET, FILM COATED ORAL at 08:56

## 2020-04-16 ASSESSMENT — PAIN SCALES - GENERAL
PAINLEVEL_OUTOF10: 0
PAINLEVEL_OUTOF10: 0

## 2020-04-16 NOTE — PROGRESS NOTES
Occupational Therapy  Facility/Department: [facility] ACUTE REHAB UNIT  Daily Treatment Note  NAME: Sami Gamboa  : 11/3/1933  MRN: [id number]    Date of Service: 2020    Discharge Recommendations: Inpatient hospice  OT Equipment Recommendations  Equipment Needed: No    Assessment   Performance deficits / Impairments: Decreased functional mobility ; Decreased strength;Decreased endurance;Decreased ADL status; Decreased safe awareness;Decreased cognition;Decreased balance;Decreased fine motor control;Decreased posture;Decreased coordination  Assessment: Pt has made very little progress during inpatient stay. Pt has minimal motivation to participate in therapy. Pt dependent for transfers and most ADLs. Plan to go to inpatient hospice this week. Treatment Diagnosis: Multiple performance deficits associated w/ CVA  OT Education: OT Role;Plan of Care;ADL Adaptive Strategies;Transfer Training;Equipment  Patient Education: Pt verbalized understanding but will require continued reinforcement to promote carryover  Barriers to Learning: Cognition, motivation, agitation  REQUIRES OT FOLLOW UP: Yes  Activity Tolerance  Activity Tolerance: Pt limited by decreased motivation   Safety Devices  Safety Devices in place: Yes  Type of devices: All fall risk precautions in place;Call light within reach;Gait belt;Nurse notified; Chair alarm in place; Left in chair         Patient Diagnosis(es): CVA     has a past medical history of Cerebral artery occlusion with cerebral infarction [facility]), Cerebral vascular disease, Hyperlipidemia, Hypertension, Peripheral vascular disease ([facility] [address] and Urinary incontinence. has a past surgical history that includes  section; Hysterectomy; and Carotid endarterectomy (Right, 2005).     Restrictions  Restrictions/Precautions  Restrictions/Precautions: Fall Risk  Required Braces or Orthoses?: No  Position Activity Restriction  Other position/activity restrictions: 80 y.o. female patient Technique: Stand pivot  Equipment Used: Standard bedside commode  Toilet Transfer: 2 Person assistance;Dependent/Total    Bed mobility  Supine to Sit: Minimal assistance  Transfers  Sit to stand: 2 Person assistance  Stand to sit: 2 Person assistance        Second Session: Pt seated in w/c upon entry, requesting to get into bed. Pt refusing to go to commode to void, requesting to be changed while supine in bed. SPT>EOB=Max A x2-pt w/ little effort during transfer. Sit>supine=Max A x2. Rolling L/R=Max A. Doffed soiled brief, donned new brief. Dependent/total for LB dressing, toileting. Pt supine in bed at end of session, bed alarm on, needs within reach. Plan   Plan  Times per week: Discharge to inpatient hospice  Times per day: Daily  Current Treatment Recommendations: Functional Mobility Training, Strengthening, Endurance Training, Balance Training, Safety Education & Training, Equipment Evaluation, Education, & procurement, Patient/Caregiver Education & Training, Self-Care / ADL, Neuromuscular Re-education, Cognitive Reorientation    Goals  Short term goals  Time Frame for Short term goals: 3 weeks   Short term goal 1: Functional transfer for ADL completion w/ Min A-Goal not met (Dependent)  Short term goal 2: Bathing w/ Min A -Goal not met (Dependent)  Short term goal 3: UB dressing w/ Min A-Goal not met (Max A)  Short term goal 4: LB dressing w/ Min A-Goal not met (Dependent)  Short term goal 5: Toileting w/ Min A-Goal not met (Dependent)  Long term goals  Time Frame for Long term goals : LTG=STG  Patient Goals   Patient goals :  \"To go home\"       Therapy Time   Individual Concurrent Group Co-treatment   Time In       0730, 1030   Time Out       0810, 1040   Minutes       40, 10         Timed Code Treatment Minutes:  40 + 10   Total Treatment Minutes:  50 minutes       Otilio Fowler, OT   Otilio Fowler OTR/L, [address] [facility] [address] [place] #[id number]

## 2020-04-16 NOTE — PROGRESS NOTES
Physical Therapy  Facility/Department: [facility] ACUTE REHAB UNIT  Daily Treatment Note  NAME: Joanna Marino  : 11/3/1933  MRN: [id number]    Date of Service: 2020    Discharge Recommendations:  24 hour supervision or assist, Home with Home health PT, S Level 4   PT Equipment Recommendations  Equipment Needed: Yes  Transport Devices: Patient [address] Bed : Bed Rails - Partial  Other: marty lift    Assessment   Body structures, Functions, Activity limitations: Decreased functional mobility ; Decreased endurance;Decreased cognition;Decreased strength;Decreased ROM; Decreased balance;Decreased posture  Assessment: Pt requiring increased assistance of 2 people for all functional mobility with decreased participation in mobility. Pt remains with decreased insight into activities. Treatment Diagnosis: impaired mobility and balance  Prognosis: Fair  PT Education: Transfer Training;Functional Mobility Training;Pressure Relief;Goals;PT Role;Plan of Care;Weight-bearing Education;Equipment  Patient Education: Pt requires reinforcement of education. Importance of out of bed activity to increase strength. Barriers to Learning: cognition  REQUIRES PT FOLLOW UP: Yes  Activity Tolerance  Activity Tolerance: Patient limited by fatigue;Patient limited by endurance     Patient Diagnosis(es): CVA. has a past medical history of Cerebral artery occlusion with cerebral infarction [facility]), Cerebral vascular disease, Hyperlipidemia, Hypertension, Peripheral vascular disease ([facility] [address] and Urinary incontinence. has a past surgical history that includes  section; Hysterectomy; and Carotid endarterectomy (Right, 2005). Restrictions  Restrictions/Precautions  Restrictions/Precautions: Fall Risk  Required Braces or Orthoses?: No  Position Activity Restriction  Other position/activity restrictions: 80 y.o. female patient presenting by EMS from home. Patient lives with her daughter and son-in-law. Patient in usual state of health on Tuesday. Yesterday morning, Wednesday she woke with altered mental status which has progressed. Phone call to PCP indicating concern for UTI. Requested UA. UA not obtained. Cipro initiated. I did speak with daughter Marcus Taylor at [phone] who indicated she had similar presentation in October 1, 2017 at which time she was admitted with altered mental status secondary to UTI. She was admitted to ICU because of hypotension. At this time the patient is combative and confused. According to daughter Marcus Taylor does indicate that this is a clear deviation from her baseline. Subjective   General  Chart Reviewed: Yes  Additional Pertinent Hx: Pt is an 80 y.o. female who presented to the ED on 3/26/20 with AMS s/p fall out of w/c and hitting head. 3-31 MRI (+) Acute infarct within the left occipital lobe with possible petechial hemorrhage. Response To Previous Treatment: Patient with no complaints from previous session. Family / Caregiver Present: No  Subjective  Subjective: Pt supine in bed on arrival.  Agreeable to therapy session with encouragement.   Pain Screening  Patient Currently in Pain: Denies  Vital Signs  Patient Currently in Pain: Denies       Orientation     Cognition      Objective   Bed mobility  Scooting: Minimal assistance  Transfers  Sit to Stand: Dependent/Total;2 Person Assistance(maxA of 2 from EOB, maxA of 1 and modA of 1 with stedy)  Stand to sit: 2 Person Assistance;Dependent/Total(maxA of 2)  Bed to Chair: 2 Person Assistance;Dependent/Total(maxA of 2)  Stand Pivot Transfers: Dependent/Total;2 Person Assistance(maxA of 2 without stedy, maxA of 1 and modA of 1 with stedy)  Wheelchair Activities  Propulsion: Yes  Propulsion 1  Propulsion: Manual  Level: Level Tile  Method: RUE  Level of Assistance: Stand by assistance  Description/ Details: Patient used RUE only to self-propel w/ self correction of deviation of midline  Distance: 155'     Balance  Posture: Training, Positioning, Equipment Evaluation, Education, & procurement, ROM, Patient/Caregiver Education & Training  Safety Devices  Type of devices: Call light within reach, Nurse notified, Gait belt, All fall risk precautions in place, Chair alarm in place, Left in chair  Restraints  Initially in place: No     Therapy Time   Individual Concurrent Group Co-treatment   Time In       0730   Time Out       0810   Minutes       40        Second Session Therapy Time:   Individual Concurrent Group Co-treatment   Time In       1030   Time Out       1040   Minutes       10     Timed Code Treatment Minutes:  40+10    Total Treatment Minutes:  50  10 minute variance due to pt fatigue and refusal    Nory Saavedra [place], DPT [id number]

## 2020-04-16 NOTE — PLAN OF CARE
Problem: IP BLADDER/VOIDING  Goal: STG - Patient demonstrates no accidents  4/16/2020 0312 by Gerardo oDbson RN  Outcome: Ongoing     Problem: Falls - Risk of:  Goal: Absence of physical injury  Description: Absence of physical injury  4/16/2020 7415 by Gerardo Dobson RN  Outcome: Ongoing     Problem: Falls - Risk of:  Goal: Will remain free from falls  Description: Will remain free from falls  4/16/2020 0312 by Gerardo Dobson RN  Outcome: Ongoing

## 2020-04-17 LAB
ANION GAP SERPL CALCULATED.3IONS-SCNC: 10 MMOL/L (ref 3–16)
BUN BLDV-MCNC: 7 MG/DL (ref 7–20)
CALCIUM SERPL-MCNC: 9 MG/DL (ref 8.3–10.6)
CHLORIDE BLD-SCNC: 97 MMOL/L (ref 99–110)
CO2: 26 MMOL/L (ref 21–32)
CREAT SERPL-MCNC: 0.6 MG/DL (ref 0.6–1.2)
GFR AFRICAN AMERICAN: >60
GFR NON-AFRICAN AMERICAN: >60
GLUCOSE BLD-MCNC: 101 MG/DL (ref 70–99)
HCT VFR BLD CALC: 36.9 % (ref 36–48)
HEMOGLOBIN: 12.6 G/DL (ref 12–16)
MAGNESIUM: 1.9 MG/DL (ref 1.8–2.4)
MCH RBC QN AUTO: 32.3 PG (ref 26–34)
MCHC RBC AUTO-ENTMCNC: 34.1 G/DL (ref 31–36)
MCV RBC AUTO: 94.6 FL (ref 80–100)
PDW BLD-RTO: 14.1 % (ref 12.4–15.4)
PLATELET # BLD: 388 K/UL (ref 135–450)
PMV BLD AUTO: 8.1 FL (ref 5–10.5)
POTASSIUM SERPL-SCNC: 4.4 MMOL/L (ref 3.5–5.1)
RBC # BLD: 3.9 M/UL (ref 4–5.2)
SODIUM BLD-SCNC: 133 MMOL/L (ref 136–145)
WBC # BLD: 7.2 K/UL (ref 4–11)

## 2020-04-17 PROCEDURE — 1280000000 HC REHAB R&B

## 2020-04-17 PROCEDURE — 36415 COLL VENOUS BLD VENIPUNCTURE: CPT

## 2020-04-17 PROCEDURE — 83735 ASSAY OF MAGNESIUM: CPT

## 2020-04-17 PROCEDURE — 80048 BASIC METABOLIC PNL TOTAL CA: CPT

## 2020-04-17 PROCEDURE — 6370000000 HC RX 637 (ALT 250 FOR IP): Performed by: PHYSICAL MEDICINE & REHABILITATION

## 2020-04-17 PROCEDURE — 85027 COMPLETE CBC AUTOMATED: CPT

## 2020-04-17 PROCEDURE — 97530 THERAPEUTIC ACTIVITIES: CPT

## 2020-04-17 PROCEDURE — 97535 SELF CARE MNGMENT TRAINING: CPT

## 2020-04-17 RX ADMIN — LANSOPRAZOLE 30 MG: 30 TABLET, ORALLY DISINTEGRATING ORAL at 05:28

## 2020-04-17 RX ADMIN — ACETAMINOPHEN 650 MG: 325 TABLET, FILM COATED ORAL at 08:22

## 2020-04-17 RX ADMIN — RIVAROXABAN 15 MG: 15 TABLET, FILM COATED ORAL at 16:49

## 2020-04-17 RX ADMIN — SERTRALINE 50 MG: 50 TABLET, FILM COATED ORAL at 08:22

## 2020-04-17 RX ADMIN — DESMOPRESSIN ACETATE 40 MG: 0.2 TABLET ORAL at 21:01

## 2020-04-17 RX ADMIN — DILTIAZEM HYDROCHLORIDE 30 MG: 60 TABLET, FILM COATED ORAL at 21:01

## 2020-04-17 RX ADMIN — ASPIRIN 81 MG 81 MG: 81 TABLET ORAL at 08:22

## 2020-04-17 RX ADMIN — DILTIAZEM HYDROCHLORIDE 30 MG: 60 TABLET, FILM COATED ORAL at 01:59

## 2020-04-17 RX ADMIN — LOSARTAN POTASSIUM 100 MG: 100 TABLET, FILM COATED ORAL at 08:22

## 2020-04-17 RX ADMIN — DILTIAZEM HYDROCHLORIDE 30 MG: 60 TABLET, FILM COATED ORAL at 08:22

## 2020-04-17 RX ADMIN — DILTIAZEM HYDROCHLORIDE 30 MG: 60 TABLET, FILM COATED ORAL at 13:53

## 2020-04-17 ASSESSMENT — PAIN SCALES - GENERAL
PAINLEVEL_OUTOF10: 0

## 2020-04-17 NOTE — PROGRESS NOTES
Occupational Therapy  Facility/Department: [facility] ACUTE REHAB UNIT  Daily Treatment Note  NAME: Heena Watts  : 11/3/1933  MRN: [id number]    Date of Service: 2020    Discharge Recommendations:  (Inpatient hospice )  OT Equipment Recommendations  Equipment Needed: No  Other: Continue to assess pending pt progress    Assessment   Performance deficits / Impairments: Decreased functional mobility ; Decreased strength;Decreased endurance;Decreased ADL status; Decreased safe awareness;Decreased cognition;Decreased balance;Decreased fine motor control;Decreased posture;Decreased coordination  Assessment: Pt has made very little progress during inpatient stay. Pt has minimal motivation to participate in therapy. Pt dependent for transfers and most ADLs. Plan to go to inpatient hospice this week. Treatment Diagnosis: Multiple performance deficits associated w/ CVA  Prognosis: Fair  Decision Making: Medium Complexity  OT Education: OT Role;Plan of Care;ADL Adaptive Strategies;Transfer Training;Equipment  Patient Education: Pt verbalized understanding but will require continued reinforcement to promote carryover  REQUIRES OT FOLLOW UP: Yes  Activity Tolerance  Activity Tolerance: Patient Tolerated treatment well;Patient limited by fatigue  Activity Tolerance: increased activity tolerance noted this date  Safety Devices  Safety Devices in place: Yes  Type of devices: All fall risk precautions in place;Call light within reach; Bed alarm in place; Patient at risk for falls;Gait belt;Left in bed;Nurse notified         Patient Diagnosis(es): There were no encounter diagnoses. has a past medical history of Cerebral artery occlusion with cerebral infarction [facility]), Cerebral vascular disease, Hyperlipidemia, Hypertension, Peripheral vascular disease ([facility] [address] and Urinary incontinence. has a past surgical history that includes  section;  Hysterectomy; and Carotid endarterectomy (Right, cueing for encouragement to complete tasks on own        Balance  Sitting Balance: Contact guard assistance  Standing Balance: Dependent/Total  Standing Balance  Time: ~2 minutes  Activity: Transfers, ADL completion   Comment: Pt completed standing at ballet car ~3 min in total with B knees blocked mod-max a of 2-pt presents with posterior lean/tactile cues to promote thoracic extension in stance  Functional Mobility  Functional - Mobility Device: Wheelchair  Activity: Other  Assist Level: Stand by assistance  Functional Mobility Comments: ~155' self propelled in w/c, occassional assistance for w/c navigation in hallway  Shower Transfers  Shower - Transfer From: Wheelchair  Shower - Transfer Type: To and From  Shower - Transfer To: Other  Shower - Technique: To right  Shower Transfers: Dependent  Shower Transfers Comments: Max A x2>BSC placed in shower   Wheelchair Bed Transfers  Wheelchair/Bed - Technique: Stand pivot  Equipment Used: Wheelchair  Level of Asssistance: Dependent/Total;2 Person assistance  Wheelchair Transfers Comments: mod-max a of 2  Bed mobility  Supine to Sit: Minimal assistance  Sit to Supine: 2 Person assistance;Dependent/Total(maxA of 2)  Scooting: Minimal assistance  Transfers  Stand Pivot Transfers: Dependent/Total;2 Person assistance(max a of 2)  Sit to stand: 2 Person assistance  Stand to sit: 2 Person assistance  Transfer Comments: EOB > w/c, w/c > BSC in shower, BSC > w/c, w/c > EOB--pt varying from mod-max assistance of 2         Cognition  Overall Cognitive Status: Exceptions  Arousal/Alertness: Delayed responses to stimuli  Following Commands:  Follows one step commands with increased time  Attention Span: Difficulty attending to directions  Memory: Decreased recall of recent events;Decreased short term memory  Safety Judgement: Decreased awareness of need for assistance;Decreased awareness of need for safety  Problem Solving: Assistance required to generate solutions;Assistance

## 2020-04-17 NOTE — PROGRESS NOTES
Bisi Barrera  4/17/2020  [id number]      Chief Complaint: Acute ischemic stroke ([address]    Subjective:   No overnight events. No current complaints. Does not meet hospice criteria. Not wanting antibiotics if she were to have a pneumonia recurrence. Requesting IV be removed. ROS: No CP, SOB, dyspnea    Objective:  Patient Vitals for the past 24 hrs:   BP Temp Temp src Pulse Resp SpO2 Weight   04/17/20 0758 (!) 164/74 98.3 °F (36.8 °C) Oral 56 18 96 % --   04/17/20 0515 -- -- -- -- -- -- 139 lb 8 oz (63.3 kg)   04/17/20 0159 (!) 166/72 -- -- -- -- -- --   04/16/20 2000 (!) 152/67 98.1 °F (36.7 °C) Oral 55 16 95 % --   04/16/20 1444 (!) 134/53 -- -- 65 -- -- --     Gen: No distress, pleasant. Resting in bed  HEENT: Normocephalic, atraumatic  CV: Regular rate and rhythm. No MRG   Resp: No respiratory distress. Diminished BS at BLL  Abd: Soft, nontender nondistended  Ext: No edema  Neuro: Alert, oriented, increased interaction. Dysarthria. LUE contracted with 0/5 strength and unable to fully abduct shoulder, extend elbow, or extend fingers and wrist due to pain    Laboratory data: Available via EMR. Therapy progress:  PT  Position Activity Restriction  Other position/activity restrictions: 80 y.o. female patient presenting by EMS from home. Patient lives with her daughter and son-in-law. Patient in usual state of health on Tuesday. Yesterday morning, Wednesday she woke with altered mental status which has progressed. Phone call to PCP indicating concern for UTI. Requested UA. UA not obtained. Cipro initiated. I did speak with daughter Kandis Esquivel at [phone] who indicated she had similar presentation in October 1, 2017 at which time she was admitted with altered mental status secondary to UTI. She was admitted to ICU because of hypotension. At this time the patient is combative and confused. According to daughter Kandis Esquivel does indicate that this is a clear deviation from her baseline.   Objective     Sit to Stand: Dependent/Total, 2 Person Assistance(maxA of 2 from EOB, maxA of 1 and modA of 1 with stedy)  Stand to sit: 2 Person Assistance, Dependent/Total(maxA of 2)  Bed to Chair: 2 Person Assistance, Dependent/Total(maxA of 2)     OT  PT Equipment Recommendations  Equipment Needed: Yes  Mobility Devices: Transport Devices, Hospital Bed  Transport Devices: Patient [address] Bed : Bed Rails - Partial  Other: marty lift  Toilet - Technique: Stand pivot  Equipment Used: Standard bedside commode  Toilet Transfers Comments: Max A x2  Assessment        SLP  Current Diet : Dysphagia Pureed (Dysphagia I)  Current Liquid Diet : Moderately Thick (Honey)  Diet Solids Recommendation: Dysphagia Pureed (Dysphagia I)  Liquid Consistency Recommendation: Thin    Body mass index is 26.36 kg/m². Assessment:  Patient Active Problem List   Diagnosis    Hemiplegia, late effect of cerebrovascular disease ([address]    Allergic rhinitis    Irritable bowel syndrome    Peripheral vascular disease ([address]    Cerebrovascular accident (CVA) due to occlusion of cerebral artery ([address]    Occlusion and stenosis of carotid artery with cerebral infarction    Irritable bladder    Overactive bladder    Essential hypertension    Mixed hyperlipidemia    Compression fracture of L1 lumbar vertebra (HCC)    Gastroesophageal reflux disease without esophagitis    Hyponatremia    Renal insufficiency    AMS (altered mental status)    Acute metabolic encephalopathy    UTI (urinary tract infection)    High anion gap metabolic acidosis    Leukocytosis    Atrial fibrillation with rapid ventricular response (HCC)    Dementia (HCC)    Dysphagia    Aspiration pneumonia (HCC)    Acute ischemic stroke ([address]    Moderate malnutrition ([address]       Plan:   Acute L PCA infarct: Mild petechial hemorrhage. ASA, statin. Xarelto initiated on 4/14 per neuro.  PT/OT/SLP     Afib w/ RVR: Xarelto as above, cardizem 30     Aspiration PNA: levaquin

## 2020-04-17 NOTE — PLAN OF CARE
Gave preventative tylenol. Participated w/ therapy. Encouraged increasing fluids, only urinated x2, incont bm w/ therapy  Problem: IP BLADDER/VOIDING  Goal: STG - Patient demonstrates no accidents  Outcome: Ongoing     Problem: IP BOWEL ELIMINATION  Goal: STG - Patient participates in bowel management program  Outcome: Ongoing     Problem: NUTRITION  Description: Altered Nutrition and Hydration  Goal: Patient maintains adequate hydration  Outcome: Ongoing     Problem: SAFETY  Goal: LTG - Patient will demonstrate safety requirements appropriate to situation/environment  Outcome: Ongoing     Problem: SKIN INTEGRITY  Goal: STG - patient will maintain good skin integrity  Outcome: Ongoing  Goal: STG - patient demonstrates pressure reduction techniques  Outcome: Ongoing     Problem: PAIN  Goal: LTG - Patient will demonstrate intervention for managing pain  Outcome: Ongoing  Goal: STG - pain is manageable through therapies  Outcome: Ongoing     Problem: Falls - Risk of:  Goal: Will remain free from falls  Description: Will remain free from falls  Outcome: Ongoing  Goal: Absence of physical injury  Description: Absence of physical injury  Outcome: Ongoing     Problem: Neurological  Goal: Maximum potential motor/sensory/cognitive function  Outcome: Ongoing     Problem: Nutrition  Goal: Optimal nutrition therapy  Outcome: Ongoing     Problem: Pain:  Description: Pain management should include both nonpharmacologic and pharmacologic interventions.   Goal: Pain level will decrease  Description: Pain level will decrease  Outcome: Ongoing  Goal: Control of acute pain  Description: Control of acute pain  Outcome: Ongoing  Goal: Control of chronic pain  Description: Control of chronic pain  Outcome: Ongoing

## 2020-04-18 PROCEDURE — 1280000000 HC REHAB R&B

## 2020-04-18 PROCEDURE — 94760 N-INVAS EAR/PLS OXIMETRY 1: CPT

## 2020-04-18 PROCEDURE — 6370000000 HC RX 637 (ALT 250 FOR IP): Performed by: PHYSICAL MEDICINE & REHABILITATION

## 2020-04-18 RX ADMIN — DILTIAZEM HYDROCHLORIDE 30 MG: 60 TABLET, FILM COATED ORAL at 14:30

## 2020-04-18 RX ADMIN — DESMOPRESSIN ACETATE 40 MG: 0.2 TABLET ORAL at 21:48

## 2020-04-18 RX ADMIN — LOSARTAN POTASSIUM 100 MG: 100 TABLET, FILM COATED ORAL at 10:31

## 2020-04-18 RX ADMIN — DILTIAZEM HYDROCHLORIDE 30 MG: 60 TABLET, FILM COATED ORAL at 10:31

## 2020-04-18 RX ADMIN — SERTRALINE 50 MG: 50 TABLET, FILM COATED ORAL at 10:31

## 2020-04-18 RX ADMIN — DILTIAZEM HYDROCHLORIDE 30 MG: 60 TABLET, FILM COATED ORAL at 21:47

## 2020-04-18 RX ADMIN — LANSOPRAZOLE 30 MG: 30 TABLET, ORALLY DISINTEGRATING ORAL at 06:43

## 2020-04-18 RX ADMIN — ASPIRIN 81 MG 81 MG: 81 TABLET ORAL at 10:34

## 2020-04-18 RX ADMIN — RIVAROXABAN 15 MG: 15 TABLET, FILM COATED ORAL at 17:42

## 2020-04-18 RX ADMIN — DILTIAZEM HYDROCHLORIDE 30 MG: 60 TABLET, FILM COATED ORAL at 02:27

## 2020-04-18 ASSESSMENT — PAIN SCALES - GENERAL
PAINLEVEL_OUTOF10: 0
PAINLEVEL_OUTOF10: 0

## 2020-04-18 NOTE — PLAN OF CARE
Problem: IP BLADDER/VOIDING  Goal: STG - Patient demonstrates no accidents  Outcome: Ongoing  Note: Pt requested bed pan today to urinate and was continent. Problem: SKIN INTEGRITY  Goal: STG - patient will maintain good skin integrity  Outcome: Ongoing  Note: Pt has stage 2 pressure injury to coccyx. Protective barrier cream applied. Problem: Falls - Risk of:  Goal: Will remain free from falls  Description: Will remain free from falls  Outcome: Ongoing  Note: Fall risk precautions in place, call light in reach, bed in lowest position, 2/2 bed rails up, bed wheels locked, bed side table within reach, bed alarm on, will continue to monitor. Problem: Falls - Risk of:  Goal: Absence of physical injury  Description: Absence of physical injury  Outcome: Ongoing  Note: Pt is free of injury. No injury noted. Fall precautions in place. Call light within reach. Will monitor. Problem: Pain:  Goal: Pain level will decrease  Description: Pain level will decrease  Outcome: Ongoing  Note: No complaints of pain this shift. Will continue to assess and monitor.

## 2020-04-18 NOTE — PLAN OF CARE
Problem: IP BLADDER/VOIDING  Goal: STG - Patient demonstrates no accidents  4/17/2020 2122 by Malcolm Coleman RN  Outcome: Ongoing     Problem: NUTRITION  Goal: Patient maintains adequate hydration  4/17/2020 2122 by Malcolm Coleman RN  Outcome: Ongoing     Problem: SAFETY  Goal: LTG - Patient will demonstrate safety requirements appropriate to situation/environment  4/17/2020 2122 by Malcolm Coleman RN  Outcome: Ongoing     Problem: SKIN INTEGRITY  Goal: STG - patient will maintain good skin integrity  4/17/2020 2122 by Malcolm Coleman RN  Outcome: Ongoing     Problem: Nutrition  Goal: Optimal nutrition therapy  4/17/2020 2122 by Malcolm Coleman RN  Outcome: Ongoing     Problem: Nutrition  Goal: Optimal nutrition therapy  4/17/2020 2122 by Malcolm Coleman RN  Outcome: Ongoing     Problem: Pain:  Goal: Control of chronic pain  Description: Control of chronic pain  4/17/2020 2122 by Malcolm Coleman RN  Outcome: Ongoing

## 2020-04-19 PROCEDURE — 1280000000 HC REHAB R&B

## 2020-04-19 PROCEDURE — 6370000000 HC RX 637 (ALT 250 FOR IP): Performed by: PHYSICAL MEDICINE & REHABILITATION

## 2020-04-19 RX ADMIN — DILTIAZEM HYDROCHLORIDE 30 MG: 60 TABLET, FILM COATED ORAL at 12:09

## 2020-04-19 RX ADMIN — DILTIAZEM HYDROCHLORIDE 30 MG: 60 TABLET, FILM COATED ORAL at 18:13

## 2020-04-19 RX ADMIN — LOSARTAN POTASSIUM 100 MG: 100 TABLET, FILM COATED ORAL at 12:10

## 2020-04-19 RX ADMIN — DESMOPRESSIN ACETATE 40 MG: 0.2 TABLET ORAL at 20:44

## 2020-04-19 RX ADMIN — ASPIRIN 81 MG 81 MG: 81 TABLET ORAL at 12:09

## 2020-04-19 RX ADMIN — SERTRALINE 50 MG: 50 TABLET, FILM COATED ORAL at 12:09

## 2020-04-19 RX ADMIN — LANSOPRAZOLE 30 MG: 30 TABLET, ORALLY DISINTEGRATING ORAL at 05:49

## 2020-04-19 RX ADMIN — RIVAROXABAN 15 MG: 15 TABLET, FILM COATED ORAL at 18:13

## 2020-04-19 RX ADMIN — ACETAMINOPHEN 650 MG: 325 TABLET, FILM COATED ORAL at 20:44

## 2020-04-19 RX ADMIN — DILTIAZEM HYDROCHLORIDE 30 MG: 60 TABLET, FILM COATED ORAL at 02:20

## 2020-04-19 ASSESSMENT — PAIN SCALES - GENERAL
PAINLEVEL_OUTOF10: 0
PAINLEVEL_OUTOF10: 0

## 2020-04-19 NOTE — PROGRESS NOTES
Pt awakened this morning by RN. Pt stated \"I am tired! \". RN explained to patient that it was 1130 in the morning and it was time for breakfast. Food heated up. Pt eating now.  Electronically signed by Amita Guerrero RN on 4/19/2020 at 11:54 AM

## 2020-04-19 NOTE — PLAN OF CARE
Problem: IP BLADDER/VOIDING  Goal: STG - Patient demonstrates no accidents  Outcome: Ongoing  Note: Pt can be incontinent. No accidents this shift. Pt called for bedpan. Problem: SKIN INTEGRITY  Goal: STG - patient will maintain good skin integrity  Outcome: Ongoing  Note: Pt has excoriation to coccyx area. Moisture protective barrier being applied. Problem: Falls - Risk of:  Goal: Will remain free from falls  Description: Will remain free from falls  Outcome: Ongoing  Note: Fall risk precautions in place, call light in reach, bed in lowest position, 2/2 bed rails up, bed wheels locked, bed side table within reach, bed alarm on, will continue to monitor. Problem: Falls - Risk of:  Goal: Absence of physical injury  Description: Absence of physical injury  Outcome: Ongoing  Note: Pt is free of injury. No injury noted. Fall precautions in place. Call light within reach. Will monitor. Problem: Pain:  Goal: Pain level will decrease  Description: Pain level will decrease  Outcome: Ongoing  Note: No complaints of pain this shift. Will continue to assess and monitor.

## 2020-04-20 LAB
ANION GAP SERPL CALCULATED.3IONS-SCNC: 10 MMOL/L (ref 3–16)
BUN BLDV-MCNC: 13 MG/DL (ref 7–20)
CALCIUM SERPL-MCNC: 9.3 MG/DL (ref 8.3–10.6)
CHLORIDE BLD-SCNC: 93 MMOL/L (ref 99–110)
CO2: 26 MMOL/L (ref 21–32)
CREAT SERPL-MCNC: 0.7 MG/DL (ref 0.6–1.2)
GFR AFRICAN AMERICAN: >60
GFR NON-AFRICAN AMERICAN: >60
GLUCOSE BLD-MCNC: 92 MG/DL (ref 70–99)
HCT VFR BLD CALC: 37.3 % (ref 36–48)
HEMOGLOBIN: 12.7 G/DL (ref 12–16)
MAGNESIUM: 2 MG/DL (ref 1.8–2.4)
MCH RBC QN AUTO: 32.3 PG (ref 26–34)
MCHC RBC AUTO-ENTMCNC: 34 G/DL (ref 31–36)
MCV RBC AUTO: 95 FL (ref 80–100)
PDW BLD-RTO: 14.4 % (ref 12.4–15.4)
PLATELET # BLD: 347 K/UL (ref 135–450)
PMV BLD AUTO: 8.9 FL (ref 5–10.5)
POTASSIUM SERPL-SCNC: 4.7 MMOL/L (ref 3.5–5.1)
RBC # BLD: 3.92 M/UL (ref 4–5.2)
SODIUM BLD-SCNC: 129 MMOL/L (ref 136–145)
WBC # BLD: 7 K/UL (ref 4–11)

## 2020-04-20 PROCEDURE — 36415 COLL VENOUS BLD VENIPUNCTURE: CPT

## 2020-04-20 PROCEDURE — 97535 SELF CARE MNGMENT TRAINING: CPT

## 2020-04-20 PROCEDURE — 85027 COMPLETE CBC AUTOMATED: CPT

## 2020-04-20 PROCEDURE — 80048 BASIC METABOLIC PNL TOTAL CA: CPT

## 2020-04-20 PROCEDURE — 6370000000 HC RX 637 (ALT 250 FOR IP): Performed by: PHYSICAL MEDICINE & REHABILITATION

## 2020-04-20 PROCEDURE — 97530 THERAPEUTIC ACTIVITIES: CPT

## 2020-04-20 PROCEDURE — U0002 COVID-19 LAB TEST NON-CDC: HCPCS

## 2020-04-20 PROCEDURE — 1280000000 HC REHAB R&B

## 2020-04-20 PROCEDURE — 83735 ASSAY OF MAGNESIUM: CPT

## 2020-04-20 RX ADMIN — LANSOPRAZOLE 30 MG: 30 TABLET, ORALLY DISINTEGRATING ORAL at 06:02

## 2020-04-20 RX ADMIN — LOSARTAN POTASSIUM 100 MG: 100 TABLET, FILM COATED ORAL at 14:41

## 2020-04-20 RX ADMIN — RIVAROXABAN 15 MG: 15 TABLET, FILM COATED ORAL at 17:26

## 2020-04-20 RX ADMIN — ACETAMINOPHEN 650 MG: 325 TABLET, FILM COATED ORAL at 06:02

## 2020-04-20 RX ADMIN — DILTIAZEM HYDROCHLORIDE 30 MG: 60 TABLET, FILM COATED ORAL at 00:34

## 2020-04-20 RX ADMIN — DILTIAZEM HYDROCHLORIDE 30 MG: 60 TABLET, FILM COATED ORAL at 14:41

## 2020-04-20 RX ADMIN — ASPIRIN 81 MG 81 MG: 81 TABLET ORAL at 09:37

## 2020-04-20 RX ADMIN — DILTIAZEM HYDROCHLORIDE 30 MG: 60 TABLET, FILM COATED ORAL at 09:37

## 2020-04-20 RX ADMIN — DESMOPRESSIN ACETATE 40 MG: 0.2 TABLET ORAL at 20:49

## 2020-04-20 RX ADMIN — SERTRALINE 50 MG: 50 TABLET, FILM COATED ORAL at 09:37

## 2020-04-20 RX ADMIN — DILTIAZEM HYDROCHLORIDE 30 MG: 60 TABLET, FILM COATED ORAL at 20:50

## 2020-04-20 ASSESSMENT — PAIN SCALES - GENERAL
PAINLEVEL_OUTOF10: 0

## 2020-04-20 NOTE — PROGRESS NOTES
Physical Therapy  Facility/Department: [facility] [place] ACUTE REHAB UNIT  Daily Treatment Note  NAME: Fei Bowman  : 11/3/1933  MRN: [id number]    Date of Service: 2020    Discharge Recommendations:  24 hour supervision or assist, Home with Home health PT, S Level 4   PT Equipment Recommendations  Equipment Needed: Yes  Mobility Devices: Transport Devices  Transport Devices: Patient [address] Bed : Bed Rails - Partial  Other: marty lift    Assessment   Body structures, Functions, Activity limitations: Decreased functional mobility ; Decreased endurance;Decreased cognition;Decreased strength;Decreased ROM; Decreased balance;Decreased posture  Assessment: Pt continues to demonstrate decreased tolerance to out of bed activity and requires assist of 2 caregivers for all functional mobility. Treatment Diagnosis: impaired mobility and balance  Prognosis: Fair;Guarded  PT Education: Transfer Training;Functional Mobility Training;Pressure Relief;Goals;PT Role;Plan of Care;Weight-bearing Education;Equipment  Patient Education: Pt requires reinforcement of education. Importance of out of bed activity to increase strength. Barriers to Learning: cognition  REQUIRES PT FOLLOW UP: Yes  Activity Tolerance  Activity Tolerance: Patient limited by fatigue;Patient limited by endurance     Patient Diagnosis(es): CVA. has a past medical history of Cerebral artery occlusion with cerebral infarction [facility]), Cerebral vascular disease, Hyperlipidemia, Hypertension, Peripheral vascular disease ([facility] [address] and Urinary incontinence. has a past surgical history that includes  section; Hysterectomy; and Carotid endarterectomy (Right, 2005). Restrictions  Restrictions/Precautions  Restrictions/Precautions: Fall Risk  Required Braces or Orthoses?: No  Position Activity Restriction  Other position/activity restrictions: 80 y.o. female patient presenting by EMS from home.   Patient lives with her daughter and son-in-law. Patient in usual state of health on Tuesday. Yesterday morning, Wednesday she woke with altered mental status which has progressed. Phone call to PCP indicating concern for UTI. Requested UA. UA not obtained. Cipro initiated. I did speak with daughter Melissa Cervantes at [phone] who indicated she had similar presentation in October 1, 2017 at which time she was admitted with altered mental status secondary to UTI. She was admitted to ICU because of hypotension. At this time the patient is combative and confused. According to daughter Melissa Cervantes does indicate that this is a clear deviation from her baseline. Subjective   General  Chart Reviewed: Yes  Additional Pertinent Hx: Pt is an 80 y.o. female who presented to the ED on 3/26/20 with AMS s/p fall out of w/c and hitting head. 3-31 MRI (+) Acute infarct within the left occipital lobe with possible petechial hemorrhage. Response To Previous Treatment: Patient with no complaints from previous session. Family / Caregiver Present: No  Subjective  Subjective: Pt elevated in supine in bed on arrival and finishing breakfast.  Pt agreeable to sponge bath. Requesting to go to bathroom.   Pain Screening  Patient Currently in Pain: No  Vital Signs  Patient Currently in Pain: No       Orientation     Cognition      Objective   Bed mobility  Rolling to Left: Moderate assistance  Rolling to Right: Moderate assistance  Supine to Sit: Maximum assistance  Sit to Supine: Dependent/Total  Transfers  Sit to Stand: Dependent/Total(maxA of 1 and modA of 1)  Stand to sit: 2 Person Assistance;Dependent/Total(maxA of 1 and modA of 1)  Bed to Chair: 2 Person Assistance;Dependent/Total(maxA of 2)  Stand Pivot Transfers: Dependent/Total;2 Person Assistance(maxA of 2)  Wheelchair Activities  Propulsion: Yes  Propulsion 1  Propulsion: Manual  Level: Level Tile  Method: RUE;RLE  Level of Assistance: Stand by assistance  Description/ Details: Patient used RUE only to self-propel w/ self correction of deviation of midline  Distance: 155'     Balance  Posture: Poor  Sitting - Static: Good  Sitting - Dynamic: Good;-  Standing - Static: Poor  Standing - Dynamic: Poor            Comment: 1st session:  Performed bed mobility and transfer to toilet with stedy. Performed dressing on toilet with OT. Performed SPT from toilet to w/c as documetned above. Performed w/c mobility as documented above. Performed standing X 30 seconds at ballet bar with modA X 2 for STS and B knee block with modA and vc and tc for upright posture. Pt refused futher standing attempts and requested to return to bed. Performed sit to supine as documented above. Pt remained in bed with alarm on and all needs in reach.                G-Code     OutComes Score                                                     AM-PAC Score             Goals  Short term goals  Time Frame for Short term goals: 1-2 weeks  Short term goal 1: Perform all bed mobility with Adriana - not met  Short term goal 2: Perform all transfers mod A - not met  Short term goal 3: Propel w/c 25' with Adriana - met 4/14/2020  Long term goals  Time Frame for Long term goals : 2-3 weeks  Long term goal 1: Perform all bed mobility SBA - not met  Long term goal 2: Perform all transfers SBA - not met  Long term goal 3: Propel w/c 48' with supervision - not met  Patient Goals   Patient goals : \"to transfer on my own again\"    Plan    Plan  Times per week: 60 minutes a day 5 days a week  Current Treatment Recommendations: Functional Mobility Training, Neuromuscular Re-education, Balance Training, Strengthening, Transfer Training, Wheelchair Mobility Training, Manual Therapy - Soft Tissue Mobilization, Endurance Training, Safety Education & Training, Positioning, Equipment Evaluation, Education, & procurement, ROM, Patient/Caregiver Education & Training  Safety Devices  Type of devices: Call light within reach, Nurse notified, Gait belt, All fall risk precautions in place, Bed

## 2020-04-20 NOTE — CARE COORDINATION
Called pt's dtr to discuss SNF choices, she will discuss with pt's spouse today and review [facility] list. Await call back.  Love Carranza, MSW, LSW

## 2020-04-20 NOTE — PLAN OF CARE
Pt reminded to sit up completely while eating or drinking, no straws, and to take small sips of thin liquids to prevent aspiration, will continue to monitor patient and reinforce pt teaching

## 2020-04-20 NOTE — PATIENT CARE CONFERENCE
touching assistance  Car Transfer  Reason if not Attempted: Not attempted due to medical condition or safety concerns  CARE Score: 80                          Wheelchair Ability  Uses a Wheelchair and/or Scooter?: Yes    SPEECH THERAPY:    Diet Level:Dietary Nutrition Supplements: Frozen Oral Supplement  DIET GENERAL;    Assessment: Speech Therapy Diagnosis  Cognitive Diagnosis: Pt presents with moderate to severe cognitive linguistic deficits in the domains of judgement, insight into deficits, working and short term memory, and attention. Pt with history of dementia and has 24/7 supervision-assistance from family. Speech Diagnosis: Pt presents with mild dysarthria due to left perioral weakness. Conversational intelligibility was mildly reduced and also impacted by wet vocal quality after intake of solids. Dysphagia Impression : Pt presents with moderate oropharyngeal dysphagia. Pt demonstrated moderate anterior bolus loss on the right post swallow, difficulty managing secreations, reduced bolus awareness, signs of premature bolus loss of thin liquids. Pharyngeal phase deficits were characterized by reduced hyolaryngeal elevation and delayed swallow initiationacross all consistencies, and wet vocal quality with thicker consistencies. No overt signs of aspiration were assesed but high concern for pharyngeal pooling of thick consistencies and poor ability to retain in the oral cavity. In addition, pt with poor awareness of dysphagia and concerns for aspiration, intake was slow and limited, pt with poor acceptance of altered consistencies and will likely need close monitoring of intake and intermittent assistance with feeding.       OCCUPATIONAL THERAPY:    ADL:   ADL  Feeding: Setup, Stand by assistance(complete set up/ stand by to stabilize bowls during scooping)  Grooming: Setup(to comb hair, brush teeth completed seated in w/c at sink)  UE Bathing: Maximum assistance, Verbal cueing, Increased time to complete(assist for thoroughness)  LE Bathing: Maximum assistance, Verbal cueing  UE Dressing: Maximum assistance(don shirt over trunk)  LE Dressing: Dependent/Total  Toileting: Dependent/Total  Additional Comments: verbal and tactile cueing provided throughout for UB/LB bathing and dressing tasks. Pt requiring cueing for encouragement to complete tasks on own    Toilet Transfers: Toilet Transfers  Toilet - Technique: Stand pivot  Equipment Used: Standard bedside commode  Toilet Transfer: 2 Person assistance, Dependent/Total  Toilet Transfers Comments: Max A x2    Tub/ShowerTransfers:     Shower Transfers  Shower - Transfer From: Wheelchair  Shower - Transfer Type: To and From  Shower - Transfer To: Other  Shower - Technique:  To right  Shower Transfers: Dependent  Shower Transfers Comments: Max A x2>BSC placed in shower     QM:  Eating  Assistance Needed: Partial/moderate assistance(pills crushed in pudding, needed extra time & cues to clear)  CARE Score: 3  Discharge Goal: Set-up or clean-up assistance  Oral Hygiene  Assistance Needed: Partial/moderate assistance(used swab herself but was not thorough)  CARE Score: 3  Discharge Goal: Set-up or clean-up assistance  Toileting Hygiene  Assistance Needed: Dependent  Comment: used bedpan, staff managed clothing & hygeine   CARE Score: 1  Discharge Goal: Independent  Toilet Transfer  Assistance Needed: Dependent  Comment: incont w/ therapy  CARE Score: 1  Discharge Goal: Partial/moderate assistance  Shower/Bathe Self  Assistance Needed: Substantial/maximal assistance  CARE Score: 2  Discharge Goal: Partial/moderate assistance  Upper Body Dressing  Assistance Needed: Substantial/maximal assistance  CARE Score: 2  Discharge Goal: Partial/moderate assistance  Lower Body Dressing  Assistance Needed: Dependent  CARE Score: 1  Discharge Goal: Partial/moderate assistance  Putting On/Taking Off Footwear  Assistance Needed: Dependent  CARE Score: 1  Discharge Goal:

## 2020-04-20 NOTE — PROGRESS NOTES
Dependent/Total  Standing Balance  Time: ~2 minutes  Activity: Transfers, ADL completion   Comment: Pt completed static stance at ballet barre w/ assist x2 to block knees. Pt refusing additional stances, requesting to be put back to bed. Functional Mobility  Functional - Mobility Device: Wheelchair  Activity: Other  Assist Level: Stand by assistance  Functional Mobility Comments: ~155' self propelled in w/c, occassional assistance for w/c navigation in hallway  Toilet Transfers  Toilet - Technique: Stand pivot  Equipment Used: Standard bedside commode  Toilet Transfer: 2 Person assistance;Dependent/Total  Toilet Transfers Comments: Max A x2  Bed mobility  Rolling to Left: Moderate assistance  Rolling to Right: Moderate assistance  Supine to Sit: Maximum assistance  Sit to Supine: Dependent/Total  Scooting: Minimal assistance  Transfers  Stand Pivot Transfers: Dependent/Total;2 Person assistance  Sit to stand: 2 Person assistance  Stand to sit: 2 Person assistance      Cognition  Overall Cognitive Status: Exceptions  Arousal/Alertness: Delayed responses to stimuli  Following Commands:  Follows one step commands with increased time  Attention Span: Difficulty attending to directions  Memory: Decreased recall of recent events;Decreased short term memory  Safety Judgement: Decreased awareness of need for assistance;Decreased awareness of need for safety  Problem Solving: Assistance required to generate solutions;Assistance required to implement solutions  Insights: Decreased awareness of deficits  Initiation: Requires cues for some  Sequencing: Requires cues for some         Plan   Plan  Times per week: Discharge to inpatient hospice vs SNF  Times per day: Daily  Current Treatment Recommendations: Functional Mobility Training, Strengthening, Endurance Training, Balance Training, Safety Education & Training, Equipment Evaluation, Education, & procurement, Patient/Caregiver Education & Training, Self-Care / ADL,

## 2020-04-20 NOTE — PROGRESS NOTES
Joanna Marino  4/20/2020  [id number]      Chief Complaint: Acute ischemic stroke [facility])    Subjective:   No significant weekend events. No current complaints. Labs reviewed. Sodium 129. ROS: No CP, SOB, dyspnea    Objective:  Patient Vitals for the past 24 hrs:   BP Temp Temp src Pulse Resp SpO2 Weight   04/20/20 0642 -- -- -- -- -- -- 140 lb 6.4 oz (63.7 kg)   04/20/20 0030 (!) 151/75 -- -- 65 -- -- --   04/19/20 1918 132/73 97.9 °F (36.6 °C) Oral 61 18 94 % --   04/19/20 1209 137/76 98.6 °F (37 °C) Oral 72 16 -- --     Gen: No distress, pleasant. Resting in bed  HEENT: Normocephalic, atraumatic  CV: Regular rate and rhythm. No MRG   Resp: No respiratory distress. Mild inspiratory wheeze, Diminished BS at BLL  Abd: Soft, nontender nondistended  Ext: No edema  Neuro: Alert, oriented, increased interaction. Dysarthria. LUE contracted with 0/5 strength and unable to fully abduct shoulder, extend elbow, or extend fingers and wrist due to pain    Laboratory data: Available via EMR. Therapy progress:  PT  Position Activity Restriction  Other position/activity restrictions: 80 y.o. female patient presenting by EMS from home. Patient lives with her daughter and son-in-law. Patient in usual state of health on Tuesday. Yesterday morning, Wednesday she woke with altered mental status which has progressed. Phone call to PCP indicating concern for UTI. Requested UA. UA not obtained. Cipro initiated. I did speak with daughter Emiliana Ling at [phone] who indicated she had similar presentation in October 1, 2017 at which time she was admitted with altered mental status secondary to UTI. She was admitted to ICU because of hypotension. At this time the patient is combative and confused. According to daughter Emiliana Ling does indicate that this is a clear deviation from her baseline.   Objective     Sit to Stand: Dependent/Total, 2 Person Assistance(maxA of 1 and modA of 1 from [facility] and EOB)  Stand to sit: 2 Person Assistance,

## 2020-04-21 LAB
SARS-COV-2: NOT DETECTED
SOURCE: NORMAL

## 2020-04-21 PROCEDURE — 1280000000 HC REHAB R&B

## 2020-04-21 PROCEDURE — 97530 THERAPEUTIC ACTIVITIES: CPT

## 2020-04-21 PROCEDURE — 6370000000 HC RX 637 (ALT 250 FOR IP): Performed by: PHYSICAL MEDICINE & REHABILITATION

## 2020-04-21 PROCEDURE — 97535 SELF CARE MNGMENT TRAINING: CPT

## 2020-04-21 RX ADMIN — SERTRALINE 50 MG: 50 TABLET, FILM COATED ORAL at 08:21

## 2020-04-21 RX ADMIN — DILTIAZEM HYDROCHLORIDE 30 MG: 60 TABLET, FILM COATED ORAL at 14:28

## 2020-04-21 RX ADMIN — DILTIAZEM HYDROCHLORIDE 30 MG: 60 TABLET, FILM COATED ORAL at 08:21

## 2020-04-21 RX ADMIN — DILTIAZEM HYDROCHLORIDE 30 MG: 60 TABLET, FILM COATED ORAL at 21:00

## 2020-04-21 RX ADMIN — ASPIRIN 81 MG 81 MG: 81 TABLET ORAL at 08:21

## 2020-04-21 RX ADMIN — LOSARTAN POTASSIUM 100 MG: 100 TABLET, FILM COATED ORAL at 08:21

## 2020-04-21 RX ADMIN — RIVAROXABAN 15 MG: 15 TABLET, FILM COATED ORAL at 18:04

## 2020-04-21 RX ADMIN — DILTIAZEM HYDROCHLORIDE 30 MG: 60 TABLET, FILM COATED ORAL at 02:43

## 2020-04-21 RX ADMIN — LANSOPRAZOLE 30 MG: 30 TABLET, ORALLY DISINTEGRATING ORAL at 05:50

## 2020-04-21 RX ADMIN — DESMOPRESSIN ACETATE 40 MG: 0.2 TABLET ORAL at 20:59

## 2020-04-21 ASSESSMENT — PAIN SCALES - WONG BAKER
WONGBAKER_NUMERICALRESPONSE: 0

## 2020-04-21 ASSESSMENT — PAIN SCALES - GENERAL
PAINLEVEL_OUTOF10: 0

## 2020-04-21 NOTE — PROGRESS NOTES
Kenedy [place]  4/21/2020  [id number]      Chief Complaint: Acute ischemic stroke ([facility] [address]    Subjective:   No overnight events. No current complaints. ROS: No CP, SOB, dyspnea    Objective:  Patient Vitals for the past 24 hrs:   BP Temp Temp src Pulse Resp SpO2 Weight   04/21/20 0800 (!) 164/94 98.3 °F (36.8 °C) Oral 75 -- -- --   04/21/20 0545 -- -- -- -- -- -- 137 lb 8 oz (62.4 kg)   04/21/20 0240 (!) 160/77 -- -- 68 -- -- --   04/20/20 1930 (!) 143/72 98.1 °F (36.7 °C) Oral 60 16 95 % --     Gen: No distress, pleasant. Resting in bed  HEENT: Normocephalic, atraumatic  CV: Regular rate and rhythm. No MRG   Resp: No respiratory distress. Mild inspiratory wheeze, Diminished BS at BLL  Abd: Soft, nontender nondistended  Ext: No edema  Neuro: Alert, oriented, increased interaction. Dysarthria. LUE contracted with 0/5 strength and unable to fully abduct shoulder, extend elbow, or extend fingers and wrist due to pain    Laboratory data: Available via EMR. Therapy progress:  PT  Position Activity Restriction  Other position/activity restrictions: 80 y.o. female patient presenting by EMS from home. Patient lives with her daughter and son-in-law. Patient in usual state of health on Tuesday. Yesterday morning, Wednesday she woke with altered mental status which has progressed. Phone call to PCP indicating concern for UTI. Requested UA. UA not obtained. Cipro initiated. I did speak with daughter Mccarty Spirelayne at [phone] who indicated she had similar presentation in October 1, 2017 at which time she was admitted with altered mental status secondary to UTI. She was admitted to ICU because of hypotension. At this time the patient is combative and confused. According to daughter Lul Moore does indicate that this is a clear deviation from her baseline.   Objective     Sit to Stand: Dependent/Total(maxA of 1 and modA of 1)  Stand to sit: 2 Person Assistance, Dependent/Total(maxA of 1 and modA of 1)  Bed to Chair: 2 Person

## 2020-04-21 NOTE — PROGRESS NOTES
Call light within reach, Nurse notified, Gait belt, All fall risk precautions in place, Bed alarm in place, Left in bed  Restraints  Initially in place: No     Therapy Time   Individual Concurrent Group Co-treatment   Time In       0830   Time Out       0924   Minutes       54          Timed Code Treatment Minutes:  54    Total Treatment Minutes: 54  6 minute variance due to pt refusal    Jennifer Miller, DPT [id number]

## 2020-04-21 NOTE — CARE COORDINATION
Received a voicemail from pt's dtr, Kamilah and after reviewing Medicare. gov her first choice is now [facility]- faxed referral. Marlyn Sacks, MSW, LSW

## 2020-04-21 NOTE — PROGRESS NOTES
01/07/2005). Restrictions  Restrictions/Precautions  Restrictions/Precautions: Fall Risk  Required Braces or Orthoses?: No  Position Activity Restriction  Other position/activity restrictions: 80 y.o. female patient presenting by EMS from home. Patient lives with her daughter and son-in-law. Patient in usual state of health on Tuesday. Yesterday morning, Wednesday she woke with altered mental status which has progressed. Phone call to PCP indicating concern for UTI. Requested UA. UA not obtained. Cipro initiated. I did speak with daughter Lul Moore at [phone] who indicated she had similar presentation in October 1, 2017 at which time she was admitted with altered mental status secondary to UTI. She was admitted to ICU because of hypotension. At this time the patient is combative and confused. According to daughter Lul Moore does indicate that this is a clear deviation from her baseline. Subjective   General  Chart Reviewed: Yes  Patient assessed for rehabilitation services?: Yes(RN)  Additional Pertinent Hx:    Response to previous treatment: Patient with no complaints from previous session  Family / Caregiver Present: No  Referring Practitioner: Litzy Brown MD  Diagnosis: CVA  Subjective  Subjective: Pt supine in bed upon entry, agreeable to OT/PT cotx  Vital Signs  Patient Currently in Pain: Denies     Objective    ADL  Grooming: Setup;Supervision(oral care/comb hair )  UE Bathing: Maximum assistance;Verbal cueing; Increased time to complete  LE Bathing: Maximum assistance;Verbal cueing; Increased time to complete  UE Dressing: Maximum assistance  LE Dressing: Dependent/Total  Toileting: Dependent/Total  Additional Comments: Use of stedy to transport pt to bathroom for energy conservation purposes. Pt transferred onto [facility] placed in shower. Pt incontinent of urine throughout session, dependent for all toileting. Pt completed UB/LB bathing and dressing while seated on BSC.  Pt w/ minimal effort w/ task Daily  Current Treatment Recommendations: Functional Mobility Training, Strengthening, Endurance Training, Balance Training, Safety Education & Training, Equipment Evaluation, Education, & procurement, Patient/Caregiver Education & Training, Self-Care / ADL, Neuromuscular Re-education, Cognitive Reorientation    Goals  Short term goals  Time Frame for Short term goals: 3 weeks   Short term goal 1: Functional transfer for ADL completion w/ Min A-Goal not met (Dependent)  Short term goal 2: Bathing w/ Min A -Goal not met (Dependent)  Short term goal 3: UB dressing w/ Min A-Goal not met (Max A)  Short term goal 4: LB dressing w/ Min A-Goal not met (Dependent)  Short term goal 5: Toileting w/ Min A-Goal not met (Dependent)  Long term goals  Time Frame for Long term goals : LTG=STG  Patient Goals   Patient goals :  \"To go home\"       Therapy Time   Individual Concurrent Group Co-treatment   Time In       0830   Time Out       0924   Minutes       54        Timed Code Treatment Minutes:  54   Total Treatment Minutes:  54 minutes     Minute variance=6 min d/t pt refusal     Janine Godoy, OT   Janine Godoy OTR/L, MOT #[id number]

## 2020-04-22 LAB
ANION GAP SERPL CALCULATED.3IONS-SCNC: 7 MMOL/L (ref 3–16)
BUN BLDV-MCNC: 11 MG/DL (ref 7–20)
CALCIUM SERPL-MCNC: 8.9 MG/DL (ref 8.3–10.6)
CHLORIDE BLD-SCNC: 93 MMOL/L (ref 99–110)
CO2: 27 MMOL/L (ref 21–32)
CREAT SERPL-MCNC: 0.7 MG/DL (ref 0.6–1.2)
GFR AFRICAN AMERICAN: >60
GFR NON-AFRICAN AMERICAN: >60
GLUCOSE BLD-MCNC: 106 MG/DL (ref 70–99)
HCT VFR BLD CALC: 35.4 % (ref 36–48)
HEMOGLOBIN: 11.9 G/DL (ref 12–16)
MAGNESIUM: 1.8 MG/DL (ref 1.8–2.4)
MCH RBC QN AUTO: 31.9 PG (ref 26–34)
MCHC RBC AUTO-ENTMCNC: 33.5 G/DL (ref 31–36)
MCV RBC AUTO: 95.1 FL (ref 80–100)
PDW BLD-RTO: 14 % (ref 12.4–15.4)
PLATELET # BLD: 300 K/UL (ref 135–450)
PMV BLD AUTO: 8.6 FL (ref 5–10.5)
POTASSIUM SERPL-SCNC: 4.6 MMOL/L (ref 3.5–5.1)
RBC # BLD: 3.72 M/UL (ref 4–5.2)
SODIUM BLD-SCNC: 127 MMOL/L (ref 136–145)
WBC # BLD: 8.7 K/UL (ref 4–11)

## 2020-04-22 PROCEDURE — 36415 COLL VENOUS BLD VENIPUNCTURE: CPT

## 2020-04-22 PROCEDURE — U0002 COVID-19 LAB TEST NON-CDC: HCPCS

## 2020-04-22 PROCEDURE — 6370000000 HC RX 637 (ALT 250 FOR IP): Performed by: PHYSICAL MEDICINE & REHABILITATION

## 2020-04-22 PROCEDURE — 83735 ASSAY OF MAGNESIUM: CPT

## 2020-04-22 PROCEDURE — 97535 SELF CARE MNGMENT TRAINING: CPT

## 2020-04-22 PROCEDURE — 85027 COMPLETE CBC AUTOMATED: CPT

## 2020-04-22 PROCEDURE — 80048 BASIC METABOLIC PNL TOTAL CA: CPT

## 2020-04-22 PROCEDURE — 97112 NEUROMUSCULAR REEDUCATION: CPT

## 2020-04-22 PROCEDURE — 97530 THERAPEUTIC ACTIVITIES: CPT

## 2020-04-22 PROCEDURE — 1280000000 HC REHAB R&B

## 2020-04-22 RX ADMIN — DILTIAZEM HYDROCHLORIDE 30 MG: 60 TABLET, FILM COATED ORAL at 14:52

## 2020-04-22 RX ADMIN — DESMOPRESSIN ACETATE 40 MG: 0.2 TABLET ORAL at 22:26

## 2020-04-22 RX ADMIN — DILTIAZEM HYDROCHLORIDE 30 MG: 60 TABLET, FILM COATED ORAL at 01:55

## 2020-04-22 RX ADMIN — DILTIAZEM HYDROCHLORIDE 30 MG: 60 TABLET, FILM COATED ORAL at 22:27

## 2020-04-22 RX ADMIN — DILTIAZEM HYDROCHLORIDE 30 MG: 60 TABLET, FILM COATED ORAL at 10:09

## 2020-04-22 RX ADMIN — SERTRALINE 50 MG: 50 TABLET, FILM COATED ORAL at 10:09

## 2020-04-22 RX ADMIN — LANSOPRAZOLE 30 MG: 30 TABLET, ORALLY DISINTEGRATING ORAL at 05:00

## 2020-04-22 RX ADMIN — RIVAROXABAN 15 MG: 15 TABLET, FILM COATED ORAL at 16:16

## 2020-04-22 RX ADMIN — LOSARTAN POTASSIUM 100 MG: 100 TABLET, FILM COATED ORAL at 14:53

## 2020-04-22 ASSESSMENT — PAIN SCALES - WONG BAKER
WONGBAKER_NUMERICALRESPONSE: 0

## 2020-04-22 ASSESSMENT — PAIN SCALES - GENERAL: PAINLEVEL_OUTOF10: 0

## 2020-04-22 NOTE — PROGRESS NOTES
Assistance, Dependent/Total(maxA of 1 and Adriana of 1)  Bed to Chair: 2 Person Assistance, Dependent/Total(maxA of 2)     OT  PT Equipment Recommendations  Equipment Needed: Yes  Mobility Devices: Transport Devices  Transport Devices: Patient Mikhail 20 Bed : Bed Rails - Partial  Other: marty lift  Toilet - Technique: Stand pivot  Equipment Used: Standard bedside commode  Toilet Transfers Comments: Max A x2  Assessment        SLP  Current Diet : Dysphagia Pureed (Dysphagia I)  Current Liquid Diet : Moderately Thick (Honey)  Diet Solids Recommendation: Dysphagia Pureed (Dysphagia I)  Liquid Consistency Recommendation: Thin    Body mass index is 26.68 kg/m². Assessment:  Patient Active Problem List   Diagnosis    Hemiplegia, late effect of cerebrovascular disease ([address]    Allergic rhinitis    Irritable bowel syndrome    Peripheral vascular disease ([address]    Cerebrovascular accident (CVA) due to occlusion of cerebral artery ([address]    Occlusion and stenosis of carotid artery with cerebral infarction    Irritable bladder    Overactive bladder    Essential hypertension    Mixed hyperlipidemia    Compression fracture of L1 lumbar vertebra (HCC)    Gastroesophageal reflux disease without esophagitis    Hyponatremia    Renal insufficiency    AMS (altered mental status)    Acute metabolic encephalopathy    UTI (urinary tract infection)    High anion gap metabolic acidosis    Leukocytosis    Atrial fibrillation with rapid ventricular response (HCC)    Dementia (HCC)    Dysphagia    Aspiration pneumonia (HCC)    Acute ischemic stroke ([address]    Moderate malnutrition ([address]       Plan:   Acute L PCA infarct: Mild petechial hemorrhage. ASA, statin. Xarelto initiated on 4/14 per neuro. PT/OT/SLP     Afib w/ RVR: Xarelto as above, cardizem 30     Aspiration PNA: levaquin ineffective and transitioned to zosyn and completed. CXR improved     Dysphagia: dysphagia diet suggested.  Resumed regular diet per family request, SLP     Spasticity: failed multiple medications and interventions in the past. Trialed baclofen 5 TID with no improvement and discontinued     HTN: losartan 100     HLD: lipitor     H/O R MCA CVA: residual L hemiparesis with cognitive deficits      Hyponatremia: Fluid restriction removed per patient and family wishes. - discussed decreasing intake today. Will not replace fluid restriction per patient request.    Depression: Started zoloft. Candidal UTI: s/p diflucan x3      Bowels: Per protocol  Bladder: Per protocol   Sleep: Trazodone provided prn. Pain: tylenol, tramadol PRN   DVT PPx: Xarelto    Dispo: Pending SNF evaluations. Unable to tolerate intensive therapies in an ARU setting. Taylor Alonzo MD 4/22/2020, 10:01 AM    * This document was created using dictation software. While all precautions were taken to ensure accuracy, errors may have occurred. Please disregard any typographical errors.

## 2020-04-22 NOTE — PROGRESS NOTES
restrictions: 80 y.o. female patient presenting by EMS from home. Patient lives with her daughter and son-in-law. Patient in usual state of health on Tuesday. Yesterday morning, Wednesday she woke with altered mental status which has progressed. Phone call to PCP indicating concern for UTI. Requested UA. UA not obtained. Cipro initiated. I did speak with daughter Raquel Otoole at [phone] who indicated she had similar presentation in October 1, 2017 at which time she was admitted with altered mental status secondary to UTI. She was admitted to ICU because of hypotension. At this time the patient is combative and confused. According to daughter Raquel Otoole does indicate that this is a clear deviation from her baseline. Subjective   General  Chart Reviewed: Yes  Additional Pertinent Hx: Pt is an 80 y.o. female who presented to the ED on 3/26/20 with AMS s/p fall out of w/c and hitting head. 3-31 MRI (+) Acute infarct within the left occipital lobe with possible petechial hemorrhage. Response To Previous Treatment: Patient with no complaints from previous session. Family / Caregiver Present: No  Subjective  Subjective: Pt supine in bed with signficant R lean on arrival.  Pain Screening  Patient Currently in Pain: Denies  Vital Signs  Patient Currently in Pain: Denies       Orientation     Cognition      Objective   Bed mobility  Supine to Sit: Minimal assistance  Transfers  Sit to Stand: Maximum Assistance(maxA of 1 with grab bar, maxA of 2 from EOB)  Stand to sit: 2 Person Assistance;Dependent/Total(maxA of 1 and Adriana of 1)  Bed to Chair: 2 Person Assistance;Dependent/Total(maxA of 2)  Stand Pivot Transfers: Dependent/Total;2 Person Assistance(maxA of 2)        Balance  Posture: Poor  Sitting - Static: Fair;-  Sitting - Dynamic: Fair;-  Standing - Static: Poor  Standing - Dynamic: Poor            Comment: Performed bed mobility and transfer to w/c and toilet as documented above.   Performed SPT from w/c to EOM as documented above. Performed facilitation for midline posture with pt requiring min-maxA to maintain midline posture in sitting with feet supported. Performed lateral shift in sitting with faciilitation to increase L weight shift due to all weight on R hip. Performed reaching task forward and L to increase midline posture and weight shift in sitting. Performed cervical extension and rotation with cues to increase range. Pt returned to room and requested toilet transfer again due to incontinent of stool. Performed toilet transfer with stedy with dependent depends change. Pt remained in w/c with alarm on and all needs in reach.               G-Code     OutComes Score                                                     AM-PAC Score             Goals  Short term goals  Time Frame for Short term goals: 1-2 weeks  Short term goal 1: Perform all bed mobility with Adriana - not met  Short term goal 2: Perform all transfers mod A - not met  Short term goal 3: Propel w/c 25' with Adriana - met 4/14/2020  Long term goals  Time Frame for Long term goals : 2-3 weeks  Long term goal 1: Perform all bed mobility SBA - not met  Long term goal 2: Perform all transfers SBA - not met  Long term goal 3: Propel w/c 48' with supervision - not met  Patient Goals   Patient goals : \"to transfer on my own again\"    Plan    Plan  Times per week: 60 minutes a day 5 days a week  Current Treatment Recommendations: Functional Mobility Training, Neuromuscular Re-education, Balance Training, Strengthening, Transfer Training, Wheelchair Mobility Training, Manual Therapy - Soft Tissue Mobilization, Endurance Training, Safety Education & Training, Positioning, Equipment Evaluation, Education, & procurement, ROM, Patient/Caregiver Education & Training  Safety Devices  Type of devices: Call light within reach, Nurse notified, Gait belt, All fall risk precautions in place, Chair alarm in place, Left in chair  Restraints  Initially in place: No     Therapy

## 2020-04-23 LAB — SARS-COV-2, PCR: NOT DETECTED

## 2020-04-23 PROCEDURE — 1280000000 HC REHAB R&B

## 2020-04-23 PROCEDURE — 97530 THERAPEUTIC ACTIVITIES: CPT

## 2020-04-23 PROCEDURE — 97535 SELF CARE MNGMENT TRAINING: CPT

## 2020-04-23 PROCEDURE — 6370000000 HC RX 637 (ALT 250 FOR IP): Performed by: PHYSICAL MEDICINE & REHABILITATION

## 2020-04-23 RX ADMIN — LOSARTAN POTASSIUM 100 MG: 100 TABLET, FILM COATED ORAL at 09:52

## 2020-04-23 RX ADMIN — DILTIAZEM HYDROCHLORIDE 30 MG: 60 TABLET, FILM COATED ORAL at 03:00

## 2020-04-23 RX ADMIN — TRAZODONE HYDROCHLORIDE 50 MG: 50 TABLET ORAL at 20:44

## 2020-04-23 RX ADMIN — DILTIAZEM HYDROCHLORIDE 30 MG: 60 TABLET, FILM COATED ORAL at 09:52

## 2020-04-23 RX ADMIN — LANSOPRAZOLE 30 MG: 30 TABLET, ORALLY DISINTEGRATING ORAL at 06:14

## 2020-04-23 RX ADMIN — DILTIAZEM HYDROCHLORIDE 30 MG: 60 TABLET, FILM COATED ORAL at 20:44

## 2020-04-23 RX ADMIN — RIVAROXABAN 15 MG: 15 TABLET, FILM COATED ORAL at 18:39

## 2020-04-23 RX ADMIN — DILTIAZEM HYDROCHLORIDE 30 MG: 60 TABLET, FILM COATED ORAL at 14:34

## 2020-04-23 RX ADMIN — ASPIRIN 81 MG 81 MG: 81 TABLET ORAL at 09:52

## 2020-04-23 RX ADMIN — DESMOPRESSIN ACETATE 40 MG: 0.2 TABLET ORAL at 20:44

## 2020-04-23 RX ADMIN — SERTRALINE 50 MG: 50 TABLET, FILM COATED ORAL at 09:52

## 2020-04-23 ASSESSMENT — PAIN SCALES - GENERAL
PAINLEVEL_OUTOF10: 0

## 2020-04-23 NOTE — PROGRESS NOTES
at risk for falls  Restraints  Initially in place: No     Therapy Time   Individual Concurrent Group Co-treatment   Time In       0830   Time Out       0930   Minutes       60   Timed Code Treatment Minutes: Caro, PT   Keiry Ordaz, [address] [facility], DPT, [id number]

## 2020-04-23 NOTE — PROGRESS NOTES
Occupational Therapy  Facility/Department: [facility] ACUTE REHAB UNIT  Daily Treatment Note  NAME: Edmund Bowles  : 11/3/1933  MRN: [id number]    Date of Service: 2020    Discharge Recommendations:   Continue to assess pending progress  OT Equipment Recommendations  Equipment Needed: No  Other: Continue to assess pending pt discharge plan     Assessment   Performance deficits / Impairments: Decreased functional mobility ; Decreased strength;Decreased endurance;Decreased ADL status; Decreased safe awareness;Decreased cognition;Decreased balance;Decreased fine motor control;Decreased posture;Decreased coordination  Assessment: Pt has made very little progress during inpatient stay. Pt has minimal motivation to participate in therapy. Pt dependent for transfers and most ADLs. Plan to go to inpatient hospice vs SNF  Treatment Diagnosis: Multiple performance deficits associated w/ CVA  OT Education: OT Role;Plan of Care;ADL Adaptive Strategies;Transfer Training;Equipment  Patient Education: Pt verbalized understanding but will require continued reinforcement to promote carryover  Barriers to Learning: Cognition, motivation, agitation  REQUIRES OT FOLLOW UP: Yes  Activity Tolerance  Activity Tolerance: Patient limited by fatigue  Activity Tolerance: Pt continues to be limited d/t decreased motivation to participate in therapy   Safety Devices  Safety Devices in place: Yes  Type of devices: All fall risk precautions in place;Call light within reach; Patient at risk for falls;Gait belt;Nurse notified; Left in chair;Chair alarm in place         Patient Diagnosis(es): There were no encounter diagnoses. has a past medical history of Cerebral artery occlusion with cerebral infarction [facility]), Cerebral vascular disease, Hyperlipidemia, Hypertension, Peripheral vascular disease ([facility] [address] and Urinary incontinence. has a past surgical history that includes  section;  Hysterectomy; and Carotid endarterectomy (Right,

## 2020-04-23 NOTE — PLAN OF CARE
1259 by Kyle Bardales RN  Outcome: Ongoing  4/22/2020 1255 by Kyle Bardales RN  Outcome: Ongoing     Problem: Falls - Risk of:  Goal: Will remain free from falls  Description: Will remain free from falls  4/23/2020 0127 by Karon Quiles RN  Outcome: Ongoing  4/22/2020 1259 by Kyle Bardales RN  Outcome: Ongoing  4/22/2020 1255 by Kyle Bardales RN  Outcome: Ongoing  Goal: Absence of physical injury  Description: Absence of physical injury  4/23/2020 0127 by Karon Quiles RN  Outcome: Ongoing  4/22/2020 1259 by Kyle Bardales RN  Outcome: Ongoing  4/22/2020 1255 by Kyle Bardales RN  Outcome: Ongoing     Problem: Neurological  Goal: Maximum potential motor/sensory/cognitive function  4/23/2020 0127 by Karon Quiles RN  Outcome: Ongoing  4/22/2020 1259 by Kyle Bardales RN  Outcome: Ongoing  4/22/2020 1255 by Kyle Bardales RN  Outcome: Ongoing     Problem: Nutrition  Goal: Optimal nutrition therapy  4/23/2020 0127 by Karon Quiles RN  Outcome: Ongoing  4/22/2020 1259 by Kyle Bardales RN  Outcome: Ongoing  4/22/2020 1255 by Kyle Bardales RN  Outcome: Ongoing     Problem: Pain:  Goal: Pain level will decrease  Description: Pain level will decrease  4/23/2020 0127 by Karon Quiles RN  Outcome: Ongoing  4/22/2020 1259 by Kyle Bardales RN  Outcome: Ongoing  4/22/2020 1255 by Kyle Bardales RN  Outcome: Ongoing  Goal: Control of acute pain  Description: Control of acute pain  4/23/2020 0127 by Karon Quiles RN  Outcome: Ongoing  4/22/2020 1259 by Kyle Bardales RN  Outcome: Ongoing  4/22/2020 1255 by Kyle Bardales RN  Outcome: Ongoing  Goal: Control of chronic pain  Description: Control of chronic pain  4/23/2020 0127 by Karon Quiles RN  Outcome: Ongoing  4/22/2020 1259 by Kyle Bardales RN  Outcome: Ongoing  4/22/2020 1255 by Kyle Bardales RN  Outcome: Ongoing     Problem: Respiratory:  Goal: Ability to maintain a

## 2020-04-23 NOTE — PROGRESS NOTES
Ez Etienne  4/23/2020  [id number]      Chief Complaint: Acute ischemic stroke [facility])    Subjective:   No overnight events. No current complaints. Covid negative x2. ROS: No CP, SOB, dyspnea    Objective:  Patient Vitals for the past 24 hrs:   BP Temp Temp src Pulse Resp SpO2 Weight   04/23/20 0600 -- -- -- -- -- -- 142 lb 1.6 oz (64.5 kg)   04/22/20 2215 130/77 97.7 °F (36.5 °C) Oral 64 18 94 % --     Gen: No distress, pleasant. Resting in bed  HEENT: Normocephalic, atraumatic  CV: Regular rate and rhythm. No MRG   Resp: No respiratory distress. CTAB. Diminished BS at BLL  Abd: Soft, nontender nondistended  Ext: No edema  Neuro: Alert, oriented, increased interaction. Dysarthria. LUE contracted with 0/5 strength and unable to fully abduct shoulder, extend elbow, or extend fingers and wrist due to pain    Laboratory data: Available via EMR. Therapy progress:  PT  Position Activity Restriction  Other position/activity restrictions: 80 y.o. female patient presenting by EMS from home. Patient lives with her daughter and son-in-law. Patient in usual state of health on Tuesday. Yesterday morning, Wednesday she woke with altered mental status which has progressed. Phone call to PCP indicating concern for UTI. Requested UA. UA not obtained. Cipro initiated. I did speak with daughter Dajuan Amesr at [phone] who indicated she had similar presentation in October 1, 2017 at which time she was admitted with altered mental status secondary to UTI. She was admitted to ICU because of hypotension. At this time the patient is combative and confused. According to eulogio Graff Pacer does indicate that this is a clear deviation from her baseline.   Objective     Sit to Stand: Maximum Assistance(maxA of 1 with grab bar, maxA of 2 from EOB)  Stand to sit: 2 Person Assistance, Dependent/Total(maxA of 1 and Adriana of 1)  Bed to Chair: 2 Person Assistance, Dependent/Total(maxA of 2)     OT  PT Equipment Recommendations  Equipment

## 2020-04-24 VITALS
DIASTOLIC BLOOD PRESSURE: 69 MMHG | RESPIRATION RATE: 16 BRPM | HEIGHT: 61 IN | WEIGHT: 142.3 LBS | HEART RATE: 70 BPM | OXYGEN SATURATION: 95 % | SYSTOLIC BLOOD PRESSURE: 114 MMHG | TEMPERATURE: 97.8 F | BODY MASS INDEX: 26.87 KG/M2

## 2020-04-24 LAB
ANION GAP SERPL CALCULATED.3IONS-SCNC: 11 MMOL/L (ref 3–16)
BUN BLDV-MCNC: 8 MG/DL (ref 7–20)
CALCIUM SERPL-MCNC: 9.2 MG/DL (ref 8.3–10.6)
CHLORIDE BLD-SCNC: 95 MMOL/L (ref 99–110)
CO2: 24 MMOL/L (ref 21–32)
CREAT SERPL-MCNC: 0.7 MG/DL (ref 0.6–1.2)
GFR AFRICAN AMERICAN: >60
GFR NON-AFRICAN AMERICAN: >60
GLUCOSE BLD-MCNC: 99 MG/DL (ref 70–99)
HCT VFR BLD CALC: 37.3 % (ref 36–48)
HEMOGLOBIN: 12.6 G/DL (ref 12–16)
MAGNESIUM: 1.9 MG/DL (ref 1.8–2.4)
MCH RBC QN AUTO: 32.4 PG (ref 26–34)
MCHC RBC AUTO-ENTMCNC: 33.8 G/DL (ref 31–36)
MCV RBC AUTO: 95.6 FL (ref 80–100)
PDW BLD-RTO: 14.5 % (ref 12.4–15.4)
PLATELET # BLD: 292 K/UL (ref 135–450)
PMV BLD AUTO: 8.7 FL (ref 5–10.5)
POTASSIUM SERPL-SCNC: 4.3 MMOL/L (ref 3.5–5.1)
RBC # BLD: 3.9 M/UL (ref 4–5.2)
SODIUM BLD-SCNC: 130 MMOL/L (ref 136–145)
WBC # BLD: 7 K/UL (ref 4–11)

## 2020-04-24 PROCEDURE — 97535 SELF CARE MNGMENT TRAINING: CPT

## 2020-04-24 PROCEDURE — 83735 ASSAY OF MAGNESIUM: CPT

## 2020-04-24 PROCEDURE — 36415 COLL VENOUS BLD VENIPUNCTURE: CPT

## 2020-04-24 PROCEDURE — 85027 COMPLETE CBC AUTOMATED: CPT

## 2020-04-24 PROCEDURE — 80048 BASIC METABOLIC PNL TOTAL CA: CPT

## 2020-04-24 PROCEDURE — 6370000000 HC RX 637 (ALT 250 FOR IP): Performed by: PHYSICAL MEDICINE & REHABILITATION

## 2020-04-24 PROCEDURE — 97530 THERAPEUTIC ACTIVITIES: CPT

## 2020-04-24 RX ADMIN — ASPIRIN 81 MG 81 MG: 81 TABLET ORAL at 09:30

## 2020-04-24 RX ADMIN — LOSARTAN POTASSIUM 100 MG: 100 TABLET, FILM COATED ORAL at 09:30

## 2020-04-24 RX ADMIN — LANSOPRAZOLE 30 MG: 30 TABLET, ORALLY DISINTEGRATING ORAL at 06:17

## 2020-04-24 RX ADMIN — SERTRALINE 50 MG: 50 TABLET, FILM COATED ORAL at 09:31

## 2020-04-24 RX ADMIN — DILTIAZEM HYDROCHLORIDE 30 MG: 60 TABLET, FILM COATED ORAL at 09:31

## 2020-04-24 RX ADMIN — DILTIAZEM HYDROCHLORIDE 30 MG: 60 TABLET, FILM COATED ORAL at 03:09

## 2020-04-24 NOTE — PROGRESS NOTES
Dynamic: Fair;-  Standing - Static: Poor  Standing - Dynamic: Poor            Comment: Performed bed mobility and transfer to toilet as documetned above. Pt performed dressing on toilet with OT with close SBA for safety of balance due to LLE in extension. Performed SPT from toilet to w/c. Performed ADL sitting at sink with vc to lean forward to spit in sink when brushing teeth. Pt refused further activity. Pt remained in w/c and set up for breakfast with alarm on and all needs in reach.               G-Code     OutComes Score                                                     AM-PAC Score             Goals  Short term goals  Time Frame for Short term goals: 1-2 weeks  Short term goal 1: Perform all bed mobility with Adriana - not met  Short term goal 2: Perform all transfers mod A - not met  Short term goal 3: Propel w/c 25' with Adriana - met 4/14/2020  Long term goals  Time Frame for Long term goals : 2-3 weeks  Long term goal 1: Perform all bed mobility SBA - not met  Long term goal 2: Perform all transfers SBA - not met  Long term goal 3: Propel w/c 48' with supervision - not met  Patient Goals   Patient goals : \"to transfer on my own again\"    Plan    Plan  Times per week: 60 minutes a day 5 days a week  Current Treatment Recommendations: Functional Mobility Training, Neuromuscular Re-education, Balance Training, Strengthening, Transfer Training, Wheelchair Mobility Training, Manual Therapy - Soft Tissue Mobilization, Endurance Training, Safety Education & Training, Positioning, Equipment Evaluation, Education, & procurement, ROM, Patient/Caregiver Education & Training  Safety Devices  Type of devices: Call light within reach, Nurse notified, Gait belt, All fall risk precautions in place, Chair alarm in place, Left in chair, Patient at risk for falls  Restraints  Initially in place: No     Therapy Time   Individual Concurrent Group Co-treatment   Time In       0830   Time Out       0856   Minutes       26

## 2020-04-24 NOTE — PROGRESS NOTES
Occupational Therapy  Facility/Department: [facility] ACUTE REHAB UNIT  Daily Treatment Note & Discharge Summary  NAME: Tiara Romero  : 11/3/1933  MRN: [id number]    Date of Service: 2020    Discharge Recommendations:  Continue to assess pending progress  OT Equipment Recommendations  Equipment Needed: No  Other: Defer to discharge facility     Assessment   Performance deficits / Impairments: Decreased functional mobility ; Decreased strength;Decreased endurance;Decreased ADL status; Decreased safe awareness;Decreased cognition;Decreased balance;Decreased fine motor control;Decreased posture;Decreased coordination  Assessment: Pt has made very little progress during inpatient stay. Pt has minimal motivation to participate in therapy. Pt dependent for transfers and most ADLs. Plan to go to inpatient hospice vs SNF  Treatment Diagnosis: Multiple performance deficits associated w/ CVA  OT Education: OT Role;Plan of Care;ADL Adaptive Strategies;Transfer Training;Equipment  Patient Education: Pt verbalized understanding but will require continued reinforcement to promote carryover  Barriers to Learning: Cognition, motivation, agitation  REQUIRES OT FOLLOW UP: Yes  Activity Tolerance  Activity Tolerance: Patient limited by fatigue  Activity Tolerance: Pt continues to be limited d/t decreased motivation to participate in therapy   Safety Devices  Safety Devices in place: Yes  Type of devices: All fall risk precautions in place;Call light within reach; Patient at risk for falls;Gait belt;Nurse notified; Left in chair;Chair alarm in place         Patient Diagnosis(es): CVA     has a past medical history of Cerebral artery occlusion with cerebral infarction [facility]), Cerebral vascular disease, Hyperlipidemia, Hypertension, Peripheral vascular disease ([facility] [address] and Urinary incontinence. has a past surgical history that includes  section;  Hysterectomy; and Carotid endarterectomy (Right,

## 2020-04-24 NOTE — CARE COORDINATION
Ambulance transport arranged for 1300 today to Home at [facility]. SALOME completed and printed. Called dtr, Sarah Moore to inform.  Bernie Polo, MSW, LSW

## 2020-04-30 PROBLEM — N39.0 UTI (URINARY TRACT INFECTION): Status: RESOLVED | Noted: 2020-03-31 | Resolved: 2020-04-30

## 2025-05-10 NOTE — TELEPHONE ENCOUNTER
Can you print up lab results so I can compose a note to her
Letter sent to pt  Attempted to call pt 2x and phone  clicked and then  hung up
Pt would like a copy of the lab letter in writing and mailed Mailed to her house.  Please give pt a call when complete [phone]
Results on your desk
Home